# Patient Record
Sex: FEMALE | Race: WHITE | Employment: OTHER | URBAN - METROPOLITAN AREA
[De-identification: names, ages, dates, MRNs, and addresses within clinical notes are randomized per-mention and may not be internally consistent; named-entity substitution may affect disease eponyms.]

---

## 2017-02-23 PROBLEM — R80.9 PROTEINURIA: Status: ACTIVE | Noted: 2017-02-23

## 2017-02-23 PROBLEM — I73.9 PAD (PERIPHERAL ARTERY DISEASE) (HCC): Status: ACTIVE | Noted: 2017-02-23

## 2017-02-23 PROBLEM — E78.5 HYPERLIPIDEMIA: Status: ACTIVE | Noted: 2017-02-23

## 2017-02-23 PROBLEM — N32.81 OAB (OVERACTIVE BLADDER): Status: ACTIVE | Noted: 2017-02-23

## 2017-02-23 PROBLEM — I10 HTN (HYPERTENSION): Status: ACTIVE | Noted: 2017-02-23

## 2017-03-15 ENCOUNTER — HOSPITAL ENCOUNTER (OUTPATIENT)
Dept: GENERAL RADIOLOGY | Age: 82
Discharge: HOME | End: 2017-03-15
Attending: INTERNAL MEDICINE | Admitting: INTERNAL MEDICINE

## 2017-03-15 LAB
ALBUMIN SERPL-MCNC: 4.8 GM/DL (ref 3.4–5)
ANION GAP SERPL CALCULATED.3IONS-SCNC: 17 MMOL/L (ref 4–16)
BACTERIA: NEGATIVE /HPF
BILIRUBIN URINE: NEGATIVE MG/DL
BLOOD, URINE: NEGATIVE
BUN BLDV-MCNC: 70 MG/DL (ref 6–23)
CALCIUM SERPL-MCNC: 9.5 MG/DL (ref 8.3–10.6)
CHLORIDE BLD-SCNC: 92 MMOL/L (ref 99–110)
CLARITY: CLEAR
CO2: 23 MMOL/L (ref 21–32)
COLOR: COLORLESS
CREAT SERPL-MCNC: 1.7 MG/DL (ref 0.6–1.1)
GFR AFRICAN AMERICAN: 35 ML/MIN/1.73M2
GFR NON-AFRICAN AMERICAN: 29 ML/MIN/1.73M2
GLUCOSE BLD-MCNC: 98 MG/DL (ref 70–140)
GLUCOSE, URINE: NEGATIVE MG/DL
HYALINE CASTS: 2 /LPF
KETONES, URINE: NEGATIVE MG/DL
LEUKOCYTE ESTERASE, URINE: NEGATIVE
MAGNESIUM: 2 MG/DL (ref 1.8–2.4)
MUCUS: ABNORMAL HPF
NITRITE URINE, QUANTITATIVE: NEGATIVE
PH, URINE: 5 (ref 5–8)
POTASSIUM SERPL-SCNC: 4.2 MMOL/L (ref 3.5–5.1)
PROTEIN UA: NEGATIVE MG/DL
RBC URINE: ABNORMAL /HPF (ref 0–6)
SODIUM BLD-SCNC: 132 MMOL/L (ref 135–145)
SPECIFIC GRAVITY UA: 1 (ref 1–1.03)
SQUAMOUS EPITHELIAL: <1 /HPF
UROBILINOGEN, URINE: NORMAL EU/DL (ref 0.2–1)
WBC UA: <1 /HPF (ref 0–5)

## 2017-03-17 LAB
ALBUMIN ELP: 3.8 GM/DL (ref 3.2–5.6)
ALBUMIN, U: 100 %
ALPHA-1-GLOBULIN, U: 0 %
ALPHA-1-GLOBULIN: 0.3 GM/DL (ref 0.1–0.4)
ALPHA-2-GLOBULIN, U: 0 %
ALPHA-2-GLOBULIN: 1.1 GM/DL (ref 0.4–1.2)
BETA GLOBULIN, U: 0 %
BETA GLOBULIN: 1.3 GM/DL (ref 0.5–1.3)
COMPLEMENT C3: 156 MG/DL
COMPLEMENT C4: 46 MG/DL
GAMMA GLOBULIN, U: 0 %
GAMMA GLOBULIN: 0.8 GM/DL (ref 0.5–1.6)
INTERPRETATION: NORMAL
KAPPA QUANT FREE LIGHT CHAINS: 2.97
KAPPA/LAMBDA FREE LIGHT CHAIN RATIO: 1.81
LAMBDA FREE LIGHT CHAINS URINE/ VOL: 1.64
NEUTROPHIL CYTOPLASMIC AB IGG: NORMAL
TOTAL PROTEIN: 7.2 GM/DL (ref 6.4–8.2)
URINE TOTAL PROTEIN: 4.6 MG/DL

## 2017-03-19 ENCOUNTER — HOSPITAL ENCOUNTER (OUTPATIENT)
Dept: OTHER | Age: 82
Discharge: HOME | End: 2017-03-19
Attending: INTERNAL MEDICINE | Admitting: INTERNAL MEDICINE

## 2017-03-20 ENCOUNTER — HOSPITAL ENCOUNTER (OUTPATIENT)
Dept: GENERAL RADIOLOGY | Age: 82
Discharge: HOME | End: 2017-03-20
Attending: INTERNAL MEDICINE | Admitting: INTERNAL MEDICINE

## 2017-03-20 LAB
ALBUMIN SERPL-MCNC: 4.5 GM/DL (ref 3.4–5)
ANION GAP SERPL CALCULATED.3IONS-SCNC: 15 MMOL/L (ref 4–16)
BACTERIA: ABNORMAL /HPF
BILIRUBIN URINE: NEGATIVE MG/DL
BLOOD, URINE: NEGATIVE
BUN BLDV-MCNC: 23 MG/DL (ref 6–23)
CALCIUM SERPL-MCNC: 10.1 MG/DL (ref 8.3–10.6)
CHLORIDE BLD-SCNC: 103 MMOL/L (ref 99–110)
CLARITY: ABNORMAL
CO2: 23 MMOL/L (ref 21–32)
COLOR: ABNORMAL
CREAT SERPL-MCNC: 1 MG/DL (ref 0.6–1.1)
GFR AFRICAN AMERICAN: >60 ML/MIN/1.73M2
GFR NON-AFRICAN AMERICAN: 53 ML/MIN/1.73M2
GLUCOSE BLD-MCNC: 91 MG/DL (ref 70–140)
GLUCOSE, URINE: NEGATIVE MG/DL
HCT VFR BLD CALC: 33.9 % (ref 37–47)
HEMOGLOBIN: 11.3 GM/DL (ref 12.5–16)
KETONES, URINE: ABNORMAL MG/DL
LEUKOCYTE ESTERASE, URINE: NEGATIVE
Lab: 24 HRS
MCH RBC QN AUTO: 31.6 PG (ref 27–31)
MCHC RBC AUTO-ENTMCNC: 33.3 % (ref 32–36)
MCV RBC AUTO: 94.7 FL (ref 78–100)
MUCUS: ABNORMAL HPF
NITRITE URINE, QUANTITATIVE: NEGATIVE
PDW BLD-RTO: 13.4 % (ref 11.7–14.9)
PH, URINE: 5 (ref 5–8)
PHOSPHORUS: 3 MG/DL (ref 2.5–4.9)
PLATELET # BLD: 330 K/CU MM (ref 140–440)
PMV BLD AUTO: 10.7 FL (ref 7.5–11.1)
POTASSIUM SERPL-SCNC: 4.2 MMOL/L (ref 3.5–5.1)
PROTEIN 24 HOUR URINE: 942 MG/24 HR
PROTEIN UA: 30 MG/DL
RBC # BLD: 3.58 M/CU MM (ref 4.2–5.4)
RBC URINE: <1 /HPF (ref 0–6)
SODIUM BLD-SCNC: 141 MMOL/L (ref 135–145)
SPECIFIC GRAVITY UA: 1.01 (ref 1–1.03)
SQUAMOUS EPITHELIAL: 2 /HPF
UROBILINOGEN, URINE: NORMAL EU/DL (ref 0.2–1)
VOLUME, (UVOL): 1750 MLS
WBC # BLD: 8.8 K/CU MM (ref 4–10.5)
WBC UA: <1 /HPF (ref 0–5)

## 2017-03-22 LAB
Lab: NORMAL
TEST NAME: NORMAL

## 2017-04-17 PROBLEM — N17.9 ACUTE RENAL FAILURE (HCC): Status: ACTIVE | Noted: 2017-04-17

## 2017-04-17 PROBLEM — N18.2 CHRONIC KIDNEY DISEASE (CKD) STAGE G2/A3, MILDLY DECREASED GLOMERULAR FILTRATION RATE (GFR) BETWEEN 60-89 ML/MIN/1.73 SQUARE METER AND ALBUMINURIA CREATININE RATIO GREATER THAN 300 MG/G: Status: ACTIVE | Noted: 2017-04-17

## 2017-04-24 ENCOUNTER — HOSPITAL ENCOUNTER (OUTPATIENT)
Dept: GENERAL RADIOLOGY | Age: 82
Discharge: HOME | End: 2017-04-24
Attending: INTERNAL MEDICINE | Admitting: INTERNAL MEDICINE

## 2017-04-24 LAB
BACTERIA: ABNORMAL /HPF
BILIRUBIN URINE: NEGATIVE MG/DL
BLOOD, URINE: NEGATIVE
CLARITY: CLEAR
COLOR: COLORLESS
CREATININE URINE: 23.9 MG/DL (ref 28–217)
GLUCOSE, URINE: NEGATIVE MG/DL
KETONES, URINE: NEGATIVE MG/DL
LEUKOCYTE ESTERASE, URINE: NEGATIVE
NITRITE URINE, QUANTITATIVE: NEGATIVE
PH, URINE: 7 (ref 5–8)
PROT/CREAT RATIO, UR: 0.3
PROTEIN UA: NEGATIVE MG/DL
RBC URINE: ABNORMAL /HPF (ref 0–6)
SPECIFIC GRAVITY UA: 1 (ref 1–1.03)
SQUAMOUS EPITHELIAL: <1 /HPF
URINE TOTAL PROTEIN: 7.7 MG/DL
UROBILINOGEN, URINE: NORMAL EU/DL (ref 0.2–1)
WBC UA: 1 /HPF (ref 0–5)

## 2017-06-06 ENCOUNTER — HOSPITAL ENCOUNTER (OUTPATIENT)
Dept: ULTRASOUND IMAGING | Age: 82
Discharge: HOME | End: 2017-06-06
Attending: FAMILY MEDICINE | Admitting: FAMILY MEDICINE

## 2017-06-06 DIAGNOSIS — N63.20 LEFT BREAST LUMP: ICD-10-CM

## 2017-06-06 DIAGNOSIS — N63.0 BREAST LUMP: ICD-10-CM

## 2017-07-18 ENCOUNTER — HOSPITAL ENCOUNTER (OUTPATIENT)
Dept: OTHER | Age: 82
Discharge: HOME | End: 2017-07-18
Attending: INTERNAL MEDICINE | Admitting: INTERNAL MEDICINE

## 2017-07-19 ENCOUNTER — HOSPITAL ENCOUNTER (OUTPATIENT)
Dept: GENERAL RADIOLOGY | Age: 82
Discharge: HOME | End: 2017-07-19
Attending: INTERNAL MEDICINE | Admitting: INTERNAL MEDICINE

## 2017-07-19 LAB
ALBUMIN SERPL-MCNC: 4.3 GM/DL (ref 3.4–5)
ANION GAP SERPL CALCULATED.3IONS-SCNC: 15 MMOL/L (ref 4–16)
BUN BLDV-MCNC: 20 MG/DL (ref 6–23)
CALCIUM SERPL-MCNC: 9.7 MG/DL (ref 8.3–10.6)
CHLORIDE BLD-SCNC: 99 MMOL/L (ref 99–110)
CO2: 26 MMOL/L (ref 21–32)
CREAT SERPL-MCNC: 1 MG/DL (ref 0.6–1.1)
GFR AFRICAN AMERICAN: >60 ML/MIN/1.73M2
GFR NON-AFRICAN AMERICAN: 53 ML/MIN/1.73M2
GLUCOSE BLD-MCNC: 91 MG/DL (ref 70–140)
Lab: 24 HRS
MAGNESIUM: 2 MG/DL (ref 1.8–2.4)
POTASSIUM SERPL-SCNC: 4.5 MMOL/L (ref 3.5–5.1)
PROTEIN 24 HOUR URINE: 364 MG/24 HR
SODIUM BLD-SCNC: 140 MMOL/L (ref 135–145)
VOLUME, (UVOL): 2600 MLS

## 2017-12-27 ENCOUNTER — HOSPITAL ENCOUNTER (OUTPATIENT)
Dept: ULTRASOUND IMAGING | Age: 82
Discharge: HOME | End: 2017-12-27
Attending: FAMILY MEDICINE | Admitting: FAMILY MEDICINE

## 2017-12-27 DIAGNOSIS — N60.02 CYST OF LEFT BREAST: ICD-10-CM

## 2018-02-05 ENCOUNTER — HOSPITAL ENCOUNTER (OUTPATIENT)
Dept: GENERAL RADIOLOGY | Age: 83
Discharge: HOME | End: 2018-02-05
Attending: INTERNAL MEDICINE | Admitting: INTERNAL MEDICINE

## 2018-02-05 LAB
ANION GAP SERPL CALCULATED.3IONS-SCNC: 17 MMOL/L (ref 4–16)
BACTERIA: ABNORMAL /HPF
BILIRUBIN URINE: NEGATIVE MG/DL
BLOOD, URINE: NEGATIVE
BUN BLDV-MCNC: 15 MG/DL (ref 6–23)
CALCIUM SERPL-MCNC: 9.7 MG/DL (ref 8.3–10.6)
CHLORIDE BLD-SCNC: 99 MMOL/L (ref 99–110)
CLARITY: ABNORMAL
CO2: 28 MMOL/L (ref 21–32)
COLOR: ABNORMAL
CREAT SERPL-MCNC: 0.9 MG/DL (ref 0.6–1.1)
CREATININE URINE: 27.9 MG/DL (ref 28–217)
GFR AFRICAN AMERICAN: >60 ML/MIN/1.73M2
GFR NON-AFRICAN AMERICAN: >60 ML/MIN/1.73M2
GLUCOSE BLD-MCNC: 98 MG/DL (ref 70–99)
GLUCOSE, URINE: NEGATIVE MG/DL
KETONES, URINE: NEGATIVE MG/DL
LEUKOCYTE ESTERASE, URINE: NEGATIVE
NITRITE URINE, QUANTITATIVE: NEGATIVE
PH, URINE: 6 (ref 5–8)
POTASSIUM SERPL-SCNC: 3.8 MMOL/L (ref 3.5–5.1)
PROT/CREAT RATIO, UR: 2.4
PROTEIN UA: 100 MG/DL
RBC URINE: 1 /HPF (ref 0–6)
SODIUM BLD-SCNC: 144 MMOL/L (ref 135–145)
SPECIFIC GRAVITY UA: 1 (ref 1–1.03)
SQUAMOUS EPITHELIAL: 1 /HPF
TRANSITIONAL EPITHELIAL: <1 /HPF
TRICHOMONAS: ABNORMAL /HPF
URINE TOTAL PROTEIN: 66.4 MG/DL
UROBILINOGEN, URINE: NORMAL MG/DL (ref 0.2–1)
WBC UA: ABNORMAL /HPF (ref 0–5)

## 2018-03-01 PROBLEM — N18.1 CHRONIC KIDNEY DISEASE (CKD) STAGE G1/A3, GLOMERULAR FILTRATION RATE (GFR) EQUAL TO OR GREATER THAN 90 ML/MIN/1.73 SQUARE METER AND ALBUMINURIA CREATININE RATIO GREATER THAN 300 MG/G: Status: ACTIVE | Noted: 2018-03-01

## 2018-05-08 ENCOUNTER — HOSPITAL ENCOUNTER (OUTPATIENT)
Dept: GENERAL RADIOLOGY | Age: 83
Discharge: HOME | End: 2018-05-08
Attending: FAMILY MEDICINE | Admitting: FAMILY MEDICINE

## 2018-05-08 LAB
ALBUMIN SERPL-MCNC: 4.3 GM/DL (ref 3.4–5)
ALP BLD-CCNC: 73 IU/L (ref 40–129)
ALT SERPL-CCNC: 7 U/L (ref 10–40)
AST SERPL-CCNC: 16 IU/L (ref 15–37)
BASOPHILS ABSOLUTE: 0.1 K/CU MM
BASOPHILS RELATIVE PERCENT: 0.7 % (ref 0–1)
BILIRUB SERPL-MCNC: 0.3 MG/DL (ref 0–1)
BILIRUBIN DIRECT: 0.2 MG/DL (ref 0–0.3)
BILIRUBIN, INDIRECT: 0.1 MG/DL (ref 0–0.7)
CHOLESTEROL: 232 MG/DL
DIFFERENTIAL TYPE: ABNORMAL
EOSINOPHILS ABSOLUTE: 0.4 K/CU MM
EOSINOPHILS RELATIVE PERCENT: 4.2 % (ref 0–3)
HCT VFR BLD CALC: 35.9 % (ref 37–47)
HDLC SERPL-MCNC: 62 MG/DL
HEMOGLOBIN: 11.8 GM/DL (ref 12.5–16)
IMMATURE NEUTROPHIL %: 0.3 % (ref 0–0.43)
IRON: 82 UG/DL (ref 37–145)
LDL CHOLESTEROL DIRECT: 170 MG/DL
LYMPHOCYTES ABSOLUTE: 2.2 K/CU MM
LYMPHOCYTES RELATIVE PERCENT: 23.1 % (ref 24–44)
MCH RBC QN AUTO: 31.8 PG (ref 27–31)
MCHC RBC AUTO-ENTMCNC: 32.9 % (ref 32–36)
MCV RBC AUTO: 96.8 FL (ref 78–100)
MONOCYTES ABSOLUTE: 0.8 K/CU MM
MONOCYTES RELATIVE PERCENT: 8.2 % (ref 0–4)
NUCLEATED RBC %: 0 %
PDW BLD-RTO: 13.2 % (ref 11.7–14.9)
PLATELET # BLD: 370 K/CU MM (ref 140–440)
PMV BLD AUTO: 10.7 FL (ref 7.5–11.1)
RBC # BLD: 3.71 M/CU MM (ref 4.2–5.4)
SEGMENTED NEUTROPHILS ABSOLUTE COUNT: 6.1 K/CU MM
SEGMENTED NEUTROPHILS RELATIVE PERCENT: 63.5 % (ref 36–66)
T4 FREE: 1.24 NG/DL (ref 0.9–1.8)
TOTAL IMMATURE NEUTOROPHIL: 0.03 K/CU MM
TOTAL NUCLEATED RBC: 0 K/CU MM
TOTAL PROTEIN: 6.7 GM/DL (ref 6.4–8.2)
TRIGL SERPL-MCNC: 160 MG/DL
TSH HIGH SENSITIVITY: 6.31 UIU/ML (ref 0.27–4.2)
VITAMIN B-12: 485.7 PG/ML (ref 211–911)
WBC # BLD: 9.5 K/CU MM (ref 4–10.5)

## 2018-06-22 ENCOUNTER — HOSPITAL ENCOUNTER (OUTPATIENT)
Dept: WOMENS IMAGING | Age: 83
Discharge: HOME | End: 2018-06-22
Attending: FAMILY MEDICINE | Admitting: FAMILY MEDICINE

## 2018-06-22 DIAGNOSIS — Z12.31 ENCOUNTER FOR SCREENING MAMMOGRAM FOR BREAST CANCER: ICD-10-CM

## 2018-06-28 ENCOUNTER — HOSPITAL ENCOUNTER (OUTPATIENT)
Dept: WOMENS IMAGING | Age: 83
Discharge: HOME | End: 2018-06-28
Attending: FAMILY MEDICINE | Admitting: FAMILY MEDICINE

## 2018-06-28 DIAGNOSIS — N64.89 BREAST ASYMMETRY: ICD-10-CM

## 2018-10-24 ENCOUNTER — HOSPITAL ENCOUNTER (OUTPATIENT)
Age: 83
Discharge: HOME | End: 2018-10-24
Payer: MEDICARE

## 2018-10-24 LAB
BASOPHILS ABSOLUTE: 0.1 K/CU MM
BASOPHILS RELATIVE PERCENT: 1.1 % (ref 0–1)
DIFFERENTIAL TYPE: ABNORMAL
EOSINOPHILS ABSOLUTE: 0.4 K/CU MM
EOSINOPHILS RELATIVE PERCENT: 5.3 % (ref 0–3)
HCT VFR BLD CALC: 36.3 % (ref 37–47)
HEMOGLOBIN: 11.5 GM/DL (ref 12.5–16)
IMMATURE NEUTROPHIL %: 0.4 % (ref 0–0.43)
LYMPHOCYTES ABSOLUTE: 2.6 K/CU MM
LYMPHOCYTES RELATIVE PERCENT: 32.5 % (ref 24–44)
MCH RBC QN AUTO: 30.8 PG (ref 27–31)
MCHC RBC AUTO-ENTMCNC: 31.7 % (ref 32–36)
MCV RBC AUTO: 97.3 FL (ref 78–100)
MONOCYTES ABSOLUTE: 0.8 K/CU MM
MONOCYTES RELATIVE PERCENT: 9.8 % (ref 0–4)
NUCLEATED RBC %: 0 %
PDW BLD-RTO: 13.5 % (ref 11.7–14.9)
PLATELET # BLD: 405 K/CU MM (ref 140–440)
PMV BLD AUTO: 10.7 FL (ref 7.5–11.1)
RBC # BLD: 3.73 M/CU MM (ref 4.2–5.4)
SEGMENTED NEUTROPHILS ABSOLUTE COUNT: 4.1 K/CU MM
SEGMENTED NEUTROPHILS RELATIVE PERCENT: 50.9 % (ref 36–66)
T3 FREE: 2.4 PG/ML (ref 2.3–4.2)
T4 FREE: 1.18 NG/DL (ref 0.9–1.8)
TOTAL IMMATURE NEUTOROPHIL: 0.03 K/CU MM
TOTAL NUCLEATED RBC: 0 K/CU MM
TSH HIGH SENSITIVITY: 4.63 UIU/ML (ref 0.27–4.2)
WBC # BLD: 8.1 K/CU MM (ref 4–10.5)

## 2018-10-24 PROCEDURE — 85025 COMPLETE CBC W/AUTO DIFF WBC: CPT

## 2018-10-24 PROCEDURE — 84443 ASSAY THYROID STIM HORMONE: CPT

## 2018-10-24 PROCEDURE — 36415 COLL VENOUS BLD VENIPUNCTURE: CPT

## 2018-10-24 PROCEDURE — 84481 FREE ASSAY (FT-3): CPT

## 2018-10-24 PROCEDURE — 84439 ASSAY OF FREE THYROXINE: CPT

## 2018-11-02 RX ORDER — SODIUM CHLORIDE 0.9 % (FLUSH) 0.9 %
10 SYRINGE (ML) INJECTION 2 TIMES DAILY
Status: CANCELLED | OUTPATIENT
Start: 2018-11-02

## 2018-11-04 ENCOUNTER — APPOINTMENT (OUTPATIENT)
Dept: GENERAL RADIOLOGY | Age: 83
DRG: 291 | End: 2018-11-04
Payer: MEDICARE

## 2018-11-04 ENCOUNTER — APPOINTMENT (OUTPATIENT)
Dept: CT IMAGING | Age: 83
DRG: 291 | End: 2018-11-04
Payer: MEDICARE

## 2018-11-04 ENCOUNTER — HOSPITAL ENCOUNTER (INPATIENT)
Age: 83
LOS: 3 days | Discharge: HOME | DRG: 291 | End: 2018-11-08
Attending: EMERGENCY MEDICINE | Admitting: INTERNAL MEDICINE
Payer: MEDICARE

## 2018-11-04 DIAGNOSIS — N18.1 CHRONIC KIDNEY DISEASE (CKD) STAGE G1/A3, GLOMERULAR FILTRATION RATE (GFR) EQUAL TO OR GREATER THAN 90 ML/MIN/1.73 SQUARE METER AND ALBUMINURIA CREATININE RATIO GREATER THAN 300 MG/G: ICD-10-CM

## 2018-11-04 DIAGNOSIS — J96.01 ACUTE RESPIRATORY FAILURE WITH HYPOXIA (HCC): ICD-10-CM

## 2018-11-04 DIAGNOSIS — R68.89 GENERAL ILL FEELING: ICD-10-CM

## 2018-11-04 DIAGNOSIS — D72.829 LEUKOCYTOSIS, UNSPECIFIED TYPE: ICD-10-CM

## 2018-11-04 DIAGNOSIS — R51.9 NONINTRACTABLE HEADACHE, UNSPECIFIED CHRONICITY PATTERN, UNSPECIFIED HEADACHE TYPE: ICD-10-CM

## 2018-11-04 DIAGNOSIS — R11.0 NAUSEA: Primary | ICD-10-CM

## 2018-11-04 DIAGNOSIS — R79.89 ELEVATED BRAIN NATRIURETIC PEPTIDE (BNP) LEVEL: ICD-10-CM

## 2018-11-04 DIAGNOSIS — R42 DIZZINESS: ICD-10-CM

## 2018-11-04 LAB
ALBUMIN SERPL-MCNC: 4.2 GM/DL (ref 3.4–5)
ALP BLD-CCNC: 82 IU/L (ref 40–128)
ALT SERPL-CCNC: 9 U/L (ref 10–40)
ANION GAP SERPL CALCULATED.3IONS-SCNC: 14 MMOL/L (ref 4–16)
APTT: 33.1 SECONDS (ref 21.2–33)
AST SERPL-CCNC: 17 IU/L (ref 15–37)
BASOPHILS ABSOLUTE: 0.1 K/CU MM
BASOPHILS RELATIVE PERCENT: 0.5 % (ref 0–1)
BILIRUB SERPL-MCNC: 0.3 MG/DL (ref 0–1)
BUN BLDV-MCNC: 23 MG/DL (ref 6–23)
CALCIUM SERPL-MCNC: 9 MG/DL (ref 8.3–10.6)
CHLORIDE BLD-SCNC: 98 MMOL/L (ref 99–110)
CO2: 24 MMOL/L (ref 21–32)
CREAT SERPL-MCNC: 1.2 MG/DL (ref 0.6–1.1)
DIFFERENTIAL TYPE: ABNORMAL
EOSINOPHILS ABSOLUTE: 0.2 K/CU MM
EOSINOPHILS RELATIVE PERCENT: 1.6 % (ref 0–3)
GFR AFRICAN AMERICAN: 52 ML/MIN/1.73M2
GFR NON-AFRICAN AMERICAN: 43 ML/MIN/1.73M2
GLUCOSE BLD-MCNC: 150 MG/DL (ref 70–99)
HCT VFR BLD CALC: 35.2 % (ref 37–47)
HEMOGLOBIN: 11.2 GM/DL (ref 12.5–16)
IMMATURE NEUTROPHIL %: 0.5 % (ref 0–0.43)
INR BLD: 1.11 INDEX
LIPASE: 17 IU/L (ref 13–60)
LYMPHOCYTES ABSOLUTE: 1.1 K/CU MM
LYMPHOCYTES RELATIVE PERCENT: 8.8 % (ref 24–44)
MCH RBC QN AUTO: 30.4 PG (ref 27–31)
MCHC RBC AUTO-ENTMCNC: 31.8 % (ref 32–36)
MCV RBC AUTO: 95.7 FL (ref 78–100)
MONOCYTES ABSOLUTE: 0.8 K/CU MM
MONOCYTES RELATIVE PERCENT: 5.9 % (ref 0–4)
NUCLEATED RBC %: 0 %
PDW BLD-RTO: 13.4 % (ref 11.7–14.9)
PLATELET # BLD: 318 K/CU MM (ref 140–440)
PMV BLD AUTO: 10.1 FL (ref 7.5–11.1)
POTASSIUM SERPL-SCNC: 3.7 MMOL/L (ref 3.5–5.1)
PRO-BNP: 5765 PG/ML
PROTHROMBIN TIME: 12.6 SECONDS (ref 9.12–12.5)
RBC # BLD: 3.68 M/CU MM (ref 4.2–5.4)
SEGMENTED NEUTROPHILS ABSOLUTE COUNT: 10.7 K/CU MM
SEGMENTED NEUTROPHILS RELATIVE PERCENT: 82.7 % (ref 36–66)
SODIUM BLD-SCNC: 136 MMOL/L (ref 135–145)
TOTAL CK: 44 IU/L (ref 26–140)
TOTAL IMMATURE NEUTOROPHIL: 0.06 K/CU MM
TOTAL NUCLEATED RBC: 0 K/CU MM
TOTAL PROTEIN: 7.1 GM/DL (ref 6.4–8.2)
TROPONIN T: <0.01 NG/ML
WBC # BLD: 12.9 K/CU MM (ref 4–10.5)

## 2018-11-04 PROCEDURE — S0028 INJECTION, FAMOTIDINE, 20 MG: HCPCS | Performed by: EMERGENCY MEDICINE

## 2018-11-04 PROCEDURE — 94761 N-INVAS EAR/PLS OXIMETRY MLT: CPT

## 2018-11-04 PROCEDURE — 2580000003 HC RX 258: Performed by: EMERGENCY MEDICINE

## 2018-11-04 PROCEDURE — 85610 PROTHROMBIN TIME: CPT

## 2018-11-04 PROCEDURE — 71045 X-RAY EXAM CHEST 1 VIEW: CPT

## 2018-11-04 PROCEDURE — 2500000003 HC RX 250 WO HCPCS: Performed by: EMERGENCY MEDICINE

## 2018-11-04 PROCEDURE — 85025 COMPLETE CBC W/AUTO DIFF WBC: CPT

## 2018-11-04 PROCEDURE — 93005 ELECTROCARDIOGRAM TRACING: CPT | Performed by: EMERGENCY MEDICINE

## 2018-11-04 PROCEDURE — 96374 THER/PROPH/DIAG INJ IV PUSH: CPT

## 2018-11-04 PROCEDURE — 6360000004 HC RX CONTRAST MEDICATION: Performed by: EMERGENCY MEDICINE

## 2018-11-04 PROCEDURE — 96361 HYDRATE IV INFUSION ADD-ON: CPT

## 2018-11-04 PROCEDURE — 70496 CT ANGIOGRAPHY HEAD: CPT

## 2018-11-04 PROCEDURE — 99285 EMERGENCY DEPT VISIT HI MDM: CPT

## 2018-11-04 PROCEDURE — 6370000000 HC RX 637 (ALT 250 FOR IP): Performed by: EMERGENCY MEDICINE

## 2018-11-04 PROCEDURE — 80053 COMPREHEN METABOLIC PANEL: CPT

## 2018-11-04 PROCEDURE — 84484 ASSAY OF TROPONIN QUANT: CPT

## 2018-11-04 PROCEDURE — 83880 ASSAY OF NATRIURETIC PEPTIDE: CPT

## 2018-11-04 PROCEDURE — 85730 THROMBOPLASTIN TIME PARTIAL: CPT

## 2018-11-04 PROCEDURE — 82550 ASSAY OF CK (CPK): CPT

## 2018-11-04 PROCEDURE — 83690 ASSAY OF LIPASE: CPT

## 2018-11-04 PROCEDURE — 2700000000 HC OXYGEN THERAPY PER DAY

## 2018-11-04 PROCEDURE — 6360000002 HC RX W HCPCS: Performed by: EMERGENCY MEDICINE

## 2018-11-04 PROCEDURE — 70498 CT ANGIOGRAPHY NECK: CPT

## 2018-11-04 RX ORDER — ACETAMINOPHEN 500 MG
1000 TABLET ORAL ONCE
Status: COMPLETED | OUTPATIENT
Start: 2018-11-04 | End: 2018-11-04

## 2018-11-04 RX ORDER — SODIUM CHLORIDE 0.9 % (FLUSH) 0.9 %
10 SYRINGE (ML) INJECTION 2 TIMES DAILY
Status: DISCONTINUED | OUTPATIENT
Start: 2018-11-04 | End: 2018-11-08 | Stop reason: HOSPADM

## 2018-11-04 RX ORDER — METOCLOPRAMIDE HYDROCHLORIDE 5 MG/ML
10 INJECTION INTRAMUSCULAR; INTRAVENOUS ONCE
Status: COMPLETED | OUTPATIENT
Start: 2018-11-04 | End: 2018-11-04

## 2018-11-04 RX ORDER — DIPHENHYDRAMINE HCL 25 MG
25 TABLET ORAL ONCE
Status: COMPLETED | OUTPATIENT
Start: 2018-11-04 | End: 2018-11-04

## 2018-11-04 RX ORDER — 0.9 % SODIUM CHLORIDE 0.9 %
1000 INTRAVENOUS SOLUTION INTRAVENOUS ONCE
Status: COMPLETED | OUTPATIENT
Start: 2018-11-04 | End: 2018-11-05

## 2018-11-04 RX ADMIN — DIPHENHYDRAMINE HCL 25 MG: 25 TABLET ORAL at 22:19

## 2018-11-04 RX ADMIN — FAMOTIDINE 20 MG: 10 INJECTION, SOLUTION INTRAVENOUS at 22:20

## 2018-11-04 RX ADMIN — METOCLOPRAMIDE 10 MG: 5 INJECTION, SOLUTION INTRAMUSCULAR; INTRAVENOUS at 22:19

## 2018-11-04 RX ADMIN — SODIUM CHLORIDE, PRESERVATIVE FREE 10 ML: 5 INJECTION INTRAVENOUS at 23:34

## 2018-11-04 RX ADMIN — SODIUM CHLORIDE 1000 ML: 9 INJECTION, SOLUTION INTRAVENOUS at 22:19

## 2018-11-04 RX ADMIN — ACETAMINOPHEN 1000 MG: 500 TABLET, FILM COATED ORAL at 22:19

## 2018-11-04 RX ADMIN — IOPAMIDOL 80 ML: 755 INJECTION, SOLUTION INTRAVENOUS at 23:34

## 2018-11-04 ASSESSMENT — ENCOUNTER SYMPTOMS
PHOTOPHOBIA: 1
ALLERGIC/IMMUNOLOGIC NEGATIVE: 1
VOMITING: 1
NAUSEA: 1
RESPIRATORY NEGATIVE: 1
DIARRHEA: 1

## 2018-11-04 ASSESSMENT — PAIN DESCRIPTION - PAIN TYPE: TYPE: ACUTE PAIN

## 2018-11-04 ASSESSMENT — PAIN SCALES - GENERAL
PAINLEVEL_OUTOF10: 5
PAINLEVEL_OUTOF10: 10

## 2018-11-04 ASSESSMENT — PAIN DESCRIPTION - LOCATION: LOCATION: HEAD

## 2018-11-05 ENCOUNTER — HOSPITAL ENCOUNTER (OUTPATIENT)
Dept: CT IMAGING | Age: 83
Discharge: HOME | End: 2018-11-05
Payer: MEDICARE

## 2018-11-05 ENCOUNTER — APPOINTMENT (OUTPATIENT)
Dept: GENERAL RADIOLOGY | Age: 83
DRG: 291 | End: 2018-11-05
Payer: MEDICARE

## 2018-11-05 ENCOUNTER — APPOINTMENT (OUTPATIENT)
Dept: ULTRASOUND IMAGING | Age: 83
DRG: 291 | End: 2018-11-05
Payer: MEDICARE

## 2018-11-05 PROBLEM — J81.0 FLASH PULMONARY EDEMA (HCC): Status: ACTIVE | Noted: 2018-11-05

## 2018-11-05 LAB
MAGNESIUM: 1.9 MG/DL (ref 1.8–2.4)
T4 FREE: 1.15 NG/DL (ref 0.9–1.8)
TROPONIN T: 0.03 NG/ML
TSH HIGH SENSITIVITY: 5.56 UIU/ML (ref 0.27–4.2)

## 2018-11-05 PROCEDURE — 2500000003 HC RX 250 WO HCPCS

## 2018-11-05 PROCEDURE — 2140000000 HC CCU INTERMEDIATE R&B

## 2018-11-05 PROCEDURE — C9113 INJ PANTOPRAZOLE SODIUM, VIA: HCPCS | Performed by: INTERNAL MEDICINE

## 2018-11-05 PROCEDURE — 6360000002 HC RX W HCPCS: Performed by: EMERGENCY MEDICINE

## 2018-11-05 PROCEDURE — 93010 ELECTROCARDIOGRAM REPORT: CPT | Performed by: INTERNAL MEDICINE

## 2018-11-05 PROCEDURE — 36415 COLL VENOUS BLD VENIPUNCTURE: CPT

## 2018-11-05 PROCEDURE — 83735 ASSAY OF MAGNESIUM: CPT

## 2018-11-05 PROCEDURE — 93925 LOWER EXTREMITY STUDY: CPT

## 2018-11-05 PROCEDURE — 50200 RENAL BIOPSY PERQ: CPT

## 2018-11-05 PROCEDURE — 93306 TTE W/DOPPLER COMPLETE: CPT

## 2018-11-05 PROCEDURE — 2700000000 HC OXYGEN THERAPY PER DAY

## 2018-11-05 PROCEDURE — 6370000000 HC RX 637 (ALT 250 FOR IP): Performed by: INTERNAL MEDICINE

## 2018-11-05 PROCEDURE — 2580000003 HC RX 258: Performed by: INTERNAL MEDICINE

## 2018-11-05 PROCEDURE — 88333 PATH CONSLTJ SURG CYTO XM 1: CPT

## 2018-11-05 PROCEDURE — 94640 AIRWAY INHALATION TREATMENT: CPT

## 2018-11-05 PROCEDURE — 94761 N-INVAS EAR/PLS OXIMETRY MLT: CPT

## 2018-11-05 PROCEDURE — 2709999900 HC NON-CHARGEABLE SUPPLY

## 2018-11-05 PROCEDURE — 88334 PATH CONSLTJ SURG CYTO XM EA: CPT

## 2018-11-05 PROCEDURE — 94660 CPAP INITIATION&MGMT: CPT

## 2018-11-05 PROCEDURE — 84439 ASSAY OF FREE THYROXINE: CPT

## 2018-11-05 PROCEDURE — 84443 ASSAY THYROID STIM HORMONE: CPT

## 2018-11-05 PROCEDURE — 2580000003 HC RX 258: Performed by: EMERGENCY MEDICINE

## 2018-11-05 PROCEDURE — 94664 DEMO&/EVAL PT USE INHALER: CPT

## 2018-11-05 PROCEDURE — 6370000000 HC RX 637 (ALT 250 FOR IP): Performed by: EMERGENCY MEDICINE

## 2018-11-05 PROCEDURE — 0TB03ZX EXCISION OF RIGHT KIDNEY, PERCUTANEOUS APPROACH, DIAGNOSTIC: ICD-10-PCS | Performed by: RADIOLOGY

## 2018-11-05 PROCEDURE — 96375 TX/PRO/DX INJ NEW DRUG ADDON: CPT

## 2018-11-05 PROCEDURE — 6360000002 HC RX W HCPCS: Performed by: INTERNAL MEDICINE

## 2018-11-05 PROCEDURE — 71045 X-RAY EXAM CHEST 1 VIEW: CPT

## 2018-11-05 PROCEDURE — 84484 ASSAY OF TROPONIN QUANT: CPT

## 2018-11-05 RX ORDER — SODIUM CHLORIDE, SODIUM LACTATE, POTASSIUM CHLORIDE, CALCIUM CHLORIDE 600; 310; 30; 20 MG/100ML; MG/100ML; MG/100ML; MG/100ML
INJECTION, SOLUTION INTRAVENOUS ONCE
Status: COMPLETED | OUTPATIENT
Start: 2018-11-05 | End: 2018-11-05

## 2018-11-05 RX ORDER — IPRATROPIUM BROMIDE AND ALBUTEROL SULFATE 2.5; .5 MG/3ML; MG/3ML
1 SOLUTION RESPIRATORY (INHALATION)
Status: DISCONTINUED | OUTPATIENT
Start: 2018-11-05 | End: 2018-11-08 | Stop reason: HOSPADM

## 2018-11-05 RX ORDER — CLOPIDOGREL BISULFATE 75 MG/1
75 TABLET ORAL DAILY
Status: DISCONTINUED | OUTPATIENT
Start: 2018-11-05 | End: 2018-11-05

## 2018-11-05 RX ORDER — MORPHINE SULFATE 2 MG/ML
2 INJECTION, SOLUTION INTRAMUSCULAR; INTRAVENOUS EVERY 4 HOURS PRN
Status: DISCONTINUED | OUTPATIENT
Start: 2018-11-05 | End: 2018-11-08 | Stop reason: HOSPADM

## 2018-11-05 RX ORDER — EPINEPHRINE 1 MG/ML
INJECTION, SOLUTION, CONCENTRATE INTRAVENOUS
Status: DISPENSED
Start: 2018-11-05 | End: 2018-11-05

## 2018-11-05 RX ORDER — SODIUM CHLORIDE 9 MG/ML
INJECTION, SOLUTION INTRAVENOUS CONTINUOUS
Status: DISCONTINUED | OUTPATIENT
Start: 2018-11-05 | End: 2018-11-06

## 2018-11-05 RX ORDER — PRAVASTATIN SODIUM 10 MG
10 TABLET ORAL DAILY
Status: DISCONTINUED | OUTPATIENT
Start: 2018-11-05 | End: 2018-11-08 | Stop reason: HOSPADM

## 2018-11-05 RX ORDER — METHYLPREDNISOLONE SODIUM SUCCINATE 125 MG/2ML
60 INJECTION, POWDER, LYOPHILIZED, FOR SOLUTION INTRAMUSCULAR; INTRAVENOUS ONCE
Status: COMPLETED | OUTPATIENT
Start: 2018-11-05 | End: 2018-11-05

## 2018-11-05 RX ORDER — SODIUM CHLORIDE 0.9 % (FLUSH) 0.9 %
10 SYRINGE (ML) INJECTION EVERY 12 HOURS SCHEDULED
Status: DISCONTINUED | OUTPATIENT
Start: 2018-11-05 | End: 2018-11-08 | Stop reason: HOSPADM

## 2018-11-05 RX ORDER — NITROGLYCERIN 20 MG/100ML
5 INJECTION INTRAVENOUS CONTINUOUS
Status: DISCONTINUED | OUTPATIENT
Start: 2018-11-05 | End: 2018-11-05 | Stop reason: SDUPTHER

## 2018-11-05 RX ORDER — M-VIT,TX,IRON,MINS/CALC/FOLIC 27MG-0.4MG
1 TABLET ORAL DAILY
Status: DISCONTINUED | OUTPATIENT
Start: 2018-11-05 | End: 2018-11-08 | Stop reason: HOSPADM

## 2018-11-05 RX ORDER — ONDANSETRON 2 MG/ML
4 INJECTION INTRAMUSCULAR; INTRAVENOUS EVERY 6 HOURS PRN
Status: DISCONTINUED | OUTPATIENT
Start: 2018-11-05 | End: 2018-11-08 | Stop reason: HOSPADM

## 2018-11-05 RX ORDER — IPRATROPIUM BROMIDE AND ALBUTEROL SULFATE 2.5; .5 MG/3ML; MG/3ML
1 SOLUTION RESPIRATORY (INHALATION) ONCE
Status: COMPLETED | OUTPATIENT
Start: 2018-11-05 | End: 2018-11-05

## 2018-11-05 RX ORDER — SODIUM CHLORIDE 0.9 % (FLUSH) 0.9 %
10 SYRINGE (ML) INJECTION PRN
Status: DISCONTINUED | OUTPATIENT
Start: 2018-11-05 | End: 2018-11-08 | Stop reason: HOSPADM

## 2018-11-05 RX ORDER — FUROSEMIDE 10 MG/ML
40 INJECTION INTRAMUSCULAR; INTRAVENOUS ONCE
Status: COMPLETED | OUTPATIENT
Start: 2018-11-05 | End: 2018-11-05

## 2018-11-05 RX ORDER — OXYBUTYNIN CHLORIDE 5 MG/1
5 TABLET, EXTENDED RELEASE ORAL NIGHTLY
Status: DISCONTINUED | OUTPATIENT
Start: 2018-11-05 | End: 2018-11-08 | Stop reason: HOSPADM

## 2018-11-05 RX ORDER — OYSTER SHELL CALCIUM WITH VITAMIN D 500; 200 MG/1; [IU]/1
1 TABLET, FILM COATED ORAL DAILY
Status: DISCONTINUED | OUTPATIENT
Start: 2018-11-05 | End: 2018-11-08 | Stop reason: HOSPADM

## 2018-11-05 RX ORDER — ACETAMINOPHEN 325 MG/1
650 TABLET ORAL EVERY 4 HOURS PRN
Status: DISCONTINUED | OUTPATIENT
Start: 2018-11-05 | End: 2018-11-06 | Stop reason: HOSPADM

## 2018-11-05 RX ORDER — CARVEDILOL 6.25 MG/1
6.25 TABLET ORAL 2 TIMES DAILY WITH MEALS
Status: DISCONTINUED | OUTPATIENT
Start: 2018-11-05 | End: 2018-11-08 | Stop reason: HOSPADM

## 2018-11-05 RX ORDER — CILOSTAZOL 100 MG/1
100 TABLET ORAL DAILY
Status: DISCONTINUED | OUTPATIENT
Start: 2018-11-05 | End: 2018-11-05

## 2018-11-05 RX ORDER — PANTOPRAZOLE SODIUM 40 MG/10ML
40 INJECTION, POWDER, LYOPHILIZED, FOR SOLUTION INTRAVENOUS DAILY
Status: DISCONTINUED | OUTPATIENT
Start: 2018-11-05 | End: 2018-11-08 | Stop reason: HOSPADM

## 2018-11-05 RX ORDER — ACETAMINOPHEN 325 MG/1
650 TABLET ORAL EVERY 8 HOURS SCHEDULED
Status: DISCONTINUED | OUTPATIENT
Start: 2018-11-05 | End: 2018-11-08 | Stop reason: HOSPADM

## 2018-11-05 RX ORDER — ASPIRIN 81 MG/1
81 TABLET, CHEWABLE ORAL DAILY
Status: DISCONTINUED | OUTPATIENT
Start: 2018-11-05 | End: 2018-11-08 | Stop reason: HOSPADM

## 2018-11-05 RX ORDER — NITROGLYCERIN 0.4 MG/1
0.4 TABLET SUBLINGUAL EVERY 5 MIN PRN
Status: DISCONTINUED | OUTPATIENT
Start: 2018-11-05 | End: 2018-11-08 | Stop reason: HOSPADM

## 2018-11-05 RX ORDER — NITROGLYCERIN 20 MG/100ML
5 INJECTION INTRAVENOUS CONTINUOUS
Status: DISCONTINUED | OUTPATIENT
Start: 2018-11-05 | End: 2018-11-05

## 2018-11-05 RX ORDER — LISINOPRIL 2.5 MG/1
2.5 TABLET ORAL DAILY
Status: DISCONTINUED | OUTPATIENT
Start: 2018-11-05 | End: 2018-11-05

## 2018-11-05 RX ORDER — HYDROCHLOROTHIAZIDE 25 MG/1
25 TABLET ORAL DAILY
Status: DISCONTINUED | OUTPATIENT
Start: 2018-11-05 | End: 2018-11-05

## 2018-11-05 RX ADMIN — ACETAMINOPHEN 650 MG: 325 TABLET, FILM COATED ORAL at 21:45

## 2018-11-05 RX ADMIN — MULTIPLE VITAMINS W/ MINERALS TAB 1 TABLET: TAB at 10:12

## 2018-11-05 RX ADMIN — IPRATROPIUM BROMIDE AND ALBUTEROL SULFATE 1 AMPULE: .5; 3 SOLUTION RESPIRATORY (INHALATION) at 00:14

## 2018-11-05 RX ADMIN — METHYLPREDNISOLONE SODIUM SUCCINATE 125 MG: 125 INJECTION, POWDER, LYOPHILIZED, FOR SOLUTION INTRAMUSCULAR; INTRAVENOUS at 00:11

## 2018-11-05 RX ADMIN — CARVEDILOL 6.25 MG: 6.25 TABLET, FILM COATED ORAL at 17:05

## 2018-11-05 RX ADMIN — SODIUM CHLORIDE, PRESERVATIVE FREE 10 ML: 5 INJECTION INTRAVENOUS at 10:18

## 2018-11-05 RX ADMIN — PANTOPRAZOLE SODIUM 40 MG: 40 INJECTION, POWDER, FOR SOLUTION INTRAVENOUS at 10:15

## 2018-11-05 RX ADMIN — IPRATROPIUM BROMIDE AND ALBUTEROL SULFATE 1 AMPULE: .5; 3 SOLUTION RESPIRATORY (INHALATION) at 08:38

## 2018-11-05 RX ADMIN — ACETAMINOPHEN 650 MG: 325 TABLET, FILM COATED ORAL at 14:24

## 2018-11-05 RX ADMIN — OXYBUTYNIN CHLORIDE 5 MG: 5 TABLET, FILM COATED, EXTENDED RELEASE ORAL at 21:45

## 2018-11-05 RX ADMIN — ONDANSETRON HYDROCHLORIDE 4 MG: 2 INJECTION, SOLUTION INTRAMUSCULAR; INTRAVENOUS at 22:16

## 2018-11-05 RX ADMIN — SODIUM CHLORIDE, PRESERVATIVE FREE 10 ML: 5 INJECTION INTRAVENOUS at 10:32

## 2018-11-05 RX ADMIN — ACETAMINOPHEN 650 MG: 325 TABLET, FILM COATED ORAL at 10:29

## 2018-11-05 RX ADMIN — OYSTER SHELL CALCIUM WITH VITAMIN D 1 TABLET: 500; 200 TABLET, FILM COATED ORAL at 10:15

## 2018-11-05 RX ADMIN — SODIUM CHLORIDE, PRESERVATIVE FREE 10 ML: 5 INJECTION INTRAVENOUS at 21:45

## 2018-11-05 RX ADMIN — SODIUM CHLORIDE, PRESERVATIVE FREE 10 ML: 5 INJECTION INTRAVENOUS at 10:33

## 2018-11-05 RX ADMIN — PRAVASTATIN SODIUM 10 MG: 10 TABLET ORAL at 21:45

## 2018-11-05 RX ADMIN — FUROSEMIDE 40 MG: 10 INJECTION, SOLUTION INTRAVENOUS at 00:45

## 2018-11-05 RX ADMIN — SODIUM CHLORIDE, POTASSIUM CHLORIDE, SODIUM LACTATE AND CALCIUM CHLORIDE: 600; 310; 30; 20 INJECTION, SOLUTION INTRAVENOUS at 10:58

## 2018-11-05 RX ADMIN — IPRATROPIUM BROMIDE AND ALBUTEROL SULFATE 1 AMPULE: .5; 3 SOLUTION RESPIRATORY (INHALATION) at 12:19

## 2018-11-05 RX ADMIN — SODIUM CHLORIDE: 9 INJECTION, SOLUTION INTRAVENOUS at 21:47

## 2018-11-05 RX ADMIN — CARVEDILOL 6.25 MG: 6.25 TABLET, FILM COATED ORAL at 10:12

## 2018-11-05 ASSESSMENT — PAIN SCALES - GENERAL
PAINLEVEL_OUTOF10: 0
PAINLEVEL_OUTOF10: 8
PAINLEVEL_OUTOF10: 0
PAINLEVEL_OUTOF10: 0

## 2018-11-05 ASSESSMENT — PAIN DESCRIPTION - PAIN TYPE
TYPE: ACUTE PAIN
TYPE: ACUTE PAIN

## 2018-11-05 ASSESSMENT — PAIN DESCRIPTION - DESCRIPTORS: DESCRIPTORS: ACHING;DISCOMFORT

## 2018-11-05 ASSESSMENT — PAIN DESCRIPTION - ORIENTATION: ORIENTATION: RIGHT;LOWER

## 2018-11-05 ASSESSMENT — PAIN DESCRIPTION - LOCATION: LOCATION: BACK

## 2018-11-05 NOTE — OP NOTE
Department of Interventional Radiology Procedure Note  Weekday [phone] I Night/Weekend [phone]        Date: 11/5/2018     Interventional Radiologist: Geoffrey Pedroza    Procedure Performed:  right renal biopsy    H&P Status: H&P was reviewed, the patient was examined and no change has occurred in patient's condition since H&P was completed. Pre-procedure Diagnosis: renal insufficiency    Post-procedure Diagnosis:  *renal insufficiency*    Sedation:  conscious sedation    Contrast used:   none    Estimated blood loss:   none    Preliminary Findings:  *several core samples of the right kidney were obtained    Complications:  none    Full Report to Follow.     Karilyn Ganser

## 2018-11-05 NOTE — H&P
normal, no murmurs, clicks, gallops or rubs  GI: Soft, ND/NT,No guarding/rebound/mass/organomegaly. Bowel sounds are normal.    MUSCULAR/EXT:  no pedal edema, no clubbing or cyanosis,Pulses 2+ B/L  CNS: Awake, alert. Cranial nerves intact, no focal neurological deficits.    MOOD/PSYCH: Normal mood and affect  SKIN: Warm and dry,No rashes    Lab results:   Results for orders placed or performed during the hospital encounter of 11/04/18   CBC Auto Differential   Result Value Ref Range    WBC 12.9 (H) 4.0 - 10.5 K/CU MM    RBC 3.68 (L) 4.2 - 5.4 M/CU MM    Hemoglobin 11.2 (L) 12.5 - 16.0 GM/DL    Hematocrit 35.2 (L) 37 - 47 %    MCV 95.7 78 - 100 FL    MCH 30.4 27 - 31 PG    MCHC 31.8 (L) 32.0 - 36.0 %    RDW 13.4 11.7 - 14.9 %    Platelets 186 866 - 398 K/CU MM    MPV 10.1 7.5 - 11.1 FL    Differential Type AUTOMATED DIFFERENTIAL     Segs Relative 82.7 (H) 36 - 66 %    Lymphocytes % 8.8 (L) 24 - 44 %    Monocytes % 5.9 (H) 0 - 4 %    Eosinophils % 1.6 0 - 3 %    Basophils % 0.5 0 - 1 %    Segs Absolute 10.7 K/CU MM    Lymphocytes # 1.1 K/CU MM    Monocytes # 0.8 K/CU MM    Eosinophils # 0.2 K/CU MM    Basophils # 0.1 K/CU MM    Nucleated RBC % 0.0 %    Total Nucleated RBC 0.0 K/CU MM    Total Immature Neutrophil 0.06 K/CU MM    Immature Neutrophil % 0.5 (H) 0 - 0.43 %   CMP   Result Value Ref Range    Sodium 136 135 - 145 MMOL/L    Potassium 3.7 3.5 - 5.1 MMOL/L    Chloride 98 (L) 99 - 110 mMol/L    CO2 24 21 - 32 MMOL/L    BUN 23 6 - 23 MG/DL    CREATININE 1.2 (H) 0.6 - 1.1 MG/DL    Glucose 150 (H) 70 - 99 MG/DL    Calcium 9.0 8.3 - 10.6 MG/DL    Alb 4.2 3.4 - 5.0 GM/DL    Total Protein 7.1 6.4 - 8.2 GM/DL    Total Bilirubin 0.3 0.0 - 1.0 MG/DL    ALT 9 (L) 10 - 40 U/L    AST 17 15 - 37 IU/L    Alkaline Phosphatase 82 40 - 128 IU/L    GFR Non- 43 (L) >60 mL/min/1.73m2    GFR  52 (L) >60 mL/min/1.73m2    Anion Gap 14 4 - 16   Troponin   Result Value Ref Range    Troponin T <0.010 <0.01 NG/ML   CK   Result Value Ref Range    Total CK 44 26 - 140 IU/L   Brain Natriuretic Peptide   Result Value Ref Range    Pro-BNP 5,765 (H) <300 PG/ML   Lipase   Result Value Ref Range    Lipase 17 13 - 60 IU/L   PT - INR   Result Value Ref Range    Protime 12.6 (H) 9.12 - 12.5 SECONDS    INR 1.11 INDEX   PTT   Result Value Ref Range    aPTT 33.1 (H) 21.2 - 33.0 SECONDS     XR CHEST PORTABLE   Final Result   New bibasilar predominant reticular opacities concerning for interstitial   edema. CTA NECK W CONTRAST   Final Result   1. No acute intracranial abnormality. 2. No acute arterial abnormality in the head or neck. 3. Mild chronic white matter microvascular ischemic changes. 4. Prior left parasellar aneurysm coiling. No evidence of residual aneurysm   filling. 5. Severe atherosclerotic stenosis of the proximal arch vessels and distal   left common carotid artery as detailed above. 6. Severe diffuse stenosis of the right vertebral artery. 7. No left vertebral artery stenosis. 8. Mild stenosis of the proximal internal carotid arteries measuring less   than 25% per NASCET criteria. CTA HEAD W WO CONTRAST   Final Result   1. No acute intracranial abnormality. 2. No acute arterial abnormality in the head or neck. 3. Mild chronic white matter microvascular ischemic changes. 4. Prior left parasellar aneurysm coiling. No evidence of residual aneurysm   filling. 5. Severe atherosclerotic stenosis of the proximal arch vessels and distal   left common carotid artery as detailed above. 6. Severe diffuse stenosis of the right vertebral artery. 7. No left vertebral artery stenosis. 8. Mild stenosis of the proximal internal carotid arteries measuring less   than 25% per NASCET criteria. XR CHEST PORTABLE   Final Result   No acute cardiopulmonary abnormality.              ASSESSMENT/IMPRESSION:      Flash pulmonary edema (HCC)/hypertensive emergency/acute diastolic heart

## 2018-11-05 NOTE — ED NOTES
Dr. Hieu Senior made aware of high blood pressure systolic 660        [facility], [address] [place] [address]  11/05/18 8837

## 2018-11-05 NOTE — ED PROVIDER NOTES
[facility]      TRIAGE CHIEF COMPLAINT:   Headache; Nausea; and Dizziness      [place]:  General Tello is a 82 y.o. female that presents with complaint of headache nausea vomiting dizziness. Patient states around 6:30 she developed headache nausea vomiting dizziness. Take is generalized. She has a history of brain aneurysm that was coiled. Has any fevers chest pain shortness of breath diaphoresis weakness facial droop unilateral numbness or weakness. No abdominal pain she  does have an upset stomach from vomiting. Had some diarrhea. Did not pass out and did not hit her head. She had a head injury about a month ago. She is off blood thinners for a possible kidney biopsy soon. She has not had any medicine for her head. REVIEW OF SYSTEMS:  At least 10 systems reviewed and otherwise acutely negative except as in the [address] Review of Systems   Constitutional: Positive for fatigue. HENT: Negative. Eyes: Positive for photophobia. Respiratory: Negative. Cardiovascular: Negative. Gastrointestinal: Positive for diarrhea, nausea and vomiting. Endocrine: Negative. Genitourinary: Negative. Musculoskeletal: Negative. Skin: Negative. Allergic/Immunologic: Negative. Neurological: Positive for dizziness and headaches. Hematological: Negative. Psychiatric/Behavioral: Negative. All other systems reviewed and are negative.       Past Medical History:   Diagnosis Date    Acute renal failure ([facility] [address] [address] 4/17/2017    CAD (coronary artery disease)     Chronic kidney disease     Hyperlipidemia     Hypertension     TIA (transient ischemic attack)      Past Surgical History:   Procedure Laterality Date    BRAIN ANEURYSM SURGERY      BRAIN SURGERY      CARDIAC CATHETERIZATION      CHOLECYSTECTOMY       Family History   Problem Relation Age of Onset    High Blood Pressure Mother     Heart Disease Mother     Stroke Brother     Stroke Paternal Grandfather aspirin chewable tablet 81 mg  81 mg Oral Daily Silver Dolan MD        pantoprazole (PROTONIX) injection 40 mg  40 mg Intravenous Daily Silver Dolan MD        morphine injection 2 mg  2 mg Intravenous Q4H PRN Silver Dolan MD        sodium chloride flush 0.9 % injection 10 mL  10 mL Intravenous 2 times per day Silver Dolan MD        sodium chloride flush 0.9 % injection 10 mL  10 mL Intravenous PRN Silver Dolan MD        ondansetron [facility]) injection 4 mg  4 mg Intravenous Q6H PRN Silver Dolan MD        enoxaparin (LOVENOX) injection 30 mg  30 mg Subcutaneous Daily Silver Dolan MD        magnesium hydroxide (MILK OF MAGNESIA) 400 MG/5ML suspension 30 mL  30 mL Oral Daily PRN Silver Dolan MD        nitroGLYCERIN 50 mg in dextrose 5% 250 mL infusion  5 mcg/min Intravenous Continuous Silver Dolan MD   Stopped at 11/05/18 0239    ipratropium-albuterol (DUONEB) nebulizer solution 1 ampule  1 ampule Inhalation Q4H WA Silver Dolan MD        carvedilol (COREG) tablet 6.25 mg  6.25 mg Oral BID WC Silver Dolan MD        lisinopril (PRINIVIL;ZESTRIL) tablet 2.5 mg  2.5 mg Oral Daily Silver Dolan MD        hydrochlorothiazide (HYDRODIURIL) tablet 25 mg  25 mg Oral Daily Silver Dolan MD        sodium chloride flush 0.9 % injection 10 mL  10 mL Intravenous BID Jarad Bowens DO   10 mL at 11/04/18 5104       Nursing Notes Reviewed    VITAL SIGNS:  ED Triage Vitals [11/04/18 2026]   Enc Vitals Group      BP (!) 142/71      Pulse       Resp 18      Temp 97.6 °F (36.4 °C)      Temp src       SpO2       Weight 121 lb (54.9 kg)      Height 5' (1.524 m)      Head Circumference       Peak Flow       Pain Score       Pain Loc       Pain Edu? Excl. in [address]? PHYSICAL EXAM:  Physical Exam   Constitutional: She is oriented to person, place, and time. She appears well-developed and well-nourished. She is cooperative. Non-toxic appearance.  She does not have a sickly 33.1 (H) 21.2 - 33.0 SECONDS        Radiographs (if obtained):  [] The following radiograph was interpreted by myself in the absence of a radiologist:  [x] Radiologist's Report Reviewed:    CTA head/neck, CXR    EKG (if obtained): (All EKG's are interpreted by myself in the absence of a cardiologist)    12 lead EKG per my interpretation:  Normal Sinus Rhythm 86  Axis is   Normal  QTc is  469  There is no specific T wave changes appreciated. There is specific ST wave changes appreciated. ST depression V4 through V6    Prior EKG to compare with was available and similar previous. Total critical care time today provided was at least 35 minutes. This excludes seperately billable procedure. Critical care time provided for reviewing labs, images consulting medicine starting BiPAP starting oxygen started breathing treatments, steroids, nitro, Lasix that required close evaluation and/or intervention with concern for patient decompensation. MDM:    Patient here with headache nausea vomiting dizziness. Again around 6:30 tonight she had a headache that developed generalized. States she's had several episodes of vomiting. Dizzy. Denies any loss of consciousness chest pain shortness of breath neck pain fall abdominal pain. Has had some upset stomach and diarrhea. Vital signs are stable we'll give her migraine cocktail, nausea medicine Pepcid. Will image her head and neck CTA. She's been off her blood thinners. She has a history of brain aneurysm no scleral past.  She states she had a head injury about a month ago. She is supposed to have a renal biopsy soon. On arrival I do not see any signs of a stroke. She has a normal finger to nose no sensory deficits no weakness. Patient rechecked. Unfortunately I was involved in the care of another very critical patient upon reevaluation I walked in patient's room patient states she's having a very difficult time breathing. Patient just returned from CT scan. She states she has no allergies to contrast she has had this before. She has no chest pain but she states she's very short of breath. On examination patient is subtly critically ill. She has rales she is tachycardic she is hypoxic at 78% on room air. Patient initially put on oxygen and BiPAP with good results. Patient appears to have flash pulmonary edema her blood pressure is also very elevated and concerned about hypertensive emergency. Her BNP is markedly elevated at over 5000. Troponins negative. She has no chest pain. Patient immediately BiPAP oxygen and Lasix given nitro given fluids were discontinued. Patient given breathing treatments and steroids. Also given treatment for possible allergic reaction. At this time she states she has no airway swelling or tongue swelling or rash or itching. Patient looks and feels markedly better with BiPAP. Repeat chest x-ray shows pulmonary edema which is acute prefers x-ray was normal.  Patient is stable. Also critically ill at this time was admitted for observation for respiratory failure, elevated BNP, headache and dizziness. Her CTs appear normal no acute problem after migraine cocktail her headache has resolved. Patient admitted.     CLINICAL IMPRESSION:  Final diagnoses:   Nausea   Dizziness   Nonintractable headache, unspecified chronicity pattern, unspecified headache type   General ill feeling   Acute respiratory failure with hypoxia (HCC)   Elevated brain natriuretic peptide (BNP) level   Leukocytosis, unspecified type       (Please note that portions of this note may have been completed with a voice recognition program. Efforts were made to edit the dictations but occasionally words aremis-transcribed.)    DISPOSITION REFERRAL (if applicable):  Ortega Mcdaniels DO  [address]  [address] (if applicable):  Current Discharge Medication List             Jarad Bowens DO

## 2018-11-05 NOTE — ED NOTES
Verbal order received from Dr. Dc Holman to administer 125mg IV solumedrol      Antonio Rivero, ERLINDA  11/05/18 6458

## 2018-11-05 NOTE — PROGRESS NOTES
I met with patient on 11/5/18 for bedside tobacco consult. Naz Gonsalves stated that she smokes about 20 cigarettes per day. I explained that [facility] cares about her health and advised her to quit. Naz Gonsalves stated that she would like to quit. Naz Gonsalves is not using the nicotine patch and not having intense cravings. We discussed health concerns, reported by the patient-CAD, kidney-and the benefits of quitting. We discussed building a quit plan, identifying triggers, ways to fight cravings, modifying behaviors and building a kit quit to assist. I explained the REACH cessation program, provided educational information, and strongly encouraged Naz Gonsalves to contact me for outpatient services.      Jose Enrique Mccabe, Certified Tobacco Treatment Specialist, [facility]

## 2018-11-06 LAB
ANION GAP SERPL CALCULATED.3IONS-SCNC: 14 MMOL/L (ref 4–16)
BACTERIA: NEGATIVE /HPF
BILIRUBIN URINE: NEGATIVE MG/DL
BLOOD, URINE: ABNORMAL
BUN BLDV-MCNC: 38 MG/DL (ref 6–23)
CALCIUM SERPL-MCNC: 8.3 MG/DL (ref 8.3–10.6)
CHLORIDE BLD-SCNC: 98 MMOL/L (ref 99–110)
CHOLESTEROL: 227 MG/DL
CLARITY: CLEAR
CO2: 25 MMOL/L (ref 21–32)
COLOR: YELLOW
CREAT SERPL-MCNC: 2.4 MG/DL (ref 0.6–1.1)
CREATININE URINE: 51.9 MG/DL (ref 28–217)
GFR AFRICAN AMERICAN: 23 ML/MIN/1.73M2
GFR NON-AFRICAN AMERICAN: 19 ML/MIN/1.73M2
GLUCOSE BLD-MCNC: 138 MG/DL (ref 70–99)
GLUCOSE, URINE: NEGATIVE MG/DL
HCT VFR BLD CALC: 31.6 % (ref 37–47)
HDLC SERPL-MCNC: 61 MG/DL
HEMOGLOBIN: 10.4 GM/DL (ref 12.5–16)
KETONES, URINE: NEGATIVE MG/DL
LDL CHOLESTEROL DIRECT: 154 MG/DL
LEUKOCYTE ESTERASE, URINE: NEGATIVE
LV EF: 50 %
LVEF MODALITY: NORMAL
MCH RBC QN AUTO: 31.5 PG (ref 27–31)
MCHC RBC AUTO-ENTMCNC: 32.9 % (ref 32–36)
MCV RBC AUTO: 95.8 FL (ref 78–100)
MUCUS: ABNORMAL HPF
NITRITE URINE, QUANTITATIVE: NEGATIVE
PDW BLD-RTO: 13.6 % (ref 11.7–14.9)
PH, URINE: 6 (ref 5–8)
PLATELET # BLD: 308 K/CU MM (ref 140–440)
PMV BLD AUTO: 10.3 FL (ref 7.5–11.1)
POTASSIUM SERPL-SCNC: 4.5 MMOL/L (ref 3.5–5.1)
PROT/CREAT RATIO, UR: 1.2
PROTEIN UA: 100 MG/DL
RBC # BLD: 3.3 M/CU MM (ref 4.2–5.4)
RBC URINE: 102 /HPF (ref 0–6)
SODIUM BLD-SCNC: 137 MMOL/L (ref 135–145)
SODIUM URINE: 23 MMOL/L (ref 35–167)
SPECIFIC GRAVITY UA: 1.01 (ref 1–1.03)
SQUAMOUS EPITHELIAL: <1 /HPF
TRICHOMONAS: ABNORMAL /HPF
TRIGL SERPL-MCNC: 147 MG/DL
TROPONIN T: 0.02 NG/ML
URINE TOTAL PROTEIN: 60.4 MG/DL
UROBILINOGEN, URINE: NORMAL MG/DL (ref 0.2–1)
WBC # BLD: 18 K/CU MM (ref 4–10.5)
WBC UA: 3 /HPF (ref 0–5)

## 2018-11-06 PROCEDURE — 6360000002 HC RX W HCPCS: Performed by: INTERNAL MEDICINE

## 2018-11-06 PROCEDURE — 85027 COMPLETE CBC AUTOMATED: CPT

## 2018-11-06 PROCEDURE — 84156 ASSAY OF PROTEIN URINE: CPT

## 2018-11-06 PROCEDURE — 2140000000 HC CCU INTERMEDIATE R&B

## 2018-11-06 PROCEDURE — 83721 ASSAY OF BLOOD LIPOPROTEIN: CPT

## 2018-11-06 PROCEDURE — C9113 INJ PANTOPRAZOLE SODIUM, VIA: HCPCS | Performed by: INTERNAL MEDICINE

## 2018-11-06 PROCEDURE — 7100000000 HC PACU RECOVERY - FIRST 15 MIN

## 2018-11-06 PROCEDURE — 94761 N-INVAS EAR/PLS OXIMETRY MLT: CPT

## 2018-11-06 PROCEDURE — 2700000000 HC OXYGEN THERAPY PER DAY

## 2018-11-06 PROCEDURE — 2580000003 HC RX 258: Performed by: INTERNAL MEDICINE

## 2018-11-06 PROCEDURE — 93312 ECHO TRANSESOPHAGEAL: CPT

## 2018-11-06 PROCEDURE — 6370000000 HC RX 637 (ALT 250 FOR IP): Performed by: INTERNAL MEDICINE

## 2018-11-06 PROCEDURE — 81001 URINALYSIS AUTO W/SCOPE: CPT

## 2018-11-06 PROCEDURE — 80061 LIPID PANEL: CPT

## 2018-11-06 PROCEDURE — 80048 BASIC METABOLIC PNL TOTAL CA: CPT

## 2018-11-06 PROCEDURE — 94640 AIRWAY INHALATION TREATMENT: CPT

## 2018-11-06 PROCEDURE — 7100000001 HC PACU RECOVERY - ADDTL 15 MIN

## 2018-11-06 PROCEDURE — 82570 ASSAY OF URINE CREATININE: CPT

## 2018-11-06 PROCEDURE — 84300 ASSAY OF URINE SODIUM: CPT

## 2018-11-06 PROCEDURE — 84484 ASSAY OF TROPONIN QUANT: CPT

## 2018-11-06 PROCEDURE — 2580000003 HC RX 258: Performed by: EMERGENCY MEDICINE

## 2018-11-06 PROCEDURE — 36415 COLL VENOUS BLD VENIPUNCTURE: CPT

## 2018-11-06 RX ORDER — ALBUTEROL SULFATE 2.5 MG/3ML
2.5 SOLUTION RESPIRATORY (INHALATION) ONCE
Status: COMPLETED | OUTPATIENT
Start: 2018-11-06 | End: 2018-11-06

## 2018-11-06 RX ORDER — SODIUM CHLORIDE 9 MG/ML
INJECTION, SOLUTION INTRAVENOUS CONTINUOUS
Status: DISCONTINUED | OUTPATIENT
Start: 2018-11-06 | End: 2018-11-06

## 2018-11-06 RX ADMIN — PRAVASTATIN SODIUM 10 MG: 10 TABLET ORAL at 22:33

## 2018-11-06 RX ADMIN — SODIUM CHLORIDE, PRESERVATIVE FREE 10 ML: 5 INJECTION INTRAVENOUS at 22:33

## 2018-11-06 RX ADMIN — ALBUTEROL SULFATE 2.5 MG: 2.5 SOLUTION RESPIRATORY (INHALATION) at 09:04

## 2018-11-06 RX ADMIN — IPRATROPIUM BROMIDE AND ALBUTEROL SULFATE 1 AMPULE: .5; 3 SOLUTION RESPIRATORY (INHALATION) at 20:10

## 2018-11-06 RX ADMIN — ASPIRIN 81 MG CHEWABLE TABLET 81 MG: 81 TABLET CHEWABLE at 09:54

## 2018-11-06 RX ADMIN — OYSTER SHELL CALCIUM WITH VITAMIN D 1 TABLET: 500; 200 TABLET, FILM COATED ORAL at 09:54

## 2018-11-06 RX ADMIN — CARVEDILOL 6.25 MG: 6.25 TABLET, FILM COATED ORAL at 09:54

## 2018-11-06 RX ADMIN — OXYBUTYNIN CHLORIDE 5 MG: 5 TABLET, FILM COATED, EXTENDED RELEASE ORAL at 22:33

## 2018-11-06 RX ADMIN — PANTOPRAZOLE SODIUM 40 MG: 40 INJECTION, POWDER, FOR SOLUTION INTRAVENOUS at 09:54

## 2018-11-06 RX ADMIN — ACETAMINOPHEN 650 MG: 325 TABLET, FILM COATED ORAL at 22:33

## 2018-11-06 RX ADMIN — IPRATROPIUM BROMIDE AND ALBUTEROL SULFATE 1 AMPULE: .5; 3 SOLUTION RESPIRATORY (INHALATION) at 12:12

## 2018-11-06 RX ADMIN — ACETAMINOPHEN 650 MG: 325 TABLET, FILM COATED ORAL at 16:13

## 2018-11-06 RX ADMIN — MULTIPLE VITAMINS W/ MINERALS TAB 1 TABLET: TAB at 09:54

## 2018-11-06 RX ADMIN — IPRATROPIUM BROMIDE AND ALBUTEROL SULFATE 1 AMPULE: .5; 3 SOLUTION RESPIRATORY (INHALATION) at 16:01

## 2018-11-06 RX ADMIN — CARVEDILOL 6.25 MG: 6.25 TABLET, FILM COATED ORAL at 16:13

## 2018-11-06 RX ADMIN — SODIUM CHLORIDE, PRESERVATIVE FREE 10 ML: 5 INJECTION INTRAVENOUS at 09:55

## 2018-11-06 ASSESSMENT — PAIN SCALES - GENERAL
PAINLEVEL_OUTOF10: 0

## 2018-11-06 NOTE — PROGRESS NOTES
Nephrology Progress Note  11/6/2018 6:52 AM        Subjective:   Admit Date: 11/4/2018  PCP: Merrick De La Cruz DO    Interval History: underwent renal bx- hematuria , some clott , BP ok mild flank pain    Diet: some    ROS:  Some wheezing, no orthopnea now     Data:     Current med's:    acetaminophen  650 mg Oral 3 times per day    calcium-vitamin D  1 tablet Oral Daily    therapeutic multivitamin-minerals  1 tablet Oral Daily    oxybutynin  5 mg Oral Nightly    pravastatin  10 mg Oral Daily    aspirin  81 mg Oral Daily    pantoprazole  40 mg Intravenous Daily    sodium chloride flush  10 mL Intravenous 2 times per day    enoxaparin  30 mg Subcutaneous Daily    ipratropium-albuterol  1 ampule Inhalation Q4H WA    carvedilol  6.25 mg Oral BID WC    sodium chloride flush  10 mL Intravenous BID      sodium chloride Stopped (11/06/18 0556)         I/O last 3 completed shifts: In: 140 [P.O.:120;  I.V.:20]  Out: -     CBC:   Recent Labs      11/04/18 2156 11/06/18   0406   WBC  12.9*  18.0*   HGB  11.2*  10.4*   PLT  318  308          Recent Labs      11/04/18 2156 11/06/18   0406   NA  136  137   K  3.7  4.5   CL  98*  98*   CO2  24  25   BUN  23  38*   CREATININE  1.2*  2.4*   GLUCOSE  150*  138*       Lab Results   Component Value Date    CALCIUM 8.3 11/06/2018    PHOS 3.0 03/20/2017       Objective:     Vitals: /70   Pulse 103   Temp 98.3 °F (36.8 °C) (Oral)   Resp 19   Ht 5' (1.524 m)   Wt 119 lb 11.2 oz (54.3 kg)   SpO2 93%   BMI 23.38 kg/m²     General appearance:  No acute distress  HEENT:  +pallor  Neck:  supple  Lungs:  + few exp wheeze  Heart:  Seems RRR  Abdomen: soft  Extremities:  No edema yet       Problem List :         Impression :     1. AKI_  She had multiple erenal insult - contrast- drastic BP drop/ loop/ Bx / bleeding etc - I expect to settle down- she also likely has MARY JANE  2. proteinuria with high kappa chain- had renal Bx- suspect -= plasma cell dyscrasia- followed my

## 2018-11-06 NOTE — PROGRESS NOTES
Daily Progress Note     patient is feeling better  No chest pain  Still has wheezing  DARLYN shows EF 50% with severe MR   Acute renal injury  S/p renal bx  HTN    Echo--Summary   Left ventricular systolic function is normal.   Ejection fraction is visually estimated at 63-45%.   Diastolic dysfunction is present.   Moderately dilated left atrium.   Calcified aortic valve with mean gradient of 11 mmHg.   Moderate mitral regurgitation is present.   Calcified mitral valve with mild to moderate mitral stenosis and a mean   gradient of 8 mmHg.   Mild tricuspid regurgitation.   Mild PHTN with RVSP of 40 mmHg.   No evidence of any pericardial effusion.     Objective:   BP (!) 140/60   Pulse 78   Temp 98.3 °F (36.8 °C) (Oral)   Resp 21   Ht 5' (1.524 m)   Wt 119 lb 11.2 oz (54.3 kg)   SpO2 93%   BMI 23.38 kg/m²     Intake/Output Summary (Last 24 hours) at 11/06/18 0950  Last data filed at 11/06/18 0535   Gross per 24 hour   Intake              530 ml   Output                0 ml   Net              530 ml       Medications:   Scheduled Meds:   acetaminophen  650 mg Oral 3 times per day    calcium-vitamin D  1 tablet Oral Daily    therapeutic multivitamin-minerals  1 tablet Oral Daily    oxybutynin  5 mg Oral Nightly    pravastatin  10 mg Oral Daily    aspirin  81 mg Oral Daily    pantoprazole  40 mg Intravenous Daily    sodium chloride flush  10 mL Intravenous 2 times per day    enoxaparin  30 mg Subcutaneous Daily    ipratropium-albuterol  1 ampule Inhalation Q4H WA    carvedilol  6.25 mg Oral BID WC    sodium chloride flush  10 mL Intravenous BID      Infusions:   sodium chloride        PRN Meds:  nitroGLYCERIN, morphine, sodium chloride flush, ondansetron, magnesium hydroxide       Physical Exam:  Vitals:    11/06/18 0934   BP: (!) 140/60   Pulse: 78   Resp: 21   Temp:    SpO2: 93%        General: AAO, NAD  Chest: Nontender  Cardiac: First and Second Heart Sounds are Normal, No Murmurs or Gallops

## 2018-11-07 ENCOUNTER — APPOINTMENT (OUTPATIENT)
Dept: GENERAL RADIOLOGY | Age: 83
DRG: 291 | End: 2018-11-07
Payer: MEDICARE

## 2018-11-07 LAB
ALBUMIN SERPL-MCNC: 3.5 GM/DL (ref 3.4–5)
ALP BLD-CCNC: 67 IU/L (ref 40–129)
ALT SERPL-CCNC: 17 U/L (ref 10–40)
ANION GAP SERPL CALCULATED.3IONS-SCNC: 10 MMOL/L (ref 4–16)
AST SERPL-CCNC: 23 IU/L (ref 15–37)
BILIRUB SERPL-MCNC: 0.3 MG/DL (ref 0–1)
BUN BLDV-MCNC: 42 MG/DL (ref 6–23)
CALCIUM SERPL-MCNC: 8.1 MG/DL (ref 8.3–10.6)
CHLORIDE BLD-SCNC: 104 MMOL/L (ref 99–110)
CO2: 25 MMOL/L (ref 21–32)
CREAT SERPL-MCNC: 1.7 MG/DL (ref 0.6–1.1)
GFR AFRICAN AMERICAN: 35 ML/MIN/1.73M2
GFR NON-AFRICAN AMERICAN: 29 ML/MIN/1.73M2
GLUCOSE BLD-MCNC: 93 MG/DL (ref 70–99)
MAGNESIUM: 2.2 MG/DL (ref 1.8–2.4)
PHOSPHORUS: 3.6 MG/DL (ref 2.5–4.9)
POTASSIUM SERPL-SCNC: 4.4 MMOL/L (ref 3.5–5.1)
PRO-BNP: 8159 PG/ML
SODIUM BLD-SCNC: 139 MMOL/L (ref 135–145)
TOTAL PROTEIN: 5.6 GM/DL (ref 6.4–8.2)

## 2018-11-07 PROCEDURE — G8978 MOBILITY CURRENT STATUS: HCPCS

## 2018-11-07 PROCEDURE — 84100 ASSAY OF PHOSPHORUS: CPT

## 2018-11-07 PROCEDURE — 94761 N-INVAS EAR/PLS OXIMETRY MLT: CPT

## 2018-11-07 PROCEDURE — 94640 AIRWAY INHALATION TREATMENT: CPT

## 2018-11-07 PROCEDURE — 83880 ASSAY OF NATRIURETIC PEPTIDE: CPT

## 2018-11-07 PROCEDURE — 6370000000 HC RX 637 (ALT 250 FOR IP): Performed by: INTERNAL MEDICINE

## 2018-11-07 PROCEDURE — 97162 PT EVAL MOD COMPLEX 30 MIN: CPT

## 2018-11-07 PROCEDURE — 2580000003 HC RX 258: Performed by: INTERNAL MEDICINE

## 2018-11-07 PROCEDURE — 2700000000 HC OXYGEN THERAPY PER DAY

## 2018-11-07 PROCEDURE — G8979 MOBILITY GOAL STATUS: HCPCS

## 2018-11-07 PROCEDURE — 6360000002 HC RX W HCPCS: Performed by: INTERNAL MEDICINE

## 2018-11-07 PROCEDURE — 80053 COMPREHEN METABOLIC PANEL: CPT

## 2018-11-07 PROCEDURE — G8980 MOBILITY D/C STATUS: HCPCS

## 2018-11-07 PROCEDURE — 2140000000 HC CCU INTERMEDIATE R&B

## 2018-11-07 PROCEDURE — 83735 ASSAY OF MAGNESIUM: CPT

## 2018-11-07 PROCEDURE — 97116 GAIT TRAINING THERAPY: CPT

## 2018-11-07 PROCEDURE — C9113 INJ PANTOPRAZOLE SODIUM, VIA: HCPCS | Performed by: INTERNAL MEDICINE

## 2018-11-07 PROCEDURE — 36415 COLL VENOUS BLD VENIPUNCTURE: CPT

## 2018-11-07 PROCEDURE — 71045 X-RAY EXAM CHEST 1 VIEW: CPT

## 2018-11-07 RX ORDER — LOSARTAN POTASSIUM AND HYDROCHLOROTHIAZIDE 12.5; 5 MG/1; MG/1
1 TABLET ORAL DAILY
Status: DISCONTINUED | OUTPATIENT
Start: 2018-11-07 | End: 2018-11-08 | Stop reason: HOSPADM

## 2018-11-07 RX ORDER — CLOPIDOGREL BISULFATE 75 MG/1
75 TABLET ORAL DAILY
Status: DISCONTINUED | OUTPATIENT
Start: 2018-11-07 | End: 2018-11-08 | Stop reason: HOSPADM

## 2018-11-07 RX ORDER — CILOSTAZOL 100 MG/1
100 TABLET ORAL DAILY
Status: DISCONTINUED | OUTPATIENT
Start: 2018-11-07 | End: 2018-11-08 | Stop reason: HOSPADM

## 2018-11-07 RX ORDER — FUROSEMIDE 20 MG/1
20 TABLET ORAL DAILY
Status: DISCONTINUED | OUTPATIENT
Start: 2018-11-07 | End: 2018-11-08

## 2018-11-07 RX ADMIN — ACETAMINOPHEN 650 MG: 325 TABLET, FILM COATED ORAL at 14:27

## 2018-11-07 RX ADMIN — LOSARTAN POTASSIUM AND HYDROCHLOROTHIAZIDE 1 TABLET: 12.5; 5 TABLET ORAL at 09:33

## 2018-11-07 RX ADMIN — FUROSEMIDE 20 MG: 20 TABLET ORAL at 09:33

## 2018-11-07 RX ADMIN — SODIUM CHLORIDE, PRESERVATIVE FREE 10 ML: 5 INJECTION INTRAVENOUS at 09:39

## 2018-11-07 RX ADMIN — IPRATROPIUM BROMIDE AND ALBUTEROL SULFATE 1 AMPULE: .5; 3 SOLUTION RESPIRATORY (INHALATION) at 20:40

## 2018-11-07 RX ADMIN — IPRATROPIUM BROMIDE AND ALBUTEROL SULFATE 1 AMPULE: .5; 3 SOLUTION RESPIRATORY (INHALATION) at 15:59

## 2018-11-07 RX ADMIN — SODIUM CHLORIDE, PRESERVATIVE FREE 10 ML: 5 INJECTION INTRAVENOUS at 21:27

## 2018-11-07 RX ADMIN — MULTIPLE VITAMINS W/ MINERALS TAB 1 TABLET: TAB at 09:33

## 2018-11-07 RX ADMIN — IPRATROPIUM BROMIDE AND ALBUTEROL SULFATE 1 AMPULE: .5; 3 SOLUTION RESPIRATORY (INHALATION) at 08:39

## 2018-11-07 RX ADMIN — CARVEDILOL 6.25 MG: 6.25 TABLET, FILM COATED ORAL at 09:33

## 2018-11-07 RX ADMIN — PRAVASTATIN SODIUM 10 MG: 10 TABLET ORAL at 21:27

## 2018-11-07 RX ADMIN — CLOPIDOGREL BISULFATE 75 MG: 75 TABLET ORAL at 09:33

## 2018-11-07 RX ADMIN — CILOSTAZOL 100 MG: 100 TABLET ORAL at 09:33

## 2018-11-07 RX ADMIN — ENOXAPARIN SODIUM 30 MG: 30 INJECTION SUBCUTANEOUS at 09:32

## 2018-11-07 RX ADMIN — OXYBUTYNIN CHLORIDE 5 MG: 5 TABLET, FILM COATED, EXTENDED RELEASE ORAL at 21:27

## 2018-11-07 RX ADMIN — ASPIRIN 81 MG CHEWABLE TABLET 81 MG: 81 TABLET CHEWABLE at 09:33

## 2018-11-07 RX ADMIN — ACETAMINOPHEN 650 MG: 325 TABLET, FILM COATED ORAL at 21:27

## 2018-11-07 RX ADMIN — PANTOPRAZOLE SODIUM 40 MG: 40 INJECTION, POWDER, FOR SOLUTION INTRAVENOUS at 09:31

## 2018-11-07 RX ADMIN — ACETAMINOPHEN 650 MG: 325 TABLET, FILM COATED ORAL at 06:06

## 2018-11-07 RX ADMIN — IPRATROPIUM BROMIDE AND ALBUTEROL SULFATE 1 AMPULE: .5; 3 SOLUTION RESPIRATORY (INHALATION) at 12:00

## 2018-11-07 RX ADMIN — OYSTER SHELL CALCIUM WITH VITAMIN D 1 TABLET: 500; 200 TABLET, FILM COATED ORAL at 09:33

## 2018-11-07 ASSESSMENT — PAIN SCALES - GENERAL
PAINLEVEL_OUTOF10: 0
PAINLEVEL_OUTOF10: 4
PAINLEVEL_OUTOF10: 0

## 2018-11-07 ASSESSMENT — PAIN DESCRIPTION - ORIENTATION: ORIENTATION: RIGHT;LOWER

## 2018-11-07 ASSESSMENT — PAIN DESCRIPTION - PAIN TYPE: TYPE: ACUTE PAIN

## 2018-11-07 ASSESSMENT — PAIN DESCRIPTION - LOCATION: LOCATION: BACK

## 2018-11-07 ASSESSMENT — PAIN DESCRIPTION - DESCRIPTORS: DESCRIPTORS: ACHING;DISCOMFORT

## 2018-11-07 NOTE — PROGRESS NOTES
Physical Therapy    Facility/Department: [facility]  Initial Assessment    NAME: Eros Mccarty  : 1935  MRN: [id number]    Date of Service: 2018    Discharge Recommendations:  Home with Home health PT (Family to check in periodically )   PT Equipment Recommendations  Equipment Needed: Yes  Other: Would benefit from tub transfer bench for bathing     Patient Diagnosis(es): The primary encounter diagnosis was Nausea. Diagnoses of Dizziness, Nonintractable headache, unspecified chronicity pattern, unspecified headache type, General ill feeling, Acute respiratory failure with hypoxia ([address] Elevated brain natriuretic peptide (BNP) level, and Leukocytosis, unspecified type were also pertinent to this visit. has a past medical history of Acute renal failure ([address] CAD (coronary artery disease); Chronic kidney disease; Hyperlipidemia; Hypertension; and TIA (transient ischemic attack). has a past surgical history that includes Cardiac catheterization; Cholecystectomy; brain surgery; and Brain aneurysm surgery.     Restrictions  Restrictions/Precautions  Restrictions/Precautions: General Precautions, Fall Risk  Position Activity Restriction  Other position/activity restrictions: portable tele, 2L O2, Per RN pt okay to be seen  Vision/Hearing  Vision: Within Functional Limits (reading glasses)  Hearing: Within functional limits     Subjective  General  Chart Reviewed: Yes  Patient assessed for rehabilitation services?: Yes  Family / Caregiver Present: Yes (son and daughter )  Follows Commands: Within Functional Limits  Pain Screening  Patient Currently in Pain: Denies  Vital Signs  Patient Currently in Pain: Denies       Orientation  Orientation  Overall Orientation Status: Within Normal Limits  Social/Functional History  Social/Functional History  Lives With: Alone (friends to check in, 3 children in [place])  Type of Home: Mobile home  Home Layout: One level  Home Access: Stairs to enter with

## 2018-11-07 NOTE — PROGRESS NOTES
600 ml   Output             1800 ml   Net            -1200 ml      Vitals:   Vitals:    11/07/18 0842   BP:    Pulse:    Resp:    Temp:    SpO2: 93%     Physical Exam:      Physical Exam   Constitutional: She appears well-developed. Pulmonary/Chest: Effort normal.   Neurological: She is alert.          Medications:   Medications:    losartan-hydrochlorothiazide  1 tablet Oral Daily    cilostazol  100 mg Oral Daily    clopidogrel  75 mg Oral Daily    furosemide  20 mg Oral Daily    acetaminophen  650 mg Oral 3 times per day    calcium-vitamin D  1 tablet Oral Daily    therapeutic multivitamin-minerals  1 tablet Oral Daily    oxybutynin  5 mg Oral Nightly    pravastatin  10 mg Oral Daily    aspirin  81 mg Oral Daily    pantoprazole  40 mg Intravenous Daily    sodium chloride flush  10 mL Intravenous 2 times per day    enoxaparin  30 mg Subcutaneous Daily    ipratropium-albuterol  1 ampule Inhalation Q4H WA    carvedilol  6.25 mg Oral BID WC    sodium chloride flush  10 mL Intravenous BID      Infusions:     PRN Meds:     nitroGLYCERIN 0.4 mg Q5 Min PRN   morphine 2 mg Q4H PRN   sodium chloride flush 10 mL PRN   ondansetron 4 mg Q6H PRN   magnesium hydroxide 30 mL Daily PRN         Electronically signed by Iraida Branham MD on 11/7/2018 at 2:31 PM

## 2018-11-07 NOTE — PROGRESS NOTES
Cardiology Progress Note       Eros Mccarty is a 82 y.o. female   1935     SUBJECTIVE:   Patient  Seen  And  Examined  Feels  Much  Better  No  Chest  pain  OBJECTIVE:    Review of Systems:  General appearance: alert, appears stated age and cooperative  Skin: Skin color, texture, normal. No rashes or lesions  HEENT: No nose bleed, headache, vision problems  CV: C/O chest pain, tightness, pressure,   Respiratory: C/o no SOB, BUSTOS, Orthopnea, PND  GI: No abdominal pain, black stool, bloating  Limbs: No c/o edema, pain, swelling, intermittent claudication, joint pains  Neuro: No dizziness, lightheadedness, syncope, gait problems, memory problems  Psych: grossly normal. No SI/depression. Vitals:   Blood pressure (!) 147/71, pulse 73, temperature 98.5 °F (36.9 °C), temperature source Oral, resp. rate 17, height 5' (1.524 m), weight 122 lb 1.6 oz (55.4 kg), SpO2 93 %.     HEENT: AT, NC, PERRLA  Neck: No JVD  Heart: S1 S2 audible, no murmur   Lungs: CTA   Abdomen: Nontender   Limbs: No edema   CNS: no focal deficit      Past Medical History:   Diagnosis Date    Acute renal failure ([facility] [address] 4/17/2017    CAD (coronary artery disease)     Chronic kidney disease     Hyperlipidemia     Hypertension     TIA (transient ischemic attack)         Patient Active Problem List   Diagnosis    Proteinuria    HTN (hypertension)    OAB (overactive bladder)    Hyperlipidemia    PAD (peripheral artery disease) (HCC)    Acute renal failure (HCC)    Chronic kidney disease (CKD) stage G2/A3, mildly decreased glomerular filtration rate (GFR) between 60-89 mL/min/1.73 square meter and albuminuria creatinine ratio greater than 300 mg/g    Chronic kidney disease (CKD) stage G1/A3, glomerular filtration rate (GFR) equal to or greater than 90 mL/min/1.73 square meter and albuminuria creatinine ratio greater than 300 mg/g    Flash pulmonary edema (HCC)        Allergies   Allergen Reactions    Sulfa Antibiotics Hives injection 30 mg  30 mg Subcutaneous Daily Araseli Lemus MD   30 mg at 11/07/18 0932    magnesium hydroxide (MILK OF MAGNESIA) 400 MG/5ML suspension 30 mL  30 mL Oral Daily PRN Araseli Lemus MD        ipratropium-albuterol (DUONEB) nebulizer solution 1 ampule  1 ampule Inhalation Q4H WA Araseli Lemus MD   1 ampule at 11/07/18 0839    carvedilol (COREG) tablet 6.25 mg  6.25 mg Oral BID WC Araseli Lemus MD   6.25 mg at 11/07/18 0933    sodium chloride flush 0.9 % injection 10 mL  10 mL Intravenous BID Reji Anderson DO   10 mL at 11/06/18 9109           Labs:  CBC with Differential:    Lab Results   Component Value Date    WBC 18.0 11/06/2018    RBC 3.30 11/06/2018    HGB 10.4 11/06/2018    HCT 31.6 11/06/2018     11/06/2018    MCV 95.8 11/06/2018    MCH 31.5 11/06/2018    MCHC 32.9 11/06/2018    RDW 13.6 11/06/2018    SEGSPCT 82.7 11/04/2018    LYMPHOPCT 8.8 11/04/2018    MONOPCT 5.9 11/04/2018    EOSPCT 3.6 08/29/2011    BASOPCT 0.5 11/04/2018    MONOSABS 0.8 11/04/2018    LYMPHSABS 1.1 11/04/2018    EOSABS 0.2 11/04/2018    BASOSABS 0.1 11/04/2018    DIFFTYPE AUTOMATED DIFFERENTIAL 11/04/2018     CMP:    Lab Results   Component Value Date     11/07/2018    K 4.4 11/07/2018     11/07/2018    CO2 25 11/07/2018    BUN 42 11/07/2018    CREATININE 1.7 11/07/2018    GFRAA 35 11/07/2018    LABGLOM 29 11/07/2018    GLUCOSE 93 11/07/2018    PROT 5.6 11/07/2018    PROT 6.3 11/10/2012    LABALBU 3.5 11/07/2018    LABALBU 100 03/17/2017    CALCIUM 8.1 11/07/2018    BILITOT 0.3 11/07/2018    ALKPHOS 67 11/07/2018    AST 23 11/07/2018    ALT 17 11/07/2018     Hepatic Function Panel:    Lab Results   Component Value Date    ALKPHOS 67 11/07/2018    ALT 17 11/07/2018    AST 23 11/07/2018    PROT 5.6 11/07/2018    PROT 6.3 11/10/2012    BILITOT 0.3 11/07/2018    BILIDIR 0.2 05/08/2018    IBILI 0.1 05/08/2018    LABALBU 3.5 11/07/2018    LABALBU 100 03/17/2017     Magnesium:    Lab Results

## 2018-11-08 ENCOUNTER — APPOINTMENT (OUTPATIENT)
Dept: GENERAL RADIOLOGY | Age: 83
DRG: 291 | End: 2018-11-08
Payer: MEDICARE

## 2018-11-08 VITALS
SYSTOLIC BLOOD PRESSURE: 99 MMHG | HEART RATE: 75 BPM | WEIGHT: 119.3 LBS | TEMPERATURE: 98.2 F | HEIGHT: 60 IN | OXYGEN SATURATION: 92 % | RESPIRATION RATE: 17 BRPM | BODY MASS INDEX: 23.42 KG/M2 | DIASTOLIC BLOOD PRESSURE: 49 MMHG

## 2018-11-08 PROBLEM — J96.01 ACUTE RESPIRATORY FAILURE WITH HYPOXIA (HCC): Status: ACTIVE | Noted: 2018-11-08

## 2018-11-08 LAB
ANION GAP SERPL CALCULATED.3IONS-SCNC: 12 MMOL/L (ref 4–16)
BASE EXCESS MIXED: 3.7 (ref 0–2.3)
BUN BLDV-MCNC: 34 MG/DL (ref 6–23)
CALCIUM SERPL-MCNC: 8.6 MG/DL (ref 8.3–10.6)
CARBON MONOXIDE, BLOOD: 1.7 % (ref 0–5)
CHLORIDE BLD-SCNC: 105 MMOL/L (ref 99–110)
CO2 CONTENT: 28.9 MMOL/L (ref 19–24)
CO2: 26 MMOL/L (ref 21–32)
CREAT SERPL-MCNC: 1.3 MG/DL (ref 0.6–1.1)
GFR AFRICAN AMERICAN: 48 ML/MIN/1.73M2
GFR NON-AFRICAN AMERICAN: 39 ML/MIN/1.73M2
GLUCOSE BLD-MCNC: 101 MG/DL (ref 70–99)
HCO3 ARTERIAL: 27.7 MMOL/L (ref 18–23)
METHEMOGLOBIN ARTERIAL: 1.1 %
O2 SATURATION: 91.2 % (ref 96–97)
PCO2 ARTERIAL: 39 MMHG (ref 32–45)
PH BLOOD: 7.46 (ref 7.34–7.45)
PO2 ARTERIAL: 62 MMHG (ref 75–100)
POTASSIUM SERPL-SCNC: 3.8 MMOL/L (ref 3.5–5.1)
SODIUM BLD-SCNC: 143 MMOL/L (ref 135–145)

## 2018-11-08 PROCEDURE — 94680 O2 UPTK RST&XERS DIR SIMPLE: CPT

## 2018-11-08 PROCEDURE — 82803 BLOOD GASES ANY COMBINATION: CPT

## 2018-11-08 PROCEDURE — 6370000000 HC RX 637 (ALT 250 FOR IP): Performed by: INTERNAL MEDICINE

## 2018-11-08 PROCEDURE — 36415 COLL VENOUS BLD VENIPUNCTURE: CPT

## 2018-11-08 PROCEDURE — 6360000002 HC RX W HCPCS: Performed by: INTERNAL MEDICINE

## 2018-11-08 PROCEDURE — 94761 N-INVAS EAR/PLS OXIMETRY MLT: CPT

## 2018-11-08 PROCEDURE — 2580000003 HC RX 258: Performed by: EMERGENCY MEDICINE

## 2018-11-08 PROCEDURE — C9113 INJ PANTOPRAZOLE SODIUM, VIA: HCPCS | Performed by: INTERNAL MEDICINE

## 2018-11-08 PROCEDURE — 71046 X-RAY EXAM CHEST 2 VIEWS: CPT

## 2018-11-08 PROCEDURE — 36600 WITHDRAWAL OF ARTERIAL BLOOD: CPT

## 2018-11-08 PROCEDURE — 2580000003 HC RX 258: Performed by: INTERNAL MEDICINE

## 2018-11-08 PROCEDURE — 94640 AIRWAY INHALATION TREATMENT: CPT

## 2018-11-08 PROCEDURE — 2700000000 HC OXYGEN THERAPY PER DAY

## 2018-11-08 PROCEDURE — 80048 BASIC METABOLIC PNL TOTAL CA: CPT

## 2018-11-08 RX ORDER — CARVEDILOL 3.12 MG/1
3.12 TABLET ORAL 2 TIMES DAILY WITH MEALS
Qty: 60 TABLET | Refills: 0 | Status: ON HOLD | OUTPATIENT
Start: 2018-11-09 | End: 2020-05-12 | Stop reason: HOSPADM

## 2018-11-08 RX ORDER — PRAVASTATIN SODIUM 10 MG
10 TABLET ORAL DAILY
Qty: 30 TABLET | Refills: 3 | Status: SHIPPED | OUTPATIENT
Start: 2018-11-08 | End: 2018-11-08

## 2018-11-08 RX ORDER — ALBUTEROL SULFATE 90 UG/1
2 AEROSOL, METERED RESPIRATORY (INHALATION) EVERY 6 HOURS PRN
Qty: 1 INHALER | Refills: 0 | Status: SHIPPED | OUTPATIENT
Start: 2018-11-08

## 2018-11-08 RX ORDER — LOSARTAN POTASSIUM AND HYDROCHLOROTHIAZIDE 12.5; 5 MG/1; MG/1
1 TABLET ORAL DAILY
Qty: 30 TABLET | Refills: 0 | Status: SHIPPED | OUTPATIENT
Start: 2018-11-09 | End: 2019-01-11

## 2018-11-08 RX ORDER — ACETAMINOPHEN 80 MG
TABLET,CHEWABLE ORAL
Status: COMPLETED
Start: 2018-11-08 | End: 2018-11-08

## 2018-11-08 RX ORDER — TORSEMIDE 20 MG/1
10 TABLET ORAL DAILY
Status: DISCONTINUED | OUTPATIENT
Start: 2018-11-08 | End: 2018-11-08 | Stop reason: HOSPADM

## 2018-11-08 RX ORDER — TORSEMIDE 10 MG/1
10 TABLET ORAL DAILY
Qty: 30 TABLET | Refills: 0 | Status: SHIPPED | OUTPATIENT
Start: 2018-11-08 | End: 2019-02-01 | Stop reason: DRUGHIGH

## 2018-11-08 RX ORDER — PRAVASTATIN SODIUM 40 MG
40 TABLET ORAL DAILY
Qty: 30 TABLET | Refills: 0 | Status: SHIPPED | OUTPATIENT
Start: 2018-11-08

## 2018-11-08 RX ADMIN — IPRATROPIUM BROMIDE AND ALBUTEROL SULFATE 1 AMPULE: .5; 3 SOLUTION RESPIRATORY (INHALATION) at 11:40

## 2018-11-08 RX ADMIN — IPRATROPIUM BROMIDE AND ALBUTEROL SULFATE 1 AMPULE: .5; 3 SOLUTION RESPIRATORY (INHALATION) at 08:22

## 2018-11-08 RX ADMIN — SODIUM CHLORIDE, PRESERVATIVE FREE 10 ML: 5 INJECTION INTRAVENOUS at 08:57

## 2018-11-08 RX ADMIN — ACETAMINOPHEN 650 MG: 325 TABLET, FILM COATED ORAL at 05:44

## 2018-11-08 RX ADMIN — CLOPIDOGREL BISULFATE 75 MG: 75 TABLET ORAL at 08:49

## 2018-11-08 RX ADMIN — LOSARTAN POTASSIUM AND HYDROCHLOROTHIAZIDE 1 TABLET: 12.5; 5 TABLET ORAL at 08:49

## 2018-11-08 RX ADMIN — PANTOPRAZOLE SODIUM 40 MG: 40 INJECTION, POWDER, FOR SOLUTION INTRAVENOUS at 09:09

## 2018-11-08 RX ADMIN — IPRATROPIUM BROMIDE AND ALBUTEROL SULFATE 1 AMPULE: .5; 3 SOLUTION RESPIRATORY (INHALATION) at 15:43

## 2018-11-08 RX ADMIN — CARVEDILOL 6.25 MG: 6.25 TABLET, FILM COATED ORAL at 08:50

## 2018-11-08 RX ADMIN — OYSTER SHELL CALCIUM WITH VITAMIN D 1 TABLET: 500; 200 TABLET, FILM COATED ORAL at 08:51

## 2018-11-08 RX ADMIN — TORSEMIDE 10 MG: 20 TABLET ORAL at 09:09

## 2018-11-08 RX ADMIN — MULTIPLE VITAMINS W/ MINERALS TAB 1 TABLET: TAB at 08:50

## 2018-11-08 RX ADMIN — CILOSTAZOL 100 MG: 100 TABLET ORAL at 08:51

## 2018-11-08 RX ADMIN — ENOXAPARIN SODIUM 30 MG: 30 INJECTION SUBCUTANEOUS at 08:51

## 2018-11-08 RX ADMIN — Medication: at 16:01

## 2018-11-08 RX ADMIN — SODIUM CHLORIDE, PRESERVATIVE FREE 10 ML: 5 INJECTION INTRAVENOUS at 08:52

## 2018-11-08 RX ADMIN — ACETAMINOPHEN 650 MG: 325 TABLET, FILM COATED ORAL at 14:12

## 2018-11-08 ASSESSMENT — PAIN SCALES - GENERAL
PAINLEVEL_OUTOF10: 0
PAINLEVEL_OUTOF10: 2
PAINLEVEL_OUTOF10: 0
PAINLEVEL_OUTOF10: 0

## 2018-11-08 ASSESSMENT — PAIN DESCRIPTION - PAIN TYPE: TYPE: ACUTE PAIN

## 2018-11-08 ASSESSMENT — PAIN DESCRIPTION - DESCRIPTORS: DESCRIPTORS: ACHING;DISCOMFORT

## 2018-11-08 ASSESSMENT — PAIN DESCRIPTION - LOCATION: LOCATION: BACK

## 2018-11-08 NOTE — PROGRESS NOTES
Nephrology Progress Note  11/8/2018 9:00 AM        Subjective:   Admit Date: 11/4/2018  PCP: Sonya Gardner DO    Interval History: sob better     Diet: reasonable     ROS:  No overt orthopnea , ambulates , tolerated losartan / thiazide     Data:     Current med's:    torsemide  10 mg Oral Daily    losartan-hydrochlorothiazide  1 tablet Oral Daily    cilostazol  100 mg Oral Daily    clopidogrel  75 mg Oral Daily    acetaminophen  650 mg Oral 3 times per day    calcium-vitamin D  1 tablet Oral Daily    therapeutic multivitamin-minerals  1 tablet Oral Daily    oxybutynin  5 mg Oral Nightly    pravastatin  10 mg Oral Daily    aspirin  81 mg Oral Daily    pantoprazole  40 mg Intravenous Daily    sodium chloride flush  10 mL Intravenous 2 times per day    enoxaparin  30 mg Subcutaneous Daily    ipratropium-albuterol  1 ampule Inhalation Q4H WA    carvedilol  6.25 mg Oral BID WC    sodium chloride flush  10 mL Intravenous BID           I/O last 3 completed shifts: In: 360 [P.O.:360]  Out: 2900 [Urine:2900]    CBC:   Recent Labs      11/06/18   0406   WBC  18.0*   HGB  10.4*   PLT  308          Recent Labs      11/06/18   0406  11/07/18   0326  11/08/18   0627   NA  137  139  143   K  4.5  4.4  3.8   CL  98*  104  105   CO2  25  25  26   BUN  38*  42*  34*   CREATININE  2.4*  1.7*  1.3*   GLUCOSE  138*  93  101*       Lab Results   Component Value Date    CALCIUM 8.6 11/08/2018    PHOS 3.6 11/07/2018       Objective:     Vitals: BP (!) 142/62   Pulse 71   Temp 98.4 °F (36.9 °C) (Oral)   Resp 24   Ht 5' (1.524 m)   Wt 119 lb 4.8 oz (54.1 kg)   SpO2 97%   BMI 23.30 kg/m²     General appearance:  No acute distress  HEENT:  +pallor  Neck:  supple  Lungs:  Few exp wheeze  Heart:  Seems RRR with CEDRIC  Abdomen: soft  Extremities:  Trace edema       Problem List :         Impression :     1. CASS- mainly from ATI- recovering  2. 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  PAD/HTN/valvular dz

## 2018-11-08 NOTE — CONSULTS
[facility] [phone]
which made sense to me and I opted to  schedule actually for today, so she can enjoy her birthday, which she  did. Her creatinine remained 0.8, she did not have any other active  sediment in the urine before and all serology except phospholipase A2  receptor antibody, which for some reason I do not see the result,  although that was ordered, was negative. Of course, her positive was  the high kappa light chain with increased ratio at more than 1.8 or so  suggestive of plasma cell dyscrasia, even though _____ was negative. PAST MEDICAL HISTORY:  1.  CKD stage IA3 as mentioned earlier, mainly for non-albuminuric  proteinuria. 2.  Proteinuria with high kappa light chain suggestive of _____  dyscrasia. 3.  Hypertension since . 4.  Overactive bladder since . 5.  Smoking. 6.  Gastroesophageal reflux disorder. 7.  Diffuse and severe atherosclerotic disease involving multiple  vascular beds. She did have aortofemoral bypass in the lower extremity,  looks like in .  8.  Brain aneurysm, which she had a coil at [facility] in . PAST SURGICAL HISTORY:  1. Aortofemoral bypass of lower extremities in .  2.  Brain aneurysm and coiling. 3.  Gallbladder surgery in . 4.  Hemorrhoid surgery in  and cataract surgery in . HABITS:  She smokes less than a pack per day now. She is trying to quit  it as I am advising her. She does have 50-pack-year history. No  history of alcohol or illicit drug abuse. OBSTETRICAL AND GYNECOLOGICAL HISTORY:   3, para 3. No history  of eclampsia, preeclampsia, gestational diabetes or hypertension. FAMILY MEDICAL HISTORY:  Positive for CVA and coronary artery disease. SOCIAL HISTORY:  The patient is  x2. Last divorce in . She  is living alone in a mobile home. She is pretty active, lives in [facility]. She does have children, they are very close to her and  has a very good family support.     HOME MEDICATIONS:  Included

## 2018-11-08 NOTE — DISCHARGE SUMMARY
arch  anatomy. There is severe stenosis of the origins of the innominate and right  subclavian arteries and moderate stenosis of the proximal left subclavian  artery. CAROTID ARTERIES: The right common carotid artery is normal in caliber. There is mild atherosclerotic plaque at the right carotid bifurcation and  bulb causing less than 25% stenosis of the proximal right internal carotid  artery. The cervical right internal carotid artery is otherwise normal in  caliber. There is severe stenosis at the origin of the right external  carotid artery. There is severe calcific atherosclerotic stenosis of the  proximal left common carotid artery and left common carotid artery terminus. There is mild calcific atherosclerotic plaque involving the left carotid bulb  causing less than 25% stenosis of the proximal left internal carotid artery. The left external carotid artery is moderately stenotic at its origin. VERTEBRAL ARTERIES: There is diffuse severe stenosis of the right vertebral  artery. The left vertebral artery is normal in caliber. SOFT TISSUES: The lung apices are clear. No cervical or superior mediastinal  lymphadenopathy. The visualized portion of the larynx and pharynx appear  unremarkable. The parotid, submandibular and thyroid glands demonstrate no  acute abnormality. BONES: The visualized osseous structures appear unremarkable. CTA HEAD:    ANTERIOR CIRCULATION: Mild calcification of the internal carotid arteries  without stenosis. Anterior and middle cerebral arteries normal in caliber. Left parasellar aneurysm coil pack noted. No residual aneurysm filling  identified. POSTERIOR CIRCULATION: The posterior cerebral arteries demonstrate no focal  stenosis. The vertebral and basilar arteries appear unremarkable. Bilateral  posterior communicating arteries are present. No posterior circulation  aneurysm is seen. Impression:       1. No acute intracranial abnormality.   2. left external carotid artery is moderately stenotic at its origin. VERTEBRAL ARTERIES: There is diffuse severe stenosis of the right vertebral  artery. The left vertebral artery is normal in caliber. SOFT TISSUES: The lung apices are clear. No cervical or superior mediastinal  lymphadenopathy. The visualized portion of the larynx and pharynx appear  unremarkable. The parotid, submandibular and thyroid glands demonstrate no  acute abnormality. BONES: The visualized osseous structures appear unremarkable. CTA HEAD:    ANTERIOR CIRCULATION: Mild calcification of the internal carotid arteries  without stenosis. Anterior and middle cerebral arteries normal in caliber. Left parasellar aneurysm coil pack noted. No residual aneurysm filling  identified. POSTERIOR CIRCULATION: The posterior cerebral arteries demonstrate no focal  stenosis. The vertebral and basilar arteries appear unremarkable. Bilateral  posterior communicating arteries are present. No posterior circulation  aneurysm is seen. Impression:       1. No acute intracranial abnormality. 2. No acute arterial abnormality in the head or neck. 3. Mild chronic white matter microvascular ischemic changes. 4. Prior left parasellar aneurysm coiling. No evidence of residual aneurysm  filling. 5. Severe atherosclerotic stenosis of the proximal arch vessels and distal  left common carotid artery as detailed above. 6. Severe diffuse stenosis of the right vertebral artery. 7. No left vertebral artery stenosis. 8. Mild stenosis of the proximal internal carotid arteries measuring less  than 25% per NASCET criteria.        XR CHEST PORTABLE [[id number]] Collected: 11/04/18 2202     Order Status: Completed Updated: 11/04/18 2206     Narrative:       EXAMINATION:  SINGLE XRAY VIEW OF THE CHEST    11/4/2018 9:31 pm    COMPARISON:  03/17/2017    HISTORY:  ORDERING SYSTEM PROVIDED HISTORY: n/v  TECHNOLOGIST PROVIDED HISTORY:  Reason for exam:->n/v  Ordering Physician Provided Reason for Exam: sob  Acuity: Acute  Type of Exam: Initial    FINDINGS:  The lungs are clear. The cardiac and mediastinal contours are normal.  There  is no pleural effusion or pneumothorax. No acute osseous abnormality is  identified. Impression:       No acute cardiopulmonary abnormality.            Discharge Time of 35 minutes    Electronically signed by Cally Gonsalez MD on 11/8/2018 at 5:42 PM

## 2018-11-09 LAB
EKG ATRIAL RATE: 86 BPM
EKG DIAGNOSIS: NORMAL
EKG P AXIS: 71 DEGREES
EKG P-R INTERVAL: 150 MS
EKG Q-T INTERVAL: 392 MS
EKG QRS DURATION: 82 MS
EKG QTC CALCULATION (BAZETT): 469 MS
EKG R AXIS: 68 DEGREES
EKG T AXIS: 73 DEGREES
EKG VENTRICULAR RATE: 86 BPM

## 2018-11-09 NOTE — CONSULTS
[address], [address] [place] [address]                                  CONSULTATION    PATIENT NAME: Micky Curiel                    :        1935  MED REC NO:   [id number]                          ROOM:  ACCOUNT NO:   [de-identified]                           ADMIT DATE: 2018  PROVIDER:     Moustapha Guzman MD    CONSULT DATE:  2018    HISTORY OF PRESENT ILLNESS:  The patient is an 82-year-old lady with  multiple medical problems including hypertension, hyperlipidemia,  chronic kidney disease, coronary artery disease who was admitted through  the emergency room with complaints of increasing shortness of breath and  nonproductive cough. She was found to have elevated blood pressure and  went into flash pulmonary edema. While in the hospital, the patient  improved on Lasix. She was initially placed on the BiPAP. The patient  is feeling much better. She has occasional cough. She denies any fever  or chills. She denies any nausea or vomiting. She denies any chest  pains. She does complain of excessive daytime somnolence. She lives by  herself and does not know about snoring. PAST MEDICAL HISTORY:  Significant for hypertension, hyperlipidemia,  chronic kidney disease, coronary artery disease. PAST SURGICAL HISTORY:  Remarkable for cholecystectomy, cardiac  catheterization and brain aneurysm surgery. FAMILY HISTORY:  Reveals that her mother had hypertension, heart  disease. SOCIAL HISTORY:  Reveals that she smokes about one pack per day and has  been smoking for 65 years. No history of alcohol or drug abuse. MEDICATIONS:  Reviewed. ALLERGIES:  She is allergic to SULFA ANTIBIOTICS. REVIEW OF SYSTEMS:  A 10-point review of systems were reviewed and are  negative except for what is mentioned in the history of present illness.     PHYSICAL EXAMINATION:  GENERAL:  The patient is alert, oriented x3, in

## 2018-11-12 ENCOUNTER — HOSPITAL ENCOUNTER (OUTPATIENT)
Age: 83
Discharge: HOME | End: 2018-11-12
Payer: MEDICARE

## 2018-11-12 LAB
ANION GAP SERPL CALCULATED.3IONS-SCNC: 15 MMOL/L (ref 4–16)
BACTERIA: ABNORMAL /HPF
BILIRUBIN URINE: NEGATIVE MG/DL
BLOOD, URINE: ABNORMAL
BUN BLDV-MCNC: 33 MG/DL (ref 6–23)
CALCIUM SERPL-MCNC: 9.6 MG/DL (ref 8.3–10.6)
CHLORIDE BLD-SCNC: 91 MMOL/L (ref 99–110)
CLARITY: CLEAR
CO2: 30 MMOL/L (ref 21–32)
COLOR: ABNORMAL
CREAT SERPL-MCNC: 1.7 MG/DL (ref 0.6–1.1)
GFR AFRICAN AMERICAN: 35 ML/MIN/1.73M2
GFR NON-AFRICAN AMERICAN: 29 ML/MIN/1.73M2
GLUCOSE BLD-MCNC: 99 MG/DL (ref 70–99)
GLUCOSE, URINE: NEGATIVE MG/DL
HYALINE CASTS: 5 /LPF
KETONES, URINE: NEGATIVE MG/DL
LEUKOCYTE ESTERASE, URINE: NEGATIVE
MAGNESIUM: 1.8 MG/DL (ref 1.8–2.4)
NITRITE URINE, QUANTITATIVE: NEGATIVE
PH, URINE: 6 (ref 5–8)
POTASSIUM SERPL-SCNC: 3.5 MMOL/L (ref 3.5–5.1)
PROTEIN UA: 100 MG/DL
RBC URINE: 701 /HPF (ref 0–6)
SODIUM BLD-SCNC: 136 MMOL/L (ref 135–145)
SPECIFIC GRAVITY UA: 1 (ref 1–1.03)
SQUAMOUS EPITHELIAL: 1 /HPF
TRICHOMONAS: ABNORMAL /HPF
UROBILINOGEN, URINE: NORMAL MG/DL (ref 0.2–1)
WBC UA: 9 /HPF (ref 0–5)

## 2018-11-12 PROCEDURE — 80048 BASIC METABOLIC PNL TOTAL CA: CPT

## 2018-11-12 PROCEDURE — 36415 COLL VENOUS BLD VENIPUNCTURE: CPT

## 2018-11-12 PROCEDURE — 83735 ASSAY OF MAGNESIUM: CPT

## 2018-11-12 PROCEDURE — 81001 URINALYSIS AUTO W/SCOPE: CPT

## 2018-11-26 PROBLEM — N17.9 ACUTE KIDNEY FAILURE (HCC): Status: ACTIVE | Noted: 2018-11-26

## 2018-11-26 PROBLEM — I38 VALVULAR DISEASE: Status: ACTIVE | Noted: 2018-11-26

## 2018-12-03 ENCOUNTER — HOSPITAL ENCOUNTER (OUTPATIENT)
Dept: GENERAL RADIOLOGY | Age: 83
Discharge: HOME | End: 2018-12-03
Payer: MEDICARE

## 2018-12-03 ENCOUNTER — HOSPITAL ENCOUNTER (OUTPATIENT)
Age: 83
Discharge: HOME | End: 2018-12-03
Payer: MEDICARE

## 2018-12-03 DIAGNOSIS — J90 PLEURAL EFFUSION: ICD-10-CM

## 2018-12-03 DIAGNOSIS — J98.11 ATELECTASIS: ICD-10-CM

## 2018-12-03 PROCEDURE — 71046 X-RAY EXAM CHEST 2 VIEWS: CPT

## 2019-01-11 ENCOUNTER — OFFICE VISIT (OUTPATIENT)
Dept: FAMILY MEDICINE CLINIC | Age: 84
End: 2019-01-11
Payer: MEDICARE

## 2019-01-11 VITALS
TEMPERATURE: 98.4 F | HEIGHT: 60 IN | DIASTOLIC BLOOD PRESSURE: 52 MMHG | SYSTOLIC BLOOD PRESSURE: 136 MMHG | HEART RATE: 72 BPM | OXYGEN SATURATION: 99 % | WEIGHT: 117.6 LBS | BODY MASS INDEX: 23.09 KG/M2

## 2019-01-11 DIAGNOSIS — M54.40 LOW BACK PAIN WITH SCIATICA, SCIATICA LATERALITY UNSPECIFIED, UNSPECIFIED BACK PAIN LATERALITY, UNSPECIFIED CHRONICITY: ICD-10-CM

## 2019-01-11 DIAGNOSIS — R20.0 NUMBNESS AND TINGLING OF BOTH LEGS BELOW KNEES: Primary | ICD-10-CM

## 2019-01-11 DIAGNOSIS — M25.472 ANKLE SWELLING, LEFT: ICD-10-CM

## 2019-01-11 DIAGNOSIS — R20.2 NUMBNESS AND TINGLING OF BOTH LEGS BELOW KNEES: Primary | ICD-10-CM

## 2019-01-11 PROCEDURE — 99213 OFFICE O/P EST LOW 20 MIN: CPT | Performed by: NURSE PRACTITIONER

## 2019-01-11 PROCEDURE — G8420 CALC BMI NORM PARAMETERS: HCPCS | Performed by: NURSE PRACTITIONER

## 2019-01-11 PROCEDURE — G8399 PT W/DXA RESULTS DOCUMENT: HCPCS | Performed by: NURSE PRACTITIONER

## 2019-01-11 PROCEDURE — 1036F TOBACCO NON-USER: CPT | Performed by: NURSE PRACTITIONER

## 2019-01-11 PROCEDURE — 1123F ACP DISCUSS/DSCN MKR DOCD: CPT | Performed by: NURSE PRACTITIONER

## 2019-01-11 PROCEDURE — G8427 DOCREV CUR MEDS BY ELIG CLIN: HCPCS | Performed by: NURSE PRACTITIONER

## 2019-01-11 PROCEDURE — 1101F PT FALLS ASSESS-DOCD LE1/YR: CPT | Performed by: NURSE PRACTITIONER

## 2019-01-11 PROCEDURE — 1090F PRES/ABSN URINE INCON ASSESS: CPT | Performed by: NURSE PRACTITIONER

## 2019-01-11 PROCEDURE — G8484 FLU IMMUNIZE NO ADMIN: HCPCS | Performed by: NURSE PRACTITIONER

## 2019-01-11 PROCEDURE — 4040F PNEUMOC VAC/ADMIN/RCVD: CPT | Performed by: NURSE PRACTITIONER

## 2019-01-11 RX ORDER — LOSARTAN POTASSIUM AND HYDROCHLOROTHIAZIDE 12.5; 5 MG/1; MG/1
1 TABLET ORAL DAILY
Status: ON HOLD | COMMUNITY
End: 2019-03-11 | Stop reason: HOSPADM

## 2019-01-11 ASSESSMENT — PATIENT HEALTH QUESTIONNAIRE - PHQ9
SUM OF ALL RESPONSES TO PHQ9 QUESTIONS 1 & 2: 0
2. FEELING DOWN, DEPRESSED OR HOPELESS: 0
1. LITTLE INTEREST OR PLEASURE IN DOING THINGS: 0
SUM OF ALL RESPONSES TO PHQ QUESTIONS 1-9: 0
SUM OF ALL RESPONSES TO PHQ QUESTIONS 1-9: 0

## 2019-01-11 ASSESSMENT — ENCOUNTER SYMPTOMS: BACK PAIN: 1

## 2019-01-24 ENCOUNTER — OFFICE VISIT (OUTPATIENT)
Dept: INTERNAL MEDICINE CLINIC | Age: 84
End: 2019-01-24
Payer: MEDICARE

## 2019-01-24 ENCOUNTER — TELEPHONE (OUTPATIENT)
Dept: FAMILY MEDICINE CLINIC | Age: 84
End: 2019-01-24

## 2019-01-24 VITALS
OXYGEN SATURATION: 99 % | WEIGHT: 118 LBS | SYSTOLIC BLOOD PRESSURE: 112 MMHG | HEART RATE: 63 BPM | RESPIRATION RATE: 14 BRPM | BODY MASS INDEX: 23.05 KG/M2 | DIASTOLIC BLOOD PRESSURE: 50 MMHG

## 2019-01-24 DIAGNOSIS — R20.2 PARESTHESIA OF BOTH LOWER EXTREMITIES: Primary | ICD-10-CM

## 2019-01-24 DIAGNOSIS — R20.2 NUMBNESS AND TINGLING OF BOTH LEGS BELOW KNEES: Primary | ICD-10-CM

## 2019-01-24 DIAGNOSIS — Z91.81 AT HIGH RISK FOR FALLS: ICD-10-CM

## 2019-01-24 DIAGNOSIS — D64.9 ANEMIA, UNSPECIFIED TYPE: ICD-10-CM

## 2019-01-24 DIAGNOSIS — R94.6 ABNORMAL THYROID FUNCTION TEST: ICD-10-CM

## 2019-01-24 DIAGNOSIS — R20.0 NUMBNESS AND TINGLING OF BOTH LEGS BELOW KNEES: Primary | ICD-10-CM

## 2019-01-24 PROCEDURE — 99213 OFFICE O/P EST LOW 20 MIN: CPT | Performed by: FAMILY MEDICINE

## 2019-01-24 NOTE — TELEPHONE ENCOUNTER
Patient has an order for an EMG to be completed, but it needs to be made directly to Dr. Maddie Evans so it would fall into their work que. Can this order be changed please?

## 2019-01-25 ENCOUNTER — OFFICE VISIT (OUTPATIENT)
Dept: PHYSICAL MEDICINE AND REHAB | Age: 84
End: 2019-01-25
Payer: MEDICARE

## 2019-01-25 ENCOUNTER — HOSPITAL ENCOUNTER (OUTPATIENT)
Age: 84
Discharge: HOME | End: 2019-01-25
Payer: MEDICARE

## 2019-01-25 DIAGNOSIS — R20.2 PARESTHESIA OF BOTH LOWER EXTREMITIES: ICD-10-CM

## 2019-01-25 DIAGNOSIS — M79.605 PAIN IN BOTH LOWER EXTREMITIES: ICD-10-CM

## 2019-01-25 DIAGNOSIS — R20.2 PARESTHESIA OF BOTH FEET: ICD-10-CM

## 2019-01-25 DIAGNOSIS — D64.9 ANEMIA, UNSPECIFIED TYPE: ICD-10-CM

## 2019-01-25 DIAGNOSIS — G57.32 PERONEAL NEUROPATHY AT KNEE, LEFT: ICD-10-CM

## 2019-01-25 DIAGNOSIS — M79.604 PAIN IN BOTH LOWER EXTREMITIES: ICD-10-CM

## 2019-01-25 DIAGNOSIS — R94.6 ABNORMAL THYROID FUNCTION TEST: ICD-10-CM

## 2019-01-25 DIAGNOSIS — G62.81 CRITICAL ILLNESS POLYNEUROPATHY (HCC): ICD-10-CM

## 2019-01-25 DIAGNOSIS — G60.8 POLYNEUROPATHY, PERIPHERAL SENSORIMOTOR AXONAL: Primary | ICD-10-CM

## 2019-01-25 LAB
ALBUMIN SERPL-MCNC: 4.2 GM/DL (ref 3.4–5)
ALP BLD-CCNC: 65 IU/L (ref 40–129)
ALT SERPL-CCNC: 6 U/L (ref 10–40)
ANION GAP SERPL CALCULATED.3IONS-SCNC: 14 MMOL/L (ref 4–16)
AST SERPL-CCNC: 13 IU/L (ref 15–37)
BASOPHILS ABSOLUTE: 0.1 K/CU MM
BASOPHILS RELATIVE PERCENT: 0.7 % (ref 0–1)
BILIRUB SERPL-MCNC: 0.2 MG/DL (ref 0–1)
BUN BLDV-MCNC: 54 MG/DL (ref 6–23)
CALCIUM SERPL-MCNC: 9.2 MG/DL (ref 8.3–10.6)
CHLORIDE BLD-SCNC: 99 MMOL/L (ref 99–110)
CO2: 28 MMOL/L (ref 21–32)
CREAT SERPL-MCNC: 2.1 MG/DL (ref 0.6–1.1)
DIFFERENTIAL TYPE: ABNORMAL
EOSINOPHILS ABSOLUTE: 0.3 K/CU MM
EOSINOPHILS RELATIVE PERCENT: 4.9 % (ref 0–3)
FOLATE: >20 NG/ML (ref 3.1–17.5)
GFR AFRICAN AMERICAN: 27 ML/MIN/1.73M2
GFR NON-AFRICAN AMERICAN: 23 ML/MIN/1.73M2
GLUCOSE BLD-MCNC: 77 MG/DL (ref 70–99)
HCT VFR BLD CALC: 25.6 % (ref 37–47)
HEMOGLOBIN: 8 GM/DL (ref 12.5–16)
IMMATURE NEUTROPHIL %: 0.1 % (ref 0–0.43)
LYMPHOCYTES ABSOLUTE: 2.6 K/CU MM
LYMPHOCYTES RELATIVE PERCENT: 37.8 % (ref 24–44)
MCH RBC QN AUTO: 30.1 PG (ref 27–31)
MCHC RBC AUTO-ENTMCNC: 31.3 % (ref 32–36)
MCV RBC AUTO: 96.2 FL (ref 78–100)
MONOCYTES ABSOLUTE: 0.8 K/CU MM
MONOCYTES RELATIVE PERCENT: 12.1 % (ref 0–4)
NUCLEATED RBC %: 0 %
PDW BLD-RTO: 13.2 % (ref 11.7–14.9)
PLATELET # BLD: 362 K/CU MM (ref 140–440)
PMV BLD AUTO: 10.3 FL (ref 7.5–11.1)
POTASSIUM SERPL-SCNC: 3.8 MMOL/L (ref 3.5–5.1)
RBC # BLD: 2.66 M/CU MM (ref 4.2–5.4)
SEGMENTED NEUTROPHILS ABSOLUTE COUNT: 3.1 K/CU MM
SEGMENTED NEUTROPHILS RELATIVE PERCENT: 44.4 % (ref 36–66)
SODIUM BLD-SCNC: 141 MMOL/L (ref 135–145)
T4 FREE: 1.12 NG/DL (ref 0.9–1.8)
TOTAL IMMATURE NEUTOROPHIL: 0.01 K/CU MM
TOTAL NUCLEATED RBC: 0 K/CU MM
TOTAL PROTEIN: 6.4 GM/DL (ref 6.4–8.2)
TSH HIGH SENSITIVITY: 9.32 UIU/ML (ref 0.27–4.2)
VITAMIN B-12: 605.1 PG/ML (ref 211–911)
WBC # BLD: 7 K/CU MM (ref 4–10.5)

## 2019-01-25 PROCEDURE — 80053 COMPREHEN METABOLIC PANEL: CPT

## 2019-01-25 PROCEDURE — 84439 ASSAY OF FREE THYROXINE: CPT

## 2019-01-25 PROCEDURE — 95911 NRV CNDJ TEST 9-10 STUDIES: CPT | Performed by: PHYSICAL MEDICINE & REHABILITATION

## 2019-01-25 PROCEDURE — 36415 COLL VENOUS BLD VENIPUNCTURE: CPT

## 2019-01-25 PROCEDURE — 85025 COMPLETE CBC W/AUTO DIFF WBC: CPT

## 2019-01-25 PROCEDURE — 84443 ASSAY THYROID STIM HORMONE: CPT

## 2019-01-25 PROCEDURE — 95886 MUSC TEST DONE W/N TEST COMP: CPT | Performed by: PHYSICAL MEDICINE & REHABILITATION

## 2019-01-25 PROCEDURE — 82746 ASSAY OF FOLIC ACID SERUM: CPT

## 2019-01-25 PROCEDURE — 82607 VITAMIN B-12: CPT

## 2019-01-27 ASSESSMENT — ENCOUNTER SYMPTOMS
ABDOMINAL PAIN: 0
NAUSEA: 0
SHORTNESS OF BREATH: 0
WHEEZING: 0
EYES NEGATIVE: 1
COUGH: 0

## 2019-01-29 ENCOUNTER — TELEPHONE (OUTPATIENT)
Dept: INTERNAL MEDICINE CLINIC | Age: 84
End: 2019-01-29

## 2019-02-01 PROBLEM — N17.9 ACUTE KIDNEY INJURY (HCC): Status: ACTIVE | Noted: 2019-02-01

## 2019-02-01 PROBLEM — N18.32 CHRONIC KIDNEY DISEASE (CKD) STAGE G3B/A3, MODERATELY DECREASED GLOMERULAR FILTRATION RATE (GFR) BETWEEN 30-44 ML/MIN/1.73 SQUARE METER AND ALBUMINURIA CREATININE RATIO GREATER THAN 300 MG/G (HCC): Status: ACTIVE | Noted: 2019-02-01

## 2019-02-06 ENCOUNTER — TELEPHONE (OUTPATIENT)
Dept: INTERNAL MEDICINE CLINIC | Age: 84
End: 2019-02-06

## 2019-02-18 ENCOUNTER — HOSPITAL ENCOUNTER (OUTPATIENT)
Age: 84
Discharge: HOME | End: 2019-02-18
Payer: MEDICARE

## 2019-02-18 LAB
ANION GAP SERPL CALCULATED.3IONS-SCNC: 15 MMOL/L (ref 4–16)
BACTERIA: NEGATIVE /HPF
BILIRUBIN URINE: NEGATIVE MG/DL
BLOOD, URINE: NEGATIVE
BUN BLDV-MCNC: 41 MG/DL (ref 6–23)
CALCIUM SERPL-MCNC: 9.4 MG/DL (ref 8.3–10.6)
CHLORIDE BLD-SCNC: 100 MMOL/L (ref 99–110)
CLARITY: CLEAR
CO2: 27 MMOL/L (ref 21–32)
COLOR: YELLOW
CREAT SERPL-MCNC: 1.8 MG/DL (ref 0.6–1.1)
CREATININE URINE: 21.8 MG/DL (ref 28–217)
GFR AFRICAN AMERICAN: 33 ML/MIN/1.73M2
GFR NON-AFRICAN AMERICAN: 27 ML/MIN/1.73M2
GLUCOSE BLD-MCNC: 107 MG/DL (ref 70–99)
GLUCOSE, URINE: NEGATIVE MG/DL
KETONES, URINE: NEGATIVE MG/DL
LEUKOCYTE ESTERASE, URINE: NEGATIVE
MAGNESIUM: 2.1 MG/DL (ref 1.8–2.4)
NITRITE URINE, QUANTITATIVE: NEGATIVE
PH, URINE: 5 (ref 5–8)
PHOSPHORUS: 4.1 MG/DL (ref 2.5–4.9)
POTASSIUM SERPL-SCNC: 4.1 MMOL/L (ref 3.5–5.1)
PROT/CREAT RATIO, UR: 1.1
PROTEIN UA: NEGATIVE MG/DL
RBC URINE: <1 /HPF (ref 0–6)
SODIUM BLD-SCNC: 142 MMOL/L (ref 135–145)
SPECIFIC GRAVITY UA: 1 (ref 1–1.03)
SQUAMOUS EPITHELIAL: 1 /HPF
TRICHOMONAS: NORMAL /HPF
URINE TOTAL PROTEIN: 24.1 MG/DL
UROBILINOGEN, URINE: NORMAL MG/DL (ref 0.2–1)
WBC UA: NORMAL /HPF (ref 0–5)

## 2019-02-18 PROCEDURE — 36415 COLL VENOUS BLD VENIPUNCTURE: CPT

## 2019-02-18 PROCEDURE — 80048 BASIC METABOLIC PNL TOTAL CA: CPT

## 2019-02-18 PROCEDURE — 84156 ASSAY OF PROTEIN URINE: CPT

## 2019-02-18 PROCEDURE — 83735 ASSAY OF MAGNESIUM: CPT

## 2019-02-18 PROCEDURE — 82570 ASSAY OF URINE CREATININE: CPT

## 2019-02-18 PROCEDURE — 81001 URINALYSIS AUTO W/SCOPE: CPT

## 2019-02-18 PROCEDURE — 84100 ASSAY OF PHOSPHORUS: CPT

## 2019-03-06 ENCOUNTER — HOSPITAL ENCOUNTER (INPATIENT)
Age: 84
LOS: 5 days | Discharge: HOME | DRG: 682 | End: 2019-03-11
Attending: EMERGENCY MEDICINE | Admitting: INTERNAL MEDICINE
Payer: MEDICARE

## 2019-03-06 ENCOUNTER — APPOINTMENT (OUTPATIENT)
Dept: GENERAL RADIOLOGY | Age: 84
DRG: 682 | End: 2019-03-06
Payer: MEDICARE

## 2019-03-06 DIAGNOSIS — N17.9 AKI (ACUTE KIDNEY INJURY) (HCC): ICD-10-CM

## 2019-03-06 DIAGNOSIS — N17.9 ACUTE RENAL FAILURE SUPERIMPOSED ON CHRONIC KIDNEY DISEASE, UNSPECIFIED CKD STAGE, UNSPECIFIED ACUTE RENAL FAILURE TYPE (HCC): Primary | ICD-10-CM

## 2019-03-06 DIAGNOSIS — N18.9 ACUTE RENAL FAILURE SUPERIMPOSED ON CHRONIC KIDNEY DISEASE, UNSPECIFIED CKD STAGE, UNSPECIFIED ACUTE RENAL FAILURE TYPE (HCC): Primary | ICD-10-CM

## 2019-03-06 DIAGNOSIS — R11.2 NON-INTRACTABLE VOMITING WITH NAUSEA, UNSPECIFIED VOMITING TYPE: ICD-10-CM

## 2019-03-06 DIAGNOSIS — E87.6 HYPOKALEMIA: ICD-10-CM

## 2019-03-06 LAB
ALBUMIN SERPL-MCNC: 4.4 GM/DL (ref 3.4–5)
ALP BLD-CCNC: 73 IU/L (ref 40–129)
ALT SERPL-CCNC: 6 U/L (ref 10–40)
ANION GAP SERPL CALCULATED.3IONS-SCNC: 19 MMOL/L (ref 4–16)
AST SERPL-CCNC: 18 IU/L (ref 15–37)
BACTERIA: NEGATIVE /HPF
BASOPHILS ABSOLUTE: 0.1 K/CU MM
BASOPHILS RELATIVE PERCENT: 0.8 % (ref 0–1)
BILIRUB SERPL-MCNC: 0.3 MG/DL (ref 0–1)
BILIRUBIN URINE: NEGATIVE MG/DL
BLOOD, URINE: NEGATIVE
BUN BLDV-MCNC: 95 MG/DL (ref 6–23)
CALCIUM SERPL-MCNC: 10.7 MG/DL (ref 8.3–10.6)
CHLORIDE BLD-SCNC: 86 MMOL/L (ref 99–110)
CHLORIDE URINE RANDOM: 63 MMOL/L (ref 43–210)
CLARITY: CLEAR
CO2: 34 MMOL/L (ref 21–32)
COLOR: YELLOW
CREAT SERPL-MCNC: 2.5 MG/DL (ref 0.6–1.1)
CREATININE URINE: 54.9 MG/DL (ref 28–217)
DIFFERENTIAL TYPE: ABNORMAL
EOSINOPHILS ABSOLUTE: 0.1 K/CU MM
EOSINOPHILS RELATIVE PERCENT: 1.7 % (ref 0–3)
GFR AFRICAN AMERICAN: 22 ML/MIN/1.73M2
GFR NON-AFRICAN AMERICAN: 18 ML/MIN/1.73M2
GLUCOSE BLD-MCNC: 161 MG/DL (ref 70–99)
GLUCOSE, URINE: NEGATIVE MG/DL
HCT VFR BLD CALC: 29.3 % (ref 37–47)
HEMOGLOBIN: 9.3 GM/DL (ref 12.5–16)
IMMATURE NEUTROPHIL %: 0.4 % (ref 0–0.43)
KETONES, URINE: NEGATIVE MG/DL
LEUKOCYTE ESTERASE, URINE: NEGATIVE
LYMPHOCYTES ABSOLUTE: 1.9 K/CU MM
LYMPHOCYTES RELATIVE PERCENT: 24 % (ref 24–44)
MCH RBC QN AUTO: 29.5 PG (ref 27–31)
MCHC RBC AUTO-ENTMCNC: 31.7 % (ref 32–36)
MCV RBC AUTO: 93 FL (ref 78–100)
MONOCYTES ABSOLUTE: 0.6 K/CU MM
MONOCYTES RELATIVE PERCENT: 7.6 % (ref 0–4)
NITRITE URINE, QUANTITATIVE: NEGATIVE
NUCLEATED RBC %: 0 %
OSMOLALITY URINE: 352 MOS/L (ref 292–1090)
PDW BLD-RTO: 12.4 % (ref 11.7–14.9)
PH, URINE: 7 (ref 5–8)
PLATELET # BLD: 510 K/CU MM (ref 140–440)
PMV BLD AUTO: 10 FL (ref 7.5–11.1)
POTASSIUM SERPL-SCNC: 2.8 MMOL/L (ref 3.5–5.1)
POTASSIUM, UR: 30.6 MMOL/L (ref 22–119)
PRO-BNP: 3430 PG/ML
PROT/CREAT RATIO, UR: 0.8
PROTEIN UA: 30 MG/DL
RBC # BLD: 3.15 M/CU MM (ref 4.2–5.4)
RBC URINE: <1 /HPF (ref 0–6)
SEGMENTED NEUTROPHILS ABSOLUTE COUNT: 5.1 K/CU MM
SEGMENTED NEUTROPHILS RELATIVE PERCENT: 65.5 % (ref 36–66)
SODIUM BLD-SCNC: 139 MMOL/L (ref 135–145)
SODIUM URINE: 68 MMOL/L (ref 35–167)
SPECIFIC GRAVITY UA: 1.01 (ref 1–1.03)
TOTAL IMMATURE NEUTOROPHIL: 0.03 K/CU MM
TOTAL NUCLEATED RBC: 0 K/CU MM
TOTAL PROTEIN: 7.6 GM/DL (ref 6.4–8.2)
TRICHOMONAS: ABNORMAL /HPF
TROPONIN T: 0.01 NG/ML
URINE TOTAL PROTEIN: 43.1 MG/DL
UROBILINOGEN, URINE: NORMAL MG/DL (ref 0.2–1)
WBC # BLD: 7.7 K/CU MM (ref 4–10.5)
WBC UA: 1 /HPF (ref 0–5)

## 2019-03-06 PROCEDURE — 84156 ASSAY OF PROTEIN URINE: CPT

## 2019-03-06 PROCEDURE — 6360000002 HC RX W HCPCS: Performed by: EMERGENCY MEDICINE

## 2019-03-06 PROCEDURE — 83880 ASSAY OF NATRIURETIC PEPTIDE: CPT

## 2019-03-06 PROCEDURE — 99285 EMERGENCY DEPT VISIT HI MDM: CPT

## 2019-03-06 PROCEDURE — 2580000003 HC RX 258: Performed by: NURSE PRACTITIONER

## 2019-03-06 PROCEDURE — 6370000000 HC RX 637 (ALT 250 FOR IP): Performed by: EMERGENCY MEDICINE

## 2019-03-06 PROCEDURE — 6370000000 HC RX 637 (ALT 250 FOR IP): Performed by: NURSE PRACTITIONER

## 2019-03-06 PROCEDURE — 82436 ASSAY OF URINE CHLORIDE: CPT

## 2019-03-06 PROCEDURE — 96366 THER/PROPH/DIAG IV INF ADDON: CPT

## 2019-03-06 PROCEDURE — 6370000000 HC RX 637 (ALT 250 FOR IP): Performed by: HOSPITALIST

## 2019-03-06 PROCEDURE — 2580000003 HC RX 258: Performed by: EMERGENCY MEDICINE

## 2019-03-06 PROCEDURE — 1200000000 HC SEMI PRIVATE

## 2019-03-06 PROCEDURE — 96375 TX/PRO/DX INJ NEW DRUG ADDON: CPT

## 2019-03-06 PROCEDURE — 84484 ASSAY OF TROPONIN QUANT: CPT

## 2019-03-06 PROCEDURE — 93010 ELECTROCARDIOGRAM REPORT: CPT | Performed by: INTERNAL MEDICINE

## 2019-03-06 PROCEDURE — 84133 ASSAY OF URINE POTASSIUM: CPT

## 2019-03-06 PROCEDURE — 85025 COMPLETE CBC W/AUTO DIFF WBC: CPT

## 2019-03-06 PROCEDURE — 83935 ASSAY OF URINE OSMOLALITY: CPT

## 2019-03-06 PROCEDURE — 96365 THER/PROPH/DIAG IV INF INIT: CPT

## 2019-03-06 PROCEDURE — 36415 COLL VENOUS BLD VENIPUNCTURE: CPT

## 2019-03-06 PROCEDURE — 74022 RADEX COMPL AQT ABD SERIES: CPT

## 2019-03-06 PROCEDURE — 82570 ASSAY OF URINE CREATININE: CPT

## 2019-03-06 PROCEDURE — 2500000003 HC RX 250 WO HCPCS: Performed by: EMERGENCY MEDICINE

## 2019-03-06 PROCEDURE — 80053 COMPREHEN METABOLIC PANEL: CPT

## 2019-03-06 PROCEDURE — 93005 ELECTROCARDIOGRAM TRACING: CPT | Performed by: EMERGENCY MEDICINE

## 2019-03-06 PROCEDURE — 81001 URINALYSIS AUTO W/SCOPE: CPT

## 2019-03-06 PROCEDURE — 6360000002 HC RX W HCPCS: Performed by: NURSE PRACTITIONER

## 2019-03-06 PROCEDURE — 84300 ASSAY OF URINE SODIUM: CPT

## 2019-03-06 RX ORDER — CILOSTAZOL 100 MG/1
100 TABLET ORAL DAILY
Status: DISCONTINUED | OUTPATIENT
Start: 2019-03-07 | End: 2019-03-11 | Stop reason: HOSPADM

## 2019-03-06 RX ORDER — 0.9 % SODIUM CHLORIDE 0.9 %
500 INTRAVENOUS SOLUTION INTRAVENOUS ONCE
Status: COMPLETED | OUTPATIENT
Start: 2019-03-06 | End: 2019-03-06

## 2019-03-06 RX ORDER — SODIUM CHLORIDE 0.9 % (FLUSH) 0.9 %
10 SYRINGE (ML) INJECTION EVERY 12 HOURS SCHEDULED
Status: DISCONTINUED | OUTPATIENT
Start: 2019-03-06 | End: 2019-03-11 | Stop reason: HOSPADM

## 2019-03-06 RX ORDER — POTASSIUM CHLORIDE 7.45 MG/ML
10 INJECTION INTRAVENOUS PRN
Status: DISCONTINUED | OUTPATIENT
Start: 2019-03-06 | End: 2019-03-11 | Stop reason: HOSPADM

## 2019-03-06 RX ORDER — SODIUM CHLORIDE 9 MG/ML
INJECTION, SOLUTION INTRAVENOUS CONTINUOUS
Status: CANCELLED | OUTPATIENT
Start: 2019-03-06

## 2019-03-06 RX ORDER — POTASSIUM CHLORIDE 7.45 MG/ML
10 INJECTION INTRAVENOUS ONCE
Status: COMPLETED | OUTPATIENT
Start: 2019-03-06 | End: 2019-03-06

## 2019-03-06 RX ORDER — GABAPENTIN 100 MG/1
100 CAPSULE ORAL NIGHTLY
COMMUNITY
End: 2020-03-13 | Stop reason: SDUPTHER

## 2019-03-06 RX ORDER — OXYBUTYNIN CHLORIDE 5 MG/1
5 TABLET, EXTENDED RELEASE ORAL NIGHTLY
Status: DISCONTINUED | OUTPATIENT
Start: 2019-03-06 | End: 2019-03-11 | Stop reason: HOSPADM

## 2019-03-06 RX ORDER — DOCUSATE SODIUM 100 MG/1
100 CAPSULE, LIQUID FILLED ORAL 2 TIMES DAILY
Status: DISCONTINUED | OUTPATIENT
Start: 2019-03-06 | End: 2019-03-11 | Stop reason: HOSPADM

## 2019-03-06 RX ORDER — CARVEDILOL 3.12 MG/1
3.12 TABLET ORAL 2 TIMES DAILY WITH MEALS
Status: DISCONTINUED | OUTPATIENT
Start: 2019-03-07 | End: 2019-03-11 | Stop reason: HOSPADM

## 2019-03-06 RX ORDER — HEPARIN SODIUM 5000 [USP'U]/ML
5000 INJECTION, SOLUTION INTRAVENOUS; SUBCUTANEOUS EVERY 8 HOURS SCHEDULED
Status: DISCONTINUED | OUTPATIENT
Start: 2019-03-06 | End: 2019-03-08

## 2019-03-06 RX ORDER — SODIUM CHLORIDE 9 MG/ML
INJECTION, SOLUTION INTRAVENOUS CONTINUOUS
Status: DISCONTINUED | OUTPATIENT
Start: 2019-03-06 | End: 2019-03-10

## 2019-03-06 RX ORDER — ONDANSETRON 2 MG/ML
4 INJECTION INTRAMUSCULAR; INTRAVENOUS EVERY 6 HOURS PRN
Status: DISCONTINUED | OUTPATIENT
Start: 2019-03-06 | End: 2019-03-11 | Stop reason: HOSPADM

## 2019-03-06 RX ORDER — GABAPENTIN 100 MG/1
100 CAPSULE ORAL NIGHTLY
Status: DISCONTINUED | OUTPATIENT
Start: 2019-03-06 | End: 2019-03-11 | Stop reason: HOSPADM

## 2019-03-06 RX ORDER — ONDANSETRON 2 MG/ML
4 INJECTION INTRAMUSCULAR; INTRAVENOUS EVERY 30 MIN PRN
Status: DISCONTINUED | OUTPATIENT
Start: 2019-03-06 | End: 2019-03-06

## 2019-03-06 RX ORDER — ALBUTEROL SULFATE 90 UG/1
2 AEROSOL, METERED RESPIRATORY (INHALATION) EVERY 6 HOURS PRN
Status: DISCONTINUED | OUTPATIENT
Start: 2019-03-06 | End: 2019-03-11 | Stop reason: HOSPADM

## 2019-03-06 RX ORDER — SODIUM CHLORIDE 0.9 % (FLUSH) 0.9 %
10 SYRINGE (ML) INJECTION PRN
Status: DISCONTINUED | OUTPATIENT
Start: 2019-03-06 | End: 2019-03-11 | Stop reason: HOSPADM

## 2019-03-06 RX ORDER — CLOPIDOGREL BISULFATE 75 MG/1
75 TABLET ORAL DAILY
Status: DISCONTINUED | OUTPATIENT
Start: 2019-03-07 | End: 2019-03-11 | Stop reason: HOSPADM

## 2019-03-06 RX ORDER — POTASSIUM CHLORIDE 20 MEQ/1
40 TABLET, EXTENDED RELEASE ORAL ONCE
Status: COMPLETED | OUTPATIENT
Start: 2019-03-06 | End: 2019-03-06

## 2019-03-06 RX ADMIN — FAMOTIDINE 20 MG: 10 INJECTION, SOLUTION INTRAVENOUS at 19:11

## 2019-03-06 RX ADMIN — SODIUM CHLORIDE 500 ML: 9 INJECTION, SOLUTION INTRAVENOUS at 19:42

## 2019-03-06 RX ADMIN — POTASSIUM CHLORIDE 40 MEQ: 1500 TABLET, EXTENDED RELEASE ORAL at 19:15

## 2019-03-06 RX ADMIN — SODIUM CHLORIDE, PRESERVATIVE FREE 10 ML: 5 INJECTION INTRAVENOUS at 22:47

## 2019-03-06 RX ADMIN — HEPARIN SODIUM 5000 UNITS: 5000 INJECTION, SOLUTION INTRAVENOUS; SUBCUTANEOUS at 22:40

## 2019-03-06 RX ADMIN — SODIUM CHLORIDE: 9 INJECTION, SOLUTION INTRAVENOUS at 22:40

## 2019-03-06 RX ADMIN — ONDANSETRON 4 MG: 2 INJECTION INTRAMUSCULAR; INTRAVENOUS at 18:41

## 2019-03-06 RX ADMIN — GABAPENTIN 100 MG: 100 CAPSULE ORAL at 22:40

## 2019-03-06 RX ADMIN — DOCUSATE SODIUM 100 MG: 100 CAPSULE, LIQUID FILLED ORAL at 22:40

## 2019-03-06 RX ADMIN — POTASSIUM CHLORIDE 10 MEQ: 7.46 INJECTION, SOLUTION INTRAVENOUS at 19:42

## 2019-03-06 ASSESSMENT — PAIN SCALES - GENERAL: PAINLEVEL_OUTOF10: 0

## 2019-03-07 LAB
ALBUMIN SERPL-MCNC: 3.8 GM/DL (ref 3.4–5)
ALP BLD-CCNC: 59 IU/L (ref 40–128)
ALT SERPL-CCNC: <5 U/L (ref 10–40)
ANION GAP SERPL CALCULATED.3IONS-SCNC: 12 MMOL/L (ref 4–16)
ANION GAP SERPL CALCULATED.3IONS-SCNC: 12 MMOL/L (ref 4–16)
AST SERPL-CCNC: 14 IU/L (ref 15–37)
BASOPHILS ABSOLUTE: 0.1 K/CU MM
BASOPHILS RELATIVE PERCENT: 0.7 % (ref 0–1)
BILIRUB SERPL-MCNC: 0.2 MG/DL (ref 0–1)
BUN BLDV-MCNC: 80 MG/DL (ref 6–23)
BUN BLDV-MCNC: 91 MG/DL (ref 6–23)
CALCIUM SERPL-MCNC: 8.9 MG/DL (ref 8.3–10.6)
CALCIUM SERPL-MCNC: 9.6 MG/DL (ref 8.3–10.6)
CHLORIDE BLD-SCNC: 95 MMOL/L (ref 99–110)
CHLORIDE BLD-SCNC: 99 MMOL/L (ref 99–110)
CHLORIDE URINE RANDOM: 16 MMOL/L (ref 43–210)
CO2: 30 MMOL/L (ref 21–32)
CO2: 35 MMOL/L (ref 21–32)
CREAT SERPL-MCNC: 2.3 MG/DL (ref 0.6–1.1)
CREAT SERPL-MCNC: 2.7 MG/DL (ref 0.6–1.1)
CREATININE URINE: 78.8 MG/DL (ref 28–217)
DIFFERENTIAL TYPE: ABNORMAL
EOSINOPHILS ABSOLUTE: 0.4 K/CU MM
EOSINOPHILS RELATIVE PERCENT: 5.8 % (ref 0–3)
GFR AFRICAN AMERICAN: 20 ML/MIN/1.73M2
GFR AFRICAN AMERICAN: 25 ML/MIN/1.73M2
GFR NON-AFRICAN AMERICAN: 17 ML/MIN/1.73M2
GFR NON-AFRICAN AMERICAN: 20 ML/MIN/1.73M2
GLUCOSE BLD-MCNC: 101 MG/DL (ref 70–99)
GLUCOSE BLD-MCNC: 144 MG/DL (ref 70–99)
HCT VFR BLD CALC: 23 % (ref 37–47)
HEMOGLOBIN: 7.4 GM/DL (ref 12.5–16)
IMMATURE NEUTROPHIL %: 0.1 % (ref 0–0.43)
LYMPHOCYTES ABSOLUTE: 2.6 K/CU MM
LYMPHOCYTES RELATIVE PERCENT: 38.4 % (ref 24–44)
MAGNESIUM: 2.3 MG/DL (ref 1.8–2.4)
MCH RBC QN AUTO: 30 PG (ref 27–31)
MCHC RBC AUTO-ENTMCNC: 32.2 % (ref 32–36)
MCV RBC AUTO: 93.1 FL (ref 78–100)
MONOCYTES ABSOLUTE: 0.8 K/CU MM
MONOCYTES RELATIVE PERCENT: 11.2 % (ref 0–4)
NUCLEATED RBC %: 0 %
PDW BLD-RTO: 12.5 % (ref 11.7–14.9)
PLATELET # BLD: 391 K/CU MM (ref 140–440)
PMV BLD AUTO: 9.9 FL (ref 7.5–11.1)
POTASSIUM SERPL-SCNC: 3.4 MMOL/L (ref 3.5–5.1)
POTASSIUM SERPL-SCNC: 3.7 MMOL/L (ref 3.5–5.1)
POTASSIUM, UR: 26 MMOL/L (ref 22–119)
PROT/CREAT RATIO, UR: 0.5
RBC # BLD: 2.47 M/CU MM (ref 4.2–5.4)
SEGMENTED NEUTROPHILS ABSOLUTE COUNT: 2.9 K/CU MM
SEGMENTED NEUTROPHILS RELATIVE PERCENT: 43.8 % (ref 36–66)
SODIUM BLD-SCNC: 141 MMOL/L (ref 135–145)
SODIUM BLD-SCNC: 142 MMOL/L (ref 135–145)
SODIUM URINE: 36 MMOL/L (ref 35–167)
TOTAL IMMATURE NEUTOROPHIL: 0.01 K/CU MM
TOTAL NUCLEATED RBC: 0 K/CU MM
TOTAL PROTEIN: 5.8 GM/DL (ref 6.4–8.2)
URINE TOTAL PROTEIN: 36.7 MG/DL
WBC # BLD: 6.7 K/CU MM (ref 4–10.5)

## 2019-03-07 PROCEDURE — 80048 BASIC METABOLIC PNL TOTAL CA: CPT

## 2019-03-07 PROCEDURE — 84156 ASSAY OF PROTEIN URINE: CPT

## 2019-03-07 PROCEDURE — 36415 COLL VENOUS BLD VENIPUNCTURE: CPT

## 2019-03-07 PROCEDURE — 82436 ASSAY OF URINE CHLORIDE: CPT

## 2019-03-07 PROCEDURE — 84133 ASSAY OF URINE POTASSIUM: CPT

## 2019-03-07 PROCEDURE — 85025 COMPLETE CBC W/AUTO DIFF WBC: CPT

## 2019-03-07 PROCEDURE — 6370000000 HC RX 637 (ALT 250 FOR IP): Performed by: HOSPITALIST

## 2019-03-07 PROCEDURE — 83735 ASSAY OF MAGNESIUM: CPT

## 2019-03-07 PROCEDURE — 82570 ASSAY OF URINE CREATININE: CPT

## 2019-03-07 PROCEDURE — 2500000003 HC RX 250 WO HCPCS: Performed by: NURSE PRACTITIONER

## 2019-03-07 PROCEDURE — 80053 COMPREHEN METABOLIC PANEL: CPT

## 2019-03-07 PROCEDURE — 1200000000 HC SEMI PRIVATE

## 2019-03-07 PROCEDURE — 6370000000 HC RX 637 (ALT 250 FOR IP): Performed by: INTERNAL MEDICINE

## 2019-03-07 PROCEDURE — 6360000002 HC RX W HCPCS: Performed by: NURSE PRACTITIONER

## 2019-03-07 PROCEDURE — 84300 ASSAY OF URINE SODIUM: CPT

## 2019-03-07 PROCEDURE — 6370000000 HC RX 637 (ALT 250 FOR IP): Performed by: NURSE PRACTITIONER

## 2019-03-07 PROCEDURE — 2580000003 HC RX 258: Performed by: NURSE PRACTITIONER

## 2019-03-07 RX ORDER — POTASSIUM CHLORIDE 20MEQ/15ML
20 LIQUID (ML) ORAL ONCE
Status: DISCONTINUED | OUTPATIENT
Start: 2019-03-07 | End: 2019-03-11 | Stop reason: HOSPADM

## 2019-03-07 RX ORDER — POTASSIUM CHLORIDE 20 MEQ/1
20 TABLET, EXTENDED RELEASE ORAL ONCE
Status: COMPLETED | OUTPATIENT
Start: 2019-03-07 | End: 2019-03-07

## 2019-03-07 RX ADMIN — HEPARIN SODIUM 5000 UNITS: 5000 INJECTION, SOLUTION INTRAVENOUS; SUBCUTANEOUS at 20:36

## 2019-03-07 RX ADMIN — DOCUSATE SODIUM 100 MG: 100 CAPSULE, LIQUID FILLED ORAL at 20:36

## 2019-03-07 RX ADMIN — HEPARIN SODIUM 5000 UNITS: 5000 INJECTION, SOLUTION INTRAVENOUS; SUBCUTANEOUS at 14:21

## 2019-03-07 RX ADMIN — HEPARIN SODIUM 5000 UNITS: 5000 INJECTION, SOLUTION INTRAVENOUS; SUBCUTANEOUS at 06:59

## 2019-03-07 RX ADMIN — SODIUM CHLORIDE: 9 INJECTION, SOLUTION INTRAVENOUS at 06:59

## 2019-03-07 RX ADMIN — FAMOTIDINE 20 MG: 10 INJECTION, SOLUTION INTRAVENOUS at 09:59

## 2019-03-07 RX ADMIN — POTASSIUM CHLORIDE 20 MEQ: 1500 TABLET, EXTENDED RELEASE ORAL at 10:04

## 2019-03-07 RX ADMIN — OXYBUTYNIN CHLORIDE 5 MG: 5 TABLET, EXTENDED RELEASE ORAL at 20:36

## 2019-03-07 RX ADMIN — GABAPENTIN 100 MG: 100 CAPSULE ORAL at 20:36

## 2019-03-07 RX ADMIN — DOCUSATE SODIUM 100 MG: 100 CAPSULE, LIQUID FILLED ORAL at 09:53

## 2019-03-07 RX ADMIN — CILOSTAZOL 100 MG: 100 TABLET ORAL at 10:04

## 2019-03-07 RX ADMIN — CLOPIDOGREL BISULFATE 75 MG: 75 TABLET ORAL at 09:51

## 2019-03-07 RX ADMIN — CARVEDILOL 3.12 MG: 3.12 TABLET, FILM COATED ORAL at 09:51

## 2019-03-07 ASSESSMENT — PAIN SCALES - GENERAL
PAINLEVEL_OUTOF10: 0
PAINLEVEL_OUTOF10: 0

## 2019-03-08 PROBLEM — D50.9 IRON DEFICIENCY ANEMIA: Status: ACTIVE | Noted: 2019-03-08

## 2019-03-08 PROBLEM — K62.5 GASTROINTESTINAL HEMORRHAGE ASSOCIATED WITH ANORECTAL SOURCE: Status: ACTIVE | Noted: 2019-03-08

## 2019-03-08 LAB
ALBUMIN SERPL-MCNC: 3.4 GM/DL (ref 3.4–5)
ANION GAP SERPL CALCULATED.3IONS-SCNC: 8 MMOL/L (ref 4–16)
BUN BLDV-MCNC: 68 MG/DL (ref 6–23)
CALCIUM SERPL-MCNC: 8.7 MG/DL (ref 8.3–10.6)
CHLORIDE BLD-SCNC: 106 MMOL/L (ref 99–110)
CO2: 31 MMOL/L (ref 21–32)
CREAT SERPL-MCNC: 2.5 MG/DL (ref 0.6–1.1)
FERRITIN: 39 NG/ML (ref 15–150)
GFR AFRICAN AMERICAN: 22 ML/MIN/1.73M2
GFR NON-AFRICAN AMERICAN: 18 ML/MIN/1.73M2
GLUCOSE BLD-MCNC: 106 MG/DL (ref 70–99)
HCT VFR BLD CALC: 21.6 % (ref 37–47)
HCT VFR BLD CALC: 24.8 % (ref 37–47)
HEMOCCULT SP1 STL QL: POSITIVE
HEMOGLOBIN: 6.5 GM/DL (ref 12.5–16)
HEMOGLOBIN: 7.5 GM/DL (ref 12.5–16)
IRON: 30 UG/DL (ref 37–145)
MCH RBC QN AUTO: 28.9 PG (ref 27–31)
MCH RBC QN AUTO: 29.6 PG (ref 27–31)
MCHC RBC AUTO-ENTMCNC: 30.1 % (ref 32–36)
MCHC RBC AUTO-ENTMCNC: 30.2 % (ref 32–36)
MCV RBC AUTO: 96 FL (ref 78–100)
MCV RBC AUTO: 98 FL (ref 78–100)
PCT TRANSFERRIN: 8 % (ref 10–44)
PDW BLD-RTO: 12.4 % (ref 11.7–14.9)
PDW BLD-RTO: 12.8 % (ref 11.7–14.9)
PHOSPHORUS: 3.5 MG/DL (ref 2.5–4.9)
PLATELET # BLD: 361 K/CU MM (ref 140–440)
PLATELET # BLD: 394 K/CU MM (ref 140–440)
PMV BLD AUTO: 10.1 FL (ref 7.5–11.1)
PMV BLD AUTO: 9.9 FL (ref 7.5–11.1)
POTASSIUM SERPL-SCNC: 4 MMOL/L (ref 3.5–5.1)
RBC # BLD: 2.25 M/CU MM (ref 4.2–5.4)
RBC # BLD: 2.53 M/CU MM (ref 4.2–5.4)
SODIUM BLD-SCNC: 145 MMOL/L (ref 135–145)
TOTAL IRON BINDING CAPACITY: 362 UG/DL (ref 250–450)
UNSATURATED IRON BINDING CAPACITY: 332 UG/DL (ref 110–370)
WBC # BLD: 6.5 K/CU MM (ref 4–10.5)
WBC # BLD: 7.1 K/CU MM (ref 4–10.5)

## 2019-03-08 PROCEDURE — 86922 COMPATIBILITY TEST ANTIGLOB: CPT

## 2019-03-08 PROCEDURE — 83540 ASSAY OF IRON: CPT

## 2019-03-08 PROCEDURE — 99221 1ST HOSP IP/OBS SF/LOW 40: CPT | Performed by: INTERNAL MEDICINE

## 2019-03-08 PROCEDURE — 2580000003 HC RX 258: Performed by: INTERNAL MEDICINE

## 2019-03-08 PROCEDURE — 86850 RBC ANTIBODY SCREEN: CPT

## 2019-03-08 PROCEDURE — 82728 ASSAY OF FERRITIN: CPT

## 2019-03-08 PROCEDURE — 86901 BLOOD TYPING SEROLOGIC RH(D): CPT

## 2019-03-08 PROCEDURE — 36415 COLL VENOUS BLD VENIPUNCTURE: CPT

## 2019-03-08 PROCEDURE — 6370000000 HC RX 637 (ALT 250 FOR IP): Performed by: HOSPITALIST

## 2019-03-08 PROCEDURE — 85027 COMPLETE CBC AUTOMATED: CPT

## 2019-03-08 PROCEDURE — 6360000002 HC RX W HCPCS: Performed by: NURSE PRACTITIONER

## 2019-03-08 PROCEDURE — 83550 IRON BINDING TEST: CPT

## 2019-03-08 PROCEDURE — 36430 TRANSFUSION BLD/BLD COMPNT: CPT

## 2019-03-08 PROCEDURE — 6360000002 HC RX W HCPCS: Performed by: HOSPITALIST

## 2019-03-08 PROCEDURE — 86900 BLOOD TYPING SEROLOGIC ABO: CPT

## 2019-03-08 PROCEDURE — 80069 RENAL FUNCTION PANEL: CPT

## 2019-03-08 PROCEDURE — 6370000000 HC RX 637 (ALT 250 FOR IP): Performed by: NURSE PRACTITIONER

## 2019-03-08 PROCEDURE — G0328 FECAL BLOOD SCRN IMMUNOASSAY: HCPCS

## 2019-03-08 PROCEDURE — C9113 INJ PANTOPRAZOLE SODIUM, VIA: HCPCS | Performed by: HOSPITALIST

## 2019-03-08 PROCEDURE — P9016 RBC LEUKOCYTES REDUCED: HCPCS

## 2019-03-08 PROCEDURE — 2500000003 HC RX 250 WO HCPCS: Performed by: NURSE PRACTITIONER

## 2019-03-08 PROCEDURE — 1200000000 HC SEMI PRIVATE

## 2019-03-08 PROCEDURE — 2580000003 HC RX 258: Performed by: HOSPITALIST

## 2019-03-08 RX ORDER — PANTOPRAZOLE SODIUM 40 MG/10ML
40 INJECTION, POWDER, LYOPHILIZED, FOR SOLUTION INTRAVENOUS 2 TIMES DAILY
Status: DISCONTINUED | OUTPATIENT
Start: 2019-03-08 | End: 2019-03-11 | Stop reason: HOSPADM

## 2019-03-08 RX ORDER — SODIUM CHLORIDE 9 MG/ML
INJECTION, SOLUTION INTRAVENOUS CONTINUOUS
Status: DISCONTINUED | OUTPATIENT
Start: 2019-03-08 | End: 2019-03-09

## 2019-03-08 RX ORDER — 0.9 % SODIUM CHLORIDE 0.9 %
250 INTRAVENOUS SOLUTION INTRAVENOUS ONCE
Status: COMPLETED | OUTPATIENT
Start: 2019-03-08 | End: 2019-03-08

## 2019-03-08 RX ADMIN — OXYBUTYNIN CHLORIDE 5 MG: 5 TABLET, EXTENDED RELEASE ORAL at 20:51

## 2019-03-08 RX ADMIN — CARVEDILOL 3.12 MG: 3.12 TABLET, FILM COATED ORAL at 08:51

## 2019-03-08 RX ADMIN — FAMOTIDINE 20 MG: 10 INJECTION, SOLUTION INTRAVENOUS at 08:52

## 2019-03-08 RX ADMIN — SODIUM CHLORIDE: 9 INJECTION, SOLUTION INTRAVENOUS at 08:43

## 2019-03-08 RX ADMIN — GABAPENTIN 100 MG: 100 CAPSULE ORAL at 20:51

## 2019-03-08 RX ADMIN — SODIUM CHLORIDE: 9 INJECTION, SOLUTION INTRAVENOUS at 06:01

## 2019-03-08 RX ADMIN — DOCUSATE SODIUM 100 MG: 100 CAPSULE, LIQUID FILLED ORAL at 20:51

## 2019-03-08 RX ADMIN — SODIUM CHLORIDE 250 ML: 9 INJECTION, SOLUTION INTRAVENOUS at 11:10

## 2019-03-08 RX ADMIN — CARVEDILOL 3.12 MG: 3.12 TABLET, FILM COATED ORAL at 17:52

## 2019-03-08 RX ADMIN — CLOPIDOGREL BISULFATE 75 MG: 75 TABLET ORAL at 08:51

## 2019-03-08 RX ADMIN — DOCUSATE SODIUM 100 MG: 100 CAPSULE, LIQUID FILLED ORAL at 08:51

## 2019-03-08 RX ADMIN — PANTOPRAZOLE SODIUM 40 MG: 40 INJECTION, POWDER, FOR SOLUTION INTRAVENOUS at 20:51

## 2019-03-08 RX ADMIN — HEPARIN SODIUM 5000 UNITS: 5000 INJECTION, SOLUTION INTRAVENOUS; SUBCUTANEOUS at 05:59

## 2019-03-08 RX ADMIN — CILOSTAZOL 100 MG: 100 TABLET ORAL at 08:52

## 2019-03-08 ASSESSMENT — PAIN SCALES - GENERAL: PAINLEVEL_OUTOF10: 0

## 2019-03-09 ENCOUNTER — ANESTHESIA EVENT (OUTPATIENT)
Dept: ENDOSCOPY | Age: 84
DRG: 682 | End: 2019-03-09
Payer: MEDICARE

## 2019-03-09 ENCOUNTER — ANESTHESIA (OUTPATIENT)
Dept: ENDOSCOPY | Age: 84
DRG: 682 | End: 2019-03-09
Payer: MEDICARE

## 2019-03-09 VITALS — OXYGEN SATURATION: 98 % | DIASTOLIC BLOOD PRESSURE: 49 MMHG | SYSTOLIC BLOOD PRESSURE: 124 MMHG

## 2019-03-09 LAB
ALBUMIN SERPL-MCNC: 3.5 GM/DL (ref 3.4–5)
ANION GAP SERPL CALCULATED.3IONS-SCNC: 9 MMOL/L (ref 4–16)
BUN BLDV-MCNC: 43 MG/DL (ref 6–23)
CALCIUM SERPL-MCNC: 8.6 MG/DL (ref 8.3–10.6)
CHLORIDE BLD-SCNC: 111 MMOL/L (ref 99–110)
CO2: 27 MMOL/L (ref 21–32)
CREAT SERPL-MCNC: 1.8 MG/DL (ref 0.6–1.1)
GFR AFRICAN AMERICAN: 33 ML/MIN/1.73M2
GFR NON-AFRICAN AMERICAN: 27 ML/MIN/1.73M2
GLUCOSE BLD-MCNC: 89 MG/DL (ref 70–99)
HCT VFR BLD CALC: 30.5 % (ref 37–47)
HEMOGLOBIN: 9.7 GM/DL (ref 12.5–16)
PHOSPHORUS: 3.1 MG/DL (ref 2.5–4.9)
POTASSIUM SERPL-SCNC: 4 MMOL/L (ref 3.5–5.1)
SODIUM BLD-SCNC: 147 MMOL/L (ref 135–145)

## 2019-03-09 PROCEDURE — 2580000003 HC RX 258: Performed by: INTERNAL MEDICINE

## 2019-03-09 PROCEDURE — 6370000000 HC RX 637 (ALT 250 FOR IP): Performed by: INTERNAL MEDICINE

## 2019-03-09 PROCEDURE — 6370000000 HC RX 637 (ALT 250 FOR IP): Performed by: HOSPITALIST

## 2019-03-09 PROCEDURE — 6370000000 HC RX 637 (ALT 250 FOR IP): Performed by: NURSE PRACTITIONER

## 2019-03-09 PROCEDURE — 6360000002 HC RX W HCPCS: Performed by: NURSE ANESTHETIST, CERTIFIED REGISTERED

## 2019-03-09 PROCEDURE — 36415 COLL VENOUS BLD VENIPUNCTURE: CPT

## 2019-03-09 PROCEDURE — 43235 EGD DIAGNOSTIC BRUSH WASH: CPT | Performed by: INTERNAL MEDICINE

## 2019-03-09 PROCEDURE — 99233 SBSQ HOSP IP/OBS HIGH 50: CPT | Performed by: INTERNAL MEDICINE

## 2019-03-09 PROCEDURE — C9113 INJ PANTOPRAZOLE SODIUM, VIA: HCPCS | Performed by: HOSPITALIST

## 2019-03-09 PROCEDURE — 3609012800 HC EGD DIAGNOSTIC ONLY: Performed by: INTERNAL MEDICINE

## 2019-03-09 PROCEDURE — 94761 N-INVAS EAR/PLS OXIMETRY MLT: CPT

## 2019-03-09 PROCEDURE — 85014 HEMATOCRIT: CPT

## 2019-03-09 PROCEDURE — 80069 RENAL FUNCTION PANEL: CPT

## 2019-03-09 PROCEDURE — 2580000003 HC RX 258: Performed by: NURSE ANESTHETIST, CERTIFIED REGISTERED

## 2019-03-09 PROCEDURE — 2500000003 HC RX 250 WO HCPCS: Performed by: NURSE ANESTHETIST, CERTIFIED REGISTERED

## 2019-03-09 PROCEDURE — 2709999900 HC NON-CHARGEABLE SUPPLY: Performed by: INTERNAL MEDICINE

## 2019-03-09 PROCEDURE — 3700000001 HC ADD 15 MINUTES (ANESTHESIA): Performed by: INTERNAL MEDICINE

## 2019-03-09 PROCEDURE — 3700000000 HC ANESTHESIA ATTENDED CARE: Performed by: INTERNAL MEDICINE

## 2019-03-09 PROCEDURE — 6360000002 HC RX W HCPCS: Performed by: HOSPITALIST

## 2019-03-09 PROCEDURE — 1200000000 HC SEMI PRIVATE

## 2019-03-09 PROCEDURE — 85018 HEMOGLOBIN: CPT

## 2019-03-09 PROCEDURE — 0DJ08ZZ INSPECTION OF UPPER INTESTINAL TRACT, VIA NATURAL OR ARTIFICIAL OPENING ENDOSCOPIC: ICD-10-PCS | Performed by: INTERNAL MEDICINE

## 2019-03-09 PROCEDURE — 94150 VITAL CAPACITY TEST: CPT

## 2019-03-09 RX ORDER — LABETALOL HYDROCHLORIDE 5 MG/ML
20 INJECTION, SOLUTION INTRAVENOUS EVERY 6 HOURS PRN
Status: DISCONTINUED | OUTPATIENT
Start: 2019-03-09 | End: 2019-03-11 | Stop reason: HOSPADM

## 2019-03-09 RX ORDER — LIDOCAINE HYDROCHLORIDE 20 MG/ML
INJECTION, SOLUTION INFILTRATION; PERINEURAL PRN
Status: DISCONTINUED | OUTPATIENT
Start: 2019-03-09 | End: 2019-03-09 | Stop reason: SDUPTHER

## 2019-03-09 RX ORDER — SODIUM PHOSPHATE, DIBASIC AND SODIUM PHOSPHATE, MONOBASIC 7; 19 G/133ML; G/133ML
2 ENEMA RECTAL ONCE
Status: COMPLETED | OUTPATIENT
Start: 2019-03-10 | End: 2019-03-10

## 2019-03-09 RX ORDER — SODIUM CHLORIDE 9 MG/ML
INJECTION, SOLUTION INTRAVENOUS CONTINUOUS PRN
Status: DISCONTINUED | OUTPATIENT
Start: 2019-03-09 | End: 2019-03-09 | Stop reason: SDUPTHER

## 2019-03-09 RX ORDER — ONDANSETRON 2 MG/ML
INJECTION INTRAMUSCULAR; INTRAVENOUS PRN
Status: DISCONTINUED | OUTPATIENT
Start: 2019-03-09 | End: 2019-03-09 | Stop reason: SDUPTHER

## 2019-03-09 RX ORDER — PROPOFOL 10 MG/ML
INJECTION, EMULSION INTRAVENOUS PRN
Status: DISCONTINUED | OUTPATIENT
Start: 2019-03-09 | End: 2019-03-09 | Stop reason: SDUPTHER

## 2019-03-09 RX ORDER — DEXAMETHASONE SODIUM PHOSPHATE 4 MG/ML
INJECTION, SOLUTION INTRA-ARTICULAR; INTRALESIONAL; INTRAMUSCULAR; INTRAVENOUS; SOFT TISSUE PRN
Status: DISCONTINUED | OUTPATIENT
Start: 2019-03-09 | End: 2019-03-09 | Stop reason: SDUPTHER

## 2019-03-09 RX ADMIN — PROPOFOL 10 MG: 10 INJECTION, EMULSION INTRAVENOUS at 09:11

## 2019-03-09 RX ADMIN — ONDANSETRON 4 MG: 2 INJECTION INTRAMUSCULAR; INTRAVENOUS at 09:08

## 2019-03-09 RX ADMIN — DOCUSATE SODIUM 100 MG: 100 CAPSULE, LIQUID FILLED ORAL at 10:39

## 2019-03-09 RX ADMIN — SODIUM CHLORIDE: 9 INJECTION, SOLUTION INTRAVENOUS at 03:23

## 2019-03-09 RX ADMIN — PANTOPRAZOLE SODIUM 40 MG: 40 INJECTION, POWDER, FOR SOLUTION INTRAVENOUS at 10:39

## 2019-03-09 RX ADMIN — DOCUSATE SODIUM 100 MG: 100 CAPSULE, LIQUID FILLED ORAL at 20:11

## 2019-03-09 RX ADMIN — PROPOFOL 10 MG: 10 INJECTION, EMULSION INTRAVENOUS at 09:12

## 2019-03-09 RX ADMIN — PROPOFOL 10 MG: 10 INJECTION, EMULSION INTRAVENOUS at 09:13

## 2019-03-09 RX ADMIN — PANTOPRAZOLE SODIUM 40 MG: 40 INJECTION, POWDER, FOR SOLUTION INTRAVENOUS at 20:12

## 2019-03-09 RX ADMIN — SODIUM CHLORIDE: 9 INJECTION, SOLUTION INTRAVENOUS at 14:04

## 2019-03-09 RX ADMIN — LIDOCAINE HYDROCHLORIDE 100 MG: 20 INJECTION, SOLUTION INFILTRATION; PERINEURAL at 09:10

## 2019-03-09 RX ADMIN — CARVEDILOL 3.12 MG: 3.12 TABLET, FILM COATED ORAL at 16:34

## 2019-03-09 RX ADMIN — OXYBUTYNIN CHLORIDE 5 MG: 5 TABLET, EXTENDED RELEASE ORAL at 20:11

## 2019-03-09 RX ADMIN — SODIUM CHLORIDE: 9 INJECTION, SOLUTION INTRAVENOUS at 09:09

## 2019-03-09 RX ADMIN — GABAPENTIN 100 MG: 100 CAPSULE ORAL at 20:11

## 2019-03-09 RX ADMIN — CARVEDILOL 3.12 MG: 3.12 TABLET, FILM COATED ORAL at 10:39

## 2019-03-09 RX ADMIN — DEXAMETHASONE SODIUM PHOSPHATE 8 MG: 4 INJECTION, SOLUTION INTRAMUSCULAR; INTRAVENOUS at 09:08

## 2019-03-09 RX ADMIN — PROPOFOL 40 MG: 10 INJECTION, EMULSION INTRAVENOUS at 09:10

## 2019-03-09 RX ADMIN — POLYETHYLENE GLYCOL 3350, SODIUM SULFATE ANHYDROUS, SODIUM BICARBONATE, SODIUM CHLORIDE, POTASSIUM CHLORIDE 4000 ML: 236; 22.74; 6.74; 5.86; 2.97 POWDER, FOR SOLUTION ORAL at 18:07

## 2019-03-09 ASSESSMENT — PAIN SCALES - GENERAL
PAINLEVEL_OUTOF10: 0
PAINLEVEL_OUTOF10: 0

## 2019-03-10 ENCOUNTER — ANESTHESIA (OUTPATIENT)
Dept: ENDOSCOPY | Age: 84
DRG: 682 | End: 2019-03-10
Payer: MEDICARE

## 2019-03-10 ENCOUNTER — APPOINTMENT (OUTPATIENT)
Dept: GENERAL RADIOLOGY | Age: 84
DRG: 682 | End: 2019-03-10
Payer: MEDICARE

## 2019-03-10 VITALS
RESPIRATION RATE: 10 BRPM | DIASTOLIC BLOOD PRESSURE: 49 MMHG | SYSTOLIC BLOOD PRESSURE: 119 MMHG | OXYGEN SATURATION: 99 %

## 2019-03-10 LAB
ALBUMIN SERPL-MCNC: 3.3 GM/DL (ref 3.4–5)
ANION GAP SERPL CALCULATED.3IONS-SCNC: 9 MMOL/L (ref 4–16)
BUN BLDV-MCNC: 27 MG/DL (ref 6–23)
CALCIUM SERPL-MCNC: 8.1 MG/DL (ref 8.3–10.6)
CHLORIDE BLD-SCNC: 112 MMOL/L (ref 99–110)
CO2: 23 MMOL/L (ref 21–32)
CREAT SERPL-MCNC: 1.5 MG/DL (ref 0.6–1.1)
GFR AFRICAN AMERICAN: 40 ML/MIN/1.73M2
GFR NON-AFRICAN AMERICAN: 33 ML/MIN/1.73M2
GLUCOSE BLD-MCNC: 95 MG/DL (ref 70–99)
PHOSPHORUS: 1.7 MG/DL (ref 2.5–4.9)
POTASSIUM SERPL-SCNC: 4.7 MMOL/L (ref 3.5–5.1)
SODIUM BLD-SCNC: 144 MMOL/L (ref 135–145)

## 2019-03-10 PROCEDURE — G0121 COLON CA SCRN NOT HI RSK IND: HCPCS | Performed by: INTERNAL MEDICINE

## 2019-03-10 PROCEDURE — 3609009500 HC COLONOSCOPY DIAGNOSTIC OR SCREENING: Performed by: INTERNAL MEDICINE

## 2019-03-10 PROCEDURE — 6370000000 HC RX 637 (ALT 250 FOR IP): Performed by: HOSPITALIST

## 2019-03-10 PROCEDURE — 80069 RENAL FUNCTION PANEL: CPT

## 2019-03-10 PROCEDURE — 3700000001 HC ADD 15 MINUTES (ANESTHESIA): Performed by: INTERNAL MEDICINE

## 2019-03-10 PROCEDURE — 3700000000 HC ANESTHESIA ATTENDED CARE: Performed by: INTERNAL MEDICINE

## 2019-03-10 PROCEDURE — 6370000000 HC RX 637 (ALT 250 FOR IP): Performed by: NURSE PRACTITIONER

## 2019-03-10 PROCEDURE — 6370000000 HC RX 637 (ALT 250 FOR IP): Performed by: INTERNAL MEDICINE

## 2019-03-10 PROCEDURE — 2580000003 HC RX 258: Performed by: NURSE PRACTITIONER

## 2019-03-10 PROCEDURE — 2580000003 HC RX 258: Performed by: NURSE ANESTHETIST, CERTIFIED REGISTERED

## 2019-03-10 PROCEDURE — 94761 N-INVAS EAR/PLS OXIMETRY MLT: CPT

## 2019-03-10 PROCEDURE — 6360000002 HC RX W HCPCS: Performed by: NURSE PRACTITIONER

## 2019-03-10 PROCEDURE — 6360000002 HC RX W HCPCS: Performed by: HOSPITALIST

## 2019-03-10 PROCEDURE — 2709999900 HC NON-CHARGEABLE SUPPLY: Performed by: INTERNAL MEDICINE

## 2019-03-10 PROCEDURE — 2700000000 HC OXYGEN THERAPY PER DAY

## 2019-03-10 PROCEDURE — 2500000003 HC RX 250 WO HCPCS: Performed by: NURSE ANESTHETIST, CERTIFIED REGISTERED

## 2019-03-10 PROCEDURE — 6360000002 HC RX W HCPCS: Performed by: INTERNAL MEDICINE

## 2019-03-10 PROCEDURE — 1200000000 HC SEMI PRIVATE

## 2019-03-10 PROCEDURE — C9113 INJ PANTOPRAZOLE SODIUM, VIA: HCPCS | Performed by: HOSPITALIST

## 2019-03-10 PROCEDURE — 71045 X-RAY EXAM CHEST 1 VIEW: CPT

## 2019-03-10 PROCEDURE — 94640 AIRWAY INHALATION TREATMENT: CPT

## 2019-03-10 PROCEDURE — 6360000002 HC RX W HCPCS: Performed by: NURSE ANESTHETIST, CERTIFIED REGISTERED

## 2019-03-10 PROCEDURE — 0DJD8ZZ INSPECTION OF LOWER INTESTINAL TRACT, VIA NATURAL OR ARTIFICIAL OPENING ENDOSCOPIC: ICD-10-PCS | Performed by: INTERNAL MEDICINE

## 2019-03-10 PROCEDURE — 36415 COLL VENOUS BLD VENIPUNCTURE: CPT

## 2019-03-10 RX ORDER — PROPOFOL 10 MG/ML
INJECTION, EMULSION INTRAVENOUS PRN
Status: DISCONTINUED | OUTPATIENT
Start: 2019-03-10 | End: 2019-03-10 | Stop reason: SDUPTHER

## 2019-03-10 RX ORDER — FUROSEMIDE 10 MG/ML
40 INJECTION INTRAMUSCULAR; INTRAVENOUS ONCE
Status: COMPLETED | OUTPATIENT
Start: 2019-03-10 | End: 2019-03-10

## 2019-03-10 RX ORDER — FUROSEMIDE 10 MG/ML
40 INJECTION INTRAMUSCULAR; INTRAVENOUS 2 TIMES DAILY
Status: DISCONTINUED | OUTPATIENT
Start: 2019-03-10 | End: 2019-03-11 | Stop reason: HOSPADM

## 2019-03-10 RX ORDER — LIDOCAINE HYDROCHLORIDE 20 MG/ML
INJECTION, SOLUTION EPIDURAL; INFILTRATION; INTRACAUDAL; PERINEURAL PRN
Status: DISCONTINUED | OUTPATIENT
Start: 2019-03-10 | End: 2019-03-10 | Stop reason: SDUPTHER

## 2019-03-10 RX ORDER — IPRATROPIUM BROMIDE AND ALBUTEROL SULFATE 2.5; .5 MG/3ML; MG/3ML
1 SOLUTION RESPIRATORY (INHALATION)
Status: DISCONTINUED | OUTPATIENT
Start: 2019-03-10 | End: 2019-03-11 | Stop reason: HOSPADM

## 2019-03-10 RX ORDER — SODIUM CHLORIDE 9 MG/ML
INJECTION, SOLUTION INTRAVENOUS CONTINUOUS PRN
Status: DISCONTINUED | OUTPATIENT
Start: 2019-03-10 | End: 2019-03-10 | Stop reason: SDUPTHER

## 2019-03-10 RX ORDER — METHYLPREDNISOLONE SODIUM SUCCINATE 40 MG/ML
40 INJECTION, POWDER, LYOPHILIZED, FOR SOLUTION INTRAMUSCULAR; INTRAVENOUS ONCE
Status: COMPLETED | OUTPATIENT
Start: 2019-03-10 | End: 2019-03-10

## 2019-03-10 RX ADMIN — PROPOFOL 30 MG: 10 INJECTION, EMULSION INTRAVENOUS at 08:12

## 2019-03-10 RX ADMIN — CLOPIDOGREL BISULFATE 75 MG: 75 TABLET ORAL at 09:35

## 2019-03-10 RX ADMIN — IPRATROPIUM BROMIDE AND ALBUTEROL SULFATE 1 AMPULE: .5; 3 SOLUTION RESPIRATORY (INHALATION) at 12:44

## 2019-03-10 RX ADMIN — FUROSEMIDE 40 MG: 10 INJECTION, SOLUTION INTRAMUSCULAR; INTRAVENOUS at 09:34

## 2019-03-10 RX ADMIN — PANTOPRAZOLE SODIUM 40 MG: 40 INJECTION, POWDER, FOR SOLUTION INTRAVENOUS at 09:34

## 2019-03-10 RX ADMIN — PROPOFOL 80 MG: 10 INJECTION, EMULSION INTRAVENOUS at 08:04

## 2019-03-10 RX ADMIN — PROPOFOL 20 MG: 10 INJECTION, EMULSION INTRAVENOUS at 08:21

## 2019-03-10 RX ADMIN — IPRATROPIUM BROMIDE AND ALBUTEROL SULFATE 1 AMPULE: .5; 3 SOLUTION RESPIRATORY (INHALATION) at 09:14

## 2019-03-10 RX ADMIN — METHYLPREDNISOLONE SODIUM SUCCINATE 40 MG: 40 INJECTION, POWDER, FOR SOLUTION INTRAMUSCULAR; INTRAVENOUS at 09:35

## 2019-03-10 RX ADMIN — CARVEDILOL 3.12 MG: 3.12 TABLET, FILM COATED ORAL at 09:35

## 2019-03-10 RX ADMIN — FUROSEMIDE 40 MG: 10 INJECTION, SOLUTION INTRAMUSCULAR; INTRAVENOUS at 17:57

## 2019-03-10 RX ADMIN — GABAPENTIN 100 MG: 100 CAPSULE ORAL at 21:46

## 2019-03-10 RX ADMIN — CARVEDILOL 3.12 MG: 3.12 TABLET, FILM COATED ORAL at 17:57

## 2019-03-10 RX ADMIN — PANTOPRAZOLE SODIUM 40 MG: 40 INJECTION, POWDER, FOR SOLUTION INTRAVENOUS at 21:47

## 2019-03-10 RX ADMIN — DOCUSATE SODIUM 100 MG: 100 CAPSULE, LIQUID FILLED ORAL at 21:46

## 2019-03-10 RX ADMIN — IPRATROPIUM BROMIDE AND ALBUTEROL SULFATE 1 AMPULE: .5; 3 SOLUTION RESPIRATORY (INHALATION) at 21:13

## 2019-03-10 RX ADMIN — OXYBUTYNIN CHLORIDE 5 MG: 5 TABLET, EXTENDED RELEASE ORAL at 21:46

## 2019-03-10 RX ADMIN — SODIUM CHLORIDE: 9 INJECTION, SOLUTION INTRAVENOUS at 08:04

## 2019-03-10 RX ADMIN — SODIUM CHLORIDE, PRESERVATIVE FREE 10 ML: 5 INJECTION INTRAVENOUS at 09:48

## 2019-03-10 RX ADMIN — IPRATROPIUM BROMIDE AND ALBUTEROL SULFATE 1 AMPULE: .5; 3 SOLUTION RESPIRATORY (INHALATION) at 16:57

## 2019-03-10 RX ADMIN — LIDOCAINE HYDROCHLORIDE 100 MG: 20 INJECTION, SOLUTION EPIDURAL; INFILTRATION; INTRACAUDAL; PERINEURAL at 08:04

## 2019-03-10 RX ADMIN — PROPOFOL 30 MG: 10 INJECTION, EMULSION INTRAVENOUS at 08:08

## 2019-03-10 RX ADMIN — ONDANSETRON 4 MG: 2 INJECTION INTRAMUSCULAR; INTRAVENOUS at 10:11

## 2019-03-10 RX ADMIN — SODIUM PHOSPHATE, DIBASIC AND SODIUM PHOSPHATE, MONOBASIC 1 ENEMA: 7; 19 ENEMA RECTAL at 04:20

## 2019-03-10 RX ADMIN — PROPOFOL 40 MG: 10 INJECTION, EMULSION INTRAVENOUS at 08:16

## 2019-03-10 RX ADMIN — CILOSTAZOL 100 MG: 100 TABLET ORAL at 09:58

## 2019-03-10 RX ADMIN — SODIUM CHLORIDE, PRESERVATIVE FREE 10 ML: 5 INJECTION INTRAVENOUS at 21:47

## 2019-03-10 ASSESSMENT — PAIN SCALES - GENERAL: PAINLEVEL_OUTOF10: 0

## 2019-03-11 VITALS
BODY MASS INDEX: 24.41 KG/M2 | SYSTOLIC BLOOD PRESSURE: 144 MMHG | DIASTOLIC BLOOD PRESSURE: 58 MMHG | WEIGHT: 121.1 LBS | HEART RATE: 71 BPM | RESPIRATION RATE: 17 BRPM | TEMPERATURE: 98.7 F | HEIGHT: 59 IN | OXYGEN SATURATION: 91 %

## 2019-03-11 LAB
ALBUMIN SERPL-MCNC: 3.3 GM/DL (ref 3.4–5)
ANION GAP SERPL CALCULATED.3IONS-SCNC: 10 MMOL/L (ref 4–16)
BUN BLDV-MCNC: 29 MG/DL (ref 6–23)
CALCIUM SERPL-MCNC: 8.2 MG/DL (ref 8.3–10.6)
CHLORIDE BLD-SCNC: 105 MMOL/L (ref 99–110)
CO2: 27 MMOL/L (ref 21–32)
CREAT SERPL-MCNC: 1.7 MG/DL (ref 0.6–1.1)
GFR AFRICAN AMERICAN: 35 ML/MIN/1.73M2
GFR NON-AFRICAN AMERICAN: 29 ML/MIN/1.73M2
GLUCOSE BLD-MCNC: 89 MG/DL (ref 70–99)
PHOSPHORUS: 3.3 MG/DL (ref 2.5–4.9)
POTASSIUM SERPL-SCNC: 4 MMOL/L (ref 3.5–5.1)
SODIUM BLD-SCNC: 142 MMOL/L (ref 135–145)

## 2019-03-11 PROCEDURE — 36415 COLL VENOUS BLD VENIPUNCTURE: CPT

## 2019-03-11 PROCEDURE — 6370000000 HC RX 637 (ALT 250 FOR IP): Performed by: NURSE PRACTITIONER

## 2019-03-11 PROCEDURE — 6360000002 HC RX W HCPCS: Performed by: HOSPITALIST

## 2019-03-11 PROCEDURE — 6360000002 HC RX W HCPCS: Performed by: INTERNAL MEDICINE

## 2019-03-11 PROCEDURE — 2580000003 HC RX 258: Performed by: NURSE PRACTITIONER

## 2019-03-11 PROCEDURE — C9113 INJ PANTOPRAZOLE SODIUM, VIA: HCPCS | Performed by: HOSPITALIST

## 2019-03-11 PROCEDURE — 80069 RENAL FUNCTION PANEL: CPT

## 2019-03-11 RX ADMIN — SODIUM CHLORIDE, PRESERVATIVE FREE 10 ML: 5 INJECTION INTRAVENOUS at 08:27

## 2019-03-11 RX ADMIN — CLOPIDOGREL BISULFATE 75 MG: 75 TABLET ORAL at 08:17

## 2019-03-11 RX ADMIN — CILOSTAZOL 100 MG: 100 TABLET ORAL at 08:17

## 2019-03-11 RX ADMIN — DOCUSATE SODIUM 100 MG: 100 CAPSULE, LIQUID FILLED ORAL at 08:17

## 2019-03-11 RX ADMIN — PANTOPRAZOLE SODIUM 40 MG: 40 INJECTION, POWDER, FOR SOLUTION INTRAVENOUS at 08:27

## 2019-03-11 RX ADMIN — FUROSEMIDE 40 MG: 10 INJECTION, SOLUTION INTRAMUSCULAR; INTRAVENOUS at 08:27

## 2019-03-11 RX ADMIN — SODIUM CHLORIDE, PRESERVATIVE FREE 10 ML: 5 INJECTION INTRAVENOUS at 08:20

## 2019-03-11 RX ADMIN — CARVEDILOL 3.12 MG: 3.12 TABLET, FILM COATED ORAL at 08:17

## 2019-03-11 ASSESSMENT — PAIN SCALES - GENERAL
PAINLEVEL_OUTOF10: 0

## 2019-03-19 LAB
EKG ATRIAL RATE: 41 BPM
EKG DIAGNOSIS: NORMAL
EKG Q-T INTERVAL: 414 MS
EKG QRS DURATION: 80 MS
EKG QTC CALCULATION (BAZETT): 440 MS
EKG R AXIS: 66 DEGREES
EKG T AXIS: 98 DEGREES
EKG VENTRICULAR RATE: 68 BPM

## 2019-03-20 ENCOUNTER — OFFICE VISIT (OUTPATIENT)
Dept: INTERNAL MEDICINE CLINIC | Age: 84
End: 2019-03-20
Payer: MEDICARE

## 2019-03-20 VITALS
RESPIRATION RATE: 14 BRPM | DIASTOLIC BLOOD PRESSURE: 52 MMHG | HEART RATE: 65 BPM | BODY MASS INDEX: 24.04 KG/M2 | SYSTOLIC BLOOD PRESSURE: 140 MMHG | WEIGHT: 119 LBS | OXYGEN SATURATION: 98 %

## 2019-03-20 DIAGNOSIS — K21.9 GASTROESOPHAGEAL REFLUX DISEASE WITHOUT ESOPHAGITIS: ICD-10-CM

## 2019-03-20 DIAGNOSIS — K63.5 POLYP OF COLON, UNSPECIFIED PART OF COLON, UNSPECIFIED TYPE: ICD-10-CM

## 2019-03-20 DIAGNOSIS — N18.9 CHRONIC KIDNEY DISEASE, UNSPECIFIED CKD STAGE: ICD-10-CM

## 2019-03-20 DIAGNOSIS — K62.5 RECTAL BLEEDING: ICD-10-CM

## 2019-03-20 DIAGNOSIS — D64.9 ANEMIA, UNSPECIFIED TYPE: Primary | ICD-10-CM

## 2019-03-20 PROCEDURE — 99214 OFFICE O/P EST MOD 30 MIN: CPT | Performed by: FAMILY MEDICINE

## 2019-03-20 ASSESSMENT — ENCOUNTER SYMPTOMS
COUGH: 0
SHORTNESS OF BREATH: 0
NAUSEA: 0
BLOOD IN STOOL: 0
ABDOMINAL PAIN: 0
BACK PAIN: 0

## 2019-03-22 ENCOUNTER — HOSPITAL ENCOUNTER (OUTPATIENT)
Age: 84
Discharge: HOME | End: 2019-03-22
Payer: MEDICARE

## 2019-03-22 LAB
ANION GAP SERPL CALCULATED.3IONS-SCNC: 12 MMOL/L (ref 4–16)
BUN BLDV-MCNC: 47 MG/DL (ref 6–23)
CALCIUM SERPL-MCNC: 9.3 MG/DL (ref 8.3–10.6)
CHLORIDE BLD-SCNC: 94 MMOL/L (ref 99–110)
CO2: 35 MMOL/L (ref 21–32)
CREAT SERPL-MCNC: 1.9 MG/DL (ref 0.6–1.1)
GFR AFRICAN AMERICAN: 31 ML/MIN/1.73M2
GFR NON-AFRICAN AMERICAN: 25 ML/MIN/1.73M2
GLUCOSE BLD-MCNC: 81 MG/DL (ref 70–99)
MAGNESIUM: 2.2 MG/DL (ref 1.8–2.4)
POTASSIUM SERPL-SCNC: 4 MMOL/L (ref 3.5–5.1)
SODIUM BLD-SCNC: 141 MMOL/L (ref 135–145)

## 2019-03-22 PROCEDURE — 36415 COLL VENOUS BLD VENIPUNCTURE: CPT

## 2019-03-22 PROCEDURE — 80048 BASIC METABOLIC PNL TOTAL CA: CPT

## 2019-03-22 PROCEDURE — 83735 ASSAY OF MAGNESIUM: CPT

## 2019-04-15 ENCOUNTER — ANESTHESIA EVENT (OUTPATIENT)
Dept: ENDOSCOPY | Age: 84
End: 2019-04-15
Payer: MEDICARE

## 2019-04-15 NOTE — ANESTHESIA PRE PROCEDURE
stage G1/A3, glomerular filtration rate (GFR) equal to or greater than 90 mL/min/1.73 square meter and albuminuria creatinine ratio greater than 300 mg/g N18.1    Flash pulmonary edema (HCC) J81.0    Acute respiratory failure with hypoxia (HCC) J96.01    Acute kidney failure (HCC) N17.9    Valvular disease I38    Acute kidney injury ([facility] [address] N17.9    Chronic kidney disease (CKD) stage G3b/A3, moderately decreased glomerular filtration rate (GFR) between 30-44 mL/min/1.73 square meter and albuminuria creatinine ratio greater than 300 mg/g ([facility]) N18.3    CASS (acute kidney injury) ([address] N17.9    Iron deficiency anemia D50.9    Gastrointestinal hemorrhage associated with anorectal source K62.5       Past Medical History:        Diagnosis Date    Acute renal failure ([address] 4/17/2017    Acute respiratory failure ([address] 11/2018    Anemia, unspecified     CAD (coronary artery disease)     Carotid stenosis, bilateral 11/2018    Severe    Chronic diarrhea     Chronic kidney disease     Gastroesophageal reflux disease without esophagitis     Hyperlipidemia     Hypertension     Irritable bowel disease     Left carotid bruit 11/21/2014    Localized edema     Nicotine dependence, other tobacco product, uncomplicated     Osteoporosis     Proteinuria, unspecified     PVD (peripheral vascular disease) ([address]     Severe mitral regurgitation 11/2018    Solitary cyst of left breast     Subclavian steal syndrome 11/21/2014    Right side    TIA (transient ischemic attack)     Unspecified asthma, uncomplicated     Unspecified osteoarthritis, unspecified site     Unspecified urinary incontinence        Past Surgical History:        Procedure Laterality Date    AORTO-FEMORAL [facility]PASS GRAFT Left 1970[facility]    BRAIN ANEURYSM SURGERY  2005    coil-Left parasellar- [facility]    BRAIN SURGERY      CARDIAC CATHETERIZATION      CHOLECYSTECTOMY      COLONOSCOPY N/A 3/10/2019    COLONOSCOPY DIAGNOSTIC WITH IDENTIFICATION OF POLYPS performed by Norma Gruber MD at [address] [place] [address]  2018    UPPER GASTROINTESTINAL ENDOSCOPY N/A 3/9/2019    EGD DIAGNOSTIC ONLY performed by Norma Gruber MD at [address] [place] [address] History:    Social History     Tobacco Use    Smoking status: Former Smoker     Packs/day: 1.00     Years: 65.00     Pack years: 65.00     Last attempt to quit: 2018     Years since quittin.4    Smokeless tobacco: Never Used   Substance Use Topics    Alcohol use: No                                Counseling given: Not Answered      Vital Signs (Current): There were no vitals filed for this visit. BP Readings from Last 3 Encounters:   19 (!) 150/48   19 (!) 140/52   19 (!) 144/58       NPO Status:                        12 hrs. BMI:   Wt Readings from Last 3 Encounters:   19 120 lb 6.4 oz (54.6 kg)   19 119 lb (54 kg)   19 119 lb (54 kg)     There is no height or weight on file to calculate BMI.    CBC:   Lab Results   Component Value Date    WBC 7.1 2019    RBC 2.53 2019    HGB 9.7 2019    HCT 30.5 2019    MCV 98.0 2019    RDW 12.8 2019     2019       CMP:   Lab Results   Component Value Date     2019    K 4.0 2019    CL 94 2019    CO2 35 2019    BUN 47 2019    CREATININE 1.9 2019    GFRAA 31 2019    LABGLOM 25 2019    GLUCOSE 81 2019    PROT 5.8 2019    PROT 6.3 11/10/2012    CALCIUM 9.3 2019    BILITOT 0.2 2019    ALKPHOS 59 2019    AST 14 2019    ALT <5 2019       POC Tests: No results for input(s): POCGLU, POCNA, POCK, POCCL, POCBUN, POCHEMO, POCHCT in the last 72 hours.     Coags:   Lab Results   Component Value Date    PROTIME 12.6 2018    PROTIME 11.8 2011    INR 1.11 2018    APTT 33.1 2018       HCG (If Applicable): No results found for: PREGTESTUR, PREGSERUM, HCG, HCGQUANT     ABGs:   Lab Results   Component Value Date    PO2ART 62 2018    UVW9DBE 39.0 2018    SCP3GHR 27.7 2018        Type & Screen (If Applicable):  No results found for: [facility]    Anesthesia Evaluation  Patient summary reviewed and Nursing notes reviewed   history of anesthetic complications: PONV. Airway: Mallampati: II  TM distance: >3 FB   Neck ROM: full  Mouth opening: > = 3 FB Dental:    (+) upper dentures and lower dentures      Pulmonary:normal exam    (+) COPD:  asthma:                           ROS comment: Former Smoker - 72 pack years  Quit Smokin18       Cardiovascular:  Exercise tolerance: good (>4 METS),   (+) hypertension:, CAD:, CHF ( states diastolic is usually >149): diastolic, hyperlipidemia      ECG reviewed  Rhythm: regular  Rate: normal  Echocardiogram reviewed         Beta Blocker:  Dose within 24 Hrs      ROS comment: Echo 2018:  Summary   Left ventricular function is normal , EF is estimated at 50-%.   Moderately dilated left atrium.   Aortic valve is heavily calcified and appears be slightly stenotic.   Mitral valve is heavily calcified and appears to be stenotic.   Moderate to Severe mitral regurgitation is present.   Moderate tricuspid regurgitation.  Rebecca Sans is no evidence of thrombus in the Left Atrial Appendage. ECG:  Normal sinus rhythm premature atrial complexes   Moderate voltage criteria for LVH, may be normal variant   ST & T wave abnormality, consider lateral ischemia   Abnormal ECG     PE comment: T wave abnormality noted that is consistent with preop ECG   Neuro/Psych:   (+) CVA: residual symptoms, TIA,             GI/Hepatic/Renal:   (+) hiatal hernia, GERD:, renal disease: CRI,          ROS comment: Chronic kidney disease .    Endo/Other: Negative Endo/Other ROS                    Abdominal:           Vascular:   + PVD, aortic or cerebral, . Anesthesia Plan      MAC     ASA 4       Induction: intravenous. Anesthetic plan and risks discussed with patient. Plan discussed with CRNA. Attending anesthesiologist reviewed and agrees with Pre Eval content            AKIL Smalls CRNA   4/15/2019    Pre Anesthesia Evaluation complete. Anesthesia plan, risks, benefits, alternatives, and personnel discussed with patient and/or legal guardian. Patient and/or legal guardian verbalized an understanding and agreed to proceed. Anesthesia plan discussed with care team members and agreed upon.   AKIL Smalls CRNA  4/15/2019

## 2019-04-17 ENCOUNTER — HOSPITAL ENCOUNTER (OUTPATIENT)
Age: 84
Setting detail: OUTPATIENT SURGERY
Discharge: HOME | End: 2019-04-17
Attending: INTERNAL MEDICINE | Admitting: INTERNAL MEDICINE
Payer: MEDICARE

## 2019-04-17 ENCOUNTER — ANESTHESIA (OUTPATIENT)
Dept: ENDOSCOPY | Age: 84
End: 2019-04-17
Payer: MEDICARE

## 2019-04-17 VITALS
OXYGEN SATURATION: 98 % | HEART RATE: 67 BPM | TEMPERATURE: 98.1 F | DIASTOLIC BLOOD PRESSURE: 82 MMHG | RESPIRATION RATE: 16 BRPM | WEIGHT: 120 LBS | BODY MASS INDEX: 23.56 KG/M2 | SYSTOLIC BLOOD PRESSURE: 184 MMHG | HEIGHT: 60 IN

## 2019-04-17 VITALS — SYSTOLIC BLOOD PRESSURE: 150 MMHG | DIASTOLIC BLOOD PRESSURE: 70 MMHG | OXYGEN SATURATION: 99 %

## 2019-04-17 LAB
ANION GAP SERPL CALCULATED.3IONS-SCNC: 12 MMOL/L (ref 4–16)
BUN BLDV-MCNC: 42 MG/DL (ref 6–23)
CALCIUM SERPL-MCNC: 9.3 MG/DL (ref 8.3–10.6)
CHLORIDE BLD-SCNC: 99 MMOL/L (ref 99–110)
CO2: 31 MMOL/L (ref 21–32)
CREAT SERPL-MCNC: 1.7 MG/DL (ref 0.6–1.1)
GFR AFRICAN AMERICAN: 35 ML/MIN/1.73M2
GFR NON-AFRICAN AMERICAN: 29 ML/MIN/1.73M2
GLUCOSE BLD-MCNC: 108 MG/DL (ref 70–99)
POTASSIUM SERPL-SCNC: 3.1 MMOL/L (ref 3.5–5.1)
SODIUM BLD-SCNC: 142 MMOL/L (ref 135–145)

## 2019-04-17 PROCEDURE — 7100000010 HC PHASE II RECOVERY - FIRST 15 MIN: Performed by: INTERNAL MEDICINE

## 2019-04-17 PROCEDURE — 2709999900 HC NON-CHARGEABLE SUPPLY: Performed by: INTERNAL MEDICINE

## 2019-04-17 PROCEDURE — 2500000003 HC RX 250 WO HCPCS: Performed by: NURSE ANESTHETIST, CERTIFIED REGISTERED

## 2019-04-17 PROCEDURE — 80048 BASIC METABOLIC PNL TOTAL CA: CPT

## 2019-04-17 PROCEDURE — 6360000002 HC RX W HCPCS

## 2019-04-17 PROCEDURE — 6360000002 HC RX W HCPCS: Performed by: NURSE ANESTHETIST, CERTIFIED REGISTERED

## 2019-04-17 PROCEDURE — 7100000011 HC PHASE II RECOVERY - ADDTL 15 MIN: Performed by: INTERNAL MEDICINE

## 2019-04-17 PROCEDURE — 3700000000 HC ANESTHESIA ATTENDED CARE: Performed by: INTERNAL MEDICINE

## 2019-04-17 PROCEDURE — 3700000001 HC ADD 15 MINUTES (ANESTHESIA): Performed by: INTERNAL MEDICINE

## 2019-04-17 PROCEDURE — 3609010600 HC COLONOSCOPY POLYPECTOMY SNARE/COLD BIOPSY: Performed by: INTERNAL MEDICINE

## 2019-04-17 PROCEDURE — 2580000003 HC RX 258: Performed by: ANESTHESIOLOGY

## 2019-04-17 PROCEDURE — 88305 TISSUE EXAM BY PATHOLOGIST: CPT

## 2019-04-17 RX ORDER — CEFAZOLIN SODIUM 1 G/50ML
INJECTION, SOLUTION INTRAVENOUS
Status: COMPLETED
Start: 2019-04-17 | End: 2019-04-17

## 2019-04-17 RX ORDER — CEFAZOLIN SODIUM 1 G/50ML
1 INJECTION, SOLUTION INTRAVENOUS ONCE
Status: COMPLETED | OUTPATIENT
Start: 2019-04-17 | End: 2019-04-17

## 2019-04-17 RX ORDER — PROPOFOL 10 MG/ML
INJECTION, EMULSION INTRAVENOUS PRN
Status: DISCONTINUED | OUTPATIENT
Start: 2019-04-17 | End: 2019-04-17 | Stop reason: SDUPTHER

## 2019-04-17 RX ORDER — SODIUM CHLORIDE, SODIUM LACTATE, POTASSIUM CHLORIDE, CALCIUM CHLORIDE 600; 310; 30; 20 MG/100ML; MG/100ML; MG/100ML; MG/100ML
INJECTION, SOLUTION INTRAVENOUS CONTINUOUS
Status: DISCONTINUED | OUTPATIENT
Start: 2019-04-17 | End: 2019-04-17 | Stop reason: HOSPADM

## 2019-04-17 RX ORDER — CEFAZOLIN SODIUM 1 G/3ML
1 INJECTION, POWDER, FOR SOLUTION INTRAMUSCULAR; INTRAVENOUS ONCE
Status: DISCONTINUED | OUTPATIENT
Start: 2019-04-17 | End: 2019-04-17 | Stop reason: CLARIF

## 2019-04-17 RX ORDER — LIDOCAINE HYDROCHLORIDE 20 MG/ML
INJECTION, SOLUTION INFILTRATION; PERINEURAL PRN
Status: DISCONTINUED | OUTPATIENT
Start: 2019-04-17 | End: 2019-04-17 | Stop reason: SDUPTHER

## 2019-04-17 RX ADMIN — CEFAZOLIN SODIUM 1 G: 1 INJECTION, SOLUTION INTRAVENOUS at 10:40

## 2019-04-17 RX ADMIN — PROPOFOL 20 MG: 10 INJECTION, EMULSION INTRAVENOUS at 13:05

## 2019-04-17 RX ADMIN — PROPOFOL 20 MG: 10 INJECTION, EMULSION INTRAVENOUS at 13:09

## 2019-04-17 RX ADMIN — PROPOFOL 20 MG: 10 INJECTION, EMULSION INTRAVENOUS at 13:00

## 2019-04-17 RX ADMIN — PROPOFOL 20 MG: 10 INJECTION, EMULSION INTRAVENOUS at 13:07

## 2019-04-17 RX ADMIN — PROPOFOL 20 MG: 10 INJECTION, EMULSION INTRAVENOUS at 13:10

## 2019-04-17 RX ADMIN — LIDOCAINE HYDROCHLORIDE 100 MG: 20 INJECTION, SOLUTION INFILTRATION; PERINEURAL at 12:57

## 2019-04-17 RX ADMIN — SODIUM CHLORIDE, POTASSIUM CHLORIDE, SODIUM LACTATE AND CALCIUM CHLORIDE: 600; 310; 30; 20 INJECTION, SOLUTION INTRAVENOUS at 10:18

## 2019-04-17 RX ADMIN — PROPOFOL 20 MG: 10 INJECTION, EMULSION INTRAVENOUS at 13:03

## 2019-04-17 RX ADMIN — PROPOFOL 20 MG: 10 INJECTION, EMULSION INTRAVENOUS at 13:21

## 2019-04-17 RX ADMIN — PROPOFOL 30 MG: 10 INJECTION, EMULSION INTRAVENOUS at 13:25

## 2019-04-17 RX ADMIN — PROPOFOL 20 MG: 10 INJECTION, EMULSION INTRAVENOUS at 13:17

## 2019-04-17 RX ADMIN — PROPOFOL 40 MG: 10 INJECTION, EMULSION INTRAVENOUS at 12:57

## 2019-04-17 RX ADMIN — PROPOFOL 20 MG: 10 INJECTION, EMULSION INTRAVENOUS at 13:13

## 2019-04-17 ASSESSMENT — PAIN - FUNCTIONAL ASSESSMENT: PAIN_FUNCTIONAL_ASSESSMENT: 0-10

## 2019-04-17 NOTE — BRIEF OP NOTE
Brief Postoperative Note  ______________________________________________________________    Patient: Marta Contreras  YOB: 1935  MRN: [id number]  Date of Procedure: 4/17/2019    Pre-Op Diagnosis: HX OF COLON POLYPS    Post-Op Diagnosis: diminutive polyp cecum.  Polyp hepatic flexure       Procedure(s):  COLONOSCOPY DIAGNOSTIC PEDI    Anesthesia: Monitor Anesthesia Care    Surgeon(s):  Alvin Salazar MD      Estimated Blood Loss (mL): less than 50     Complications: None    Specimens:   ID Type Source Tests Collected by Time Destination   A : hepatic flexure colon polyp Tissue Colon SURGICAL PATHOLOGY Alvin Salazar MD 4/17/2019 1310          [place], [place]  Date: 4/17/2019  Time: 1:38 PM

## 2019-04-17 NOTE — H&P
I have examined the patient immediately before the procedure and there is no change in the previous history or physical exam.There is no history of sleep apnea, snoring, or stridor. There has been {Numbers; H/IY:43417}  previous adverse experience with sedation/anesthesia.   ASA Class: {NUMBERS 4  AIRWAY Class: 4

## 2019-04-17 NOTE — ANESTHESIA POSTPROCEDURE EVALUATION
Department of Anesthesiology  Postprocedure Note    Patient: Abilio Watts  MRN: [id number]  YOB: 1935  Date of evaluation: 4/17/2019  Time:  1:40 PM     Procedure Summary     Date:  04/17/19 Room / Location:  [address] [place] [address] ENDO 01 / [facility] ENDOSCOPY    Anesthesia Start:  1250 Anesthesia Stop:  3910    Procedure:  COLONOSCOPY DIAGNOSTIC PEDI (N/A ) Diagnosis:  (HX OF COLON POLYPS)    Surgeon:  Shannon Henley MD Responsible Provider:  Patdimas Rachna, DO    Anesthesia Type:  MAC ASA Status:  4          Anesthesia Type: MAC    Ankush Phase I:      Ankush Phase II:      Last vitals: Reviewed and per EMR flowsheets.        Anesthesia Post Evaluation    Patient location during evaluation: bedside  Patient participation: complete - patient participated  Level of consciousness: awake and alert  Pain score: 0  Airway patency: patent  Nausea & Vomiting: no nausea and no vomiting  Complications: no  Cardiovascular status: blood pressure returned to baseline and hemodynamically stable  Respiratory status: acceptable, room air, nonlabored ventilation and spontaneous ventilation  Hydration status: stable

## 2019-04-17 NOTE — PROGRESS NOTES
Pt awake and alert, discharge instructions given to pt and daughter, both voiced understanding. Pt discharged per w/c with daughter.

## 2019-05-01 ENCOUNTER — OFFICE VISIT (OUTPATIENT)
Dept: INTERNAL MEDICINE CLINIC | Age: 84
End: 2019-05-01
Payer: MEDICARE

## 2019-05-01 VITALS
RESPIRATION RATE: 14 BRPM | DIASTOLIC BLOOD PRESSURE: 84 MMHG | HEART RATE: 53 BPM | HEIGHT: 60 IN | BODY MASS INDEX: 23.48 KG/M2 | WEIGHT: 119.6 LBS | OXYGEN SATURATION: 97 % | SYSTOLIC BLOOD PRESSURE: 136 MMHG

## 2019-05-01 DIAGNOSIS — G62.9 PERIPHERAL POLYNEUROPATHY: ICD-10-CM

## 2019-05-01 DIAGNOSIS — E87.6 HYPOKALEMIA: ICD-10-CM

## 2019-05-01 DIAGNOSIS — K21.9 GASTROESOPHAGEAL REFLUX DISEASE WITHOUT ESOPHAGITIS: Primary | ICD-10-CM

## 2019-05-01 PROCEDURE — 99213 OFFICE O/P EST LOW 20 MIN: CPT | Performed by: FAMILY MEDICINE

## 2019-05-02 ENCOUNTER — HOSPITAL ENCOUNTER (OUTPATIENT)
Age: 84
Discharge: HOME | End: 2019-05-02
Payer: MEDICARE

## 2019-05-02 DIAGNOSIS — E87.6 HYPOKALEMIA: ICD-10-CM

## 2019-05-02 LAB — POTASSIUM SERPL-SCNC: 3.5 MMOL/L (ref 3.5–5.1)

## 2019-05-02 PROCEDURE — 36415 COLL VENOUS BLD VENIPUNCTURE: CPT

## 2019-05-02 PROCEDURE — 84132 ASSAY OF SERUM POTASSIUM: CPT

## 2019-05-05 ASSESSMENT — ENCOUNTER SYMPTOMS
SHORTNESS OF BREATH: 0
ABDOMINAL PAIN: 0
NAUSEA: 0
BLOOD IN STOOL: 0
BACK PAIN: 1
COUGH: 0
CHEST TIGHTNESS: 0

## 2019-05-06 NOTE — PROGRESS NOTES
Ofelia Pruitt  1935  82 y.o.  female    SUBJECTIVE:    History of Present Illness  Ofelia Pruitt is a 82 y.o. female who presents today for GERD and peripheral neuropathy. Last labs reviewed. No Cp or Sob. Last labs revealed low K. Will recheck  GERD- Stable on Nexium. She does not want to change  -P. Neuropathy- Stable on Gabapentin  HTN/CKD- managed by nephrology    Review of Systems   Constitutional: Negative for chills, diaphoresis and fever. HENT: Negative. Respiratory: Negative for cough, chest tightness and shortness of breath. Cardiovascular: Negative for chest pain and palpitations. Gastrointestinal: Negative for abdominal pain, blood in stool and nausea. Genitourinary: Negative for difficulty urinating. Musculoskeletal: Positive for back pain (chronic-stable). Neurological: Positive for numbness (feet). Negative for dizziness, light-headedness and headaches.        Allergies   Allergen Reactions    Lipitor [Atorvastatin]     Sulfa Antibiotics Hives    Zocor [Simvastatin]        Past Medical History:   Diagnosis Date    Acute renal failure ([facility] 4/17/2017    Acute respiratory failure ([facility] 11/2018    Anemia     per old chart Hgb 7.5 on 3/8/2019( had transfusion)    Anemia, unspecified     CAD (coronary artery disease)     Carotid stenosis, bilateral 11/2018    Severe    Cerebral artery occlusion with cerebral infarction [facility])     \"after cath, had slight stroke or TIA and affected my vision, had double vision lasted approx 24 hours and no trouble since then\"    CHF (congestive heart failure) ([facility]     \"get pulmonary edema from a bad heart valve and CHF\" follow with Dr Lawyer Lao    Chronic diarrhea     Chronic kidney disease     \"follow with Dr Yamila Garcia" \"creatine level goes up and down\"    COPD (chronic obstructive pulmonary disease) ([facility]     follows with Dr Meño Marsh Flash pulmonary edema ([facility] 11/2018    \"have hx of this so they have to watch the IV fluid\"    Full dentures     Gastroesophageal reflux disease without esophagitis     Hiatal hernia     History of blood transfusion     3/2019    Hyperlipidemia     Hypertension     Irritable bowel disease     \"better now\"    Left carotid bruit 2014    Localized edema     Low back pain     \"for yrs- wear a back brace when I do anything for support\"    Nicotine dependence, other tobacco product, uncomplicated     On home oxygen therapy     2l/nc just at night     Osteoporosis     PONV (postoperative nausea and vomiting)     hx of motion sickness    Proteinuria, unspecified     PVD (peripheral vascular disease) ([address]     Severe mitral regurgitation 2018    Solitary cyst of left breast     Subclavian steal syndrome 2014    Right side    TIA (transient ischemic attack)     Unspecified asthma, uncomplicated     ( pt stated does not have asthma- with phone assess 2019)    Unspecified osteoarthritis, unspecified site     Unspecified urinary incontinence     Wears glasses     Wears glasses        Past Surgical History:   Procedure Laterality Date    AORTO-FEMORAL BYPASS GRAFT Left     [facility]    BRAIN ANEURYSM SURGERY      coil-Left parasellar- [facility]    BRAIN SURGERY      CARDIAC CATHETERIZATION      CHOLECYSTECTOMY      COLONOSCOPY N/A 3/10/2019    COLONOSCOPY DIAGNOSTIC WITH IDENTIFICATION OF POLYPS performed by Fernanda Branham MD at [place] [address] N/A 2019    COLONOSCOPY DIAGNOSTIC PEDI performed by Jeannie Barry MD at [address] [place] [address]  2018    UPPER GASTROINTESTINAL ENDOSCOPY N/A 3/9/2019    EGD DIAGNOSTIC ONLY performed by Fernanda Branham MD at [facility] ENDOSCOPY       Social History     Tobacco Use    Smoking status: Former Smoker     Packs/day: 1.00     Years: 65.00     Pack years: 65.00     Types: Cigarettes     Last attempt to quit: 2018     Years since quittin.4    Smokeless tobacco: Never Used   Substance Use Topics    Alcohol use: No    Drug use: No       Current Outpatient Medications   Medication Sig Dispense Refill    amLODIPine (NORVASC) 2.5 MG tablet Take 1 tablet by mouth daily (Patient taking differently: Take 2.5 mg by mouth nightly ) 90 tablet 1    gabapentin (NEURONTIN) 100 MG capsule Take 100 mg by mouth nightly.  torsemide (DEMADEX) 10 MG tablet Take 2 tablets by mouth 2 times daily 60 tablet 3    pravastatin (PRAVACHOL) 40 MG tablet Take 1 tablet by mouth daily (Patient taking differently: Take 10 mg by mouth daily ) 30 tablet 0    carvedilol (COREG) 3.125 MG tablet Take 1 tablet by mouth 2 times daily (with meals) 60 tablet 0    albuterol sulfate HFA (PROAIR HFA) 108 (90 Base) MCG/ACT inhaler Inhale 2 puffs into the lungs every 6 hours as needed for Wheezing 1 Inhaler 0    Calcium Carb-Cholecalciferol (CALCIUM + D3 PO) Take 1,200 mg by mouth daily      acetaminophen (TYLENOL ARTHRITIS PAIN) 650 MG extended release tablet Take 1,300 mg by mouth as needed for Pain      Multiple Vitamins-Minerals (MULTIVITAMIN ADULT PO) Take by mouth daily      Esomeprazole Magnesium (NEXIUM PO) Take 40 mg by mouth daily       clopidogrel (PLAVIX) 75 MG tablet Take 75 mg by mouth daily.  Cilostazol (PLETAL PO) Take 100 mg by mouth daily       oxybutynin (DITROPAN-XL) 5 MG extended release tablet TAKE 1 TABLET BY MOUTH ONE TIME A DAY  11     No current facility-administered medications for this visit. OBJECTIVE:    /84 (Site: Left Upper Arm, Position: Sitting, Cuff Size: Medium Adult)   Pulse 53   Resp 14   Ht 5' (1.524 m)   Wt 119 lb 9.6 oz (54.3 kg)   SpO2 97%   BMI 23.36 kg/m²     Physical Exam   Constitutional: She is oriented to person, place, and time. She appears well-developed. No distress. Eyes: Conjunctivae are normal.   Neck: Neck supple. Cardiovascular: Regular rhythm. Bradycardia present. Murmur (chronic) heard.   Pulmonary/Chest: Effort normal and breath sounds

## 2019-05-14 ENCOUNTER — HOSPITAL ENCOUNTER (OUTPATIENT)
Age: 84
Discharge: HOME | End: 2019-05-14
Payer: MEDICARE

## 2019-05-14 LAB
BASOPHILS ABSOLUTE: 0.1 K/CU MM
BASOPHILS RELATIVE PERCENT: 0.9 % (ref 0–1)
CEA: 1 NG/ML
DIFFERENTIAL TYPE: ABNORMAL
EOSINOPHILS ABSOLUTE: 0.5 K/CU MM
EOSINOPHILS RELATIVE PERCENT: 5.2 % (ref 0–3)
HCT VFR BLD CALC: 31.8 % (ref 37–47)
HEMOGLOBIN: 9.9 GM/DL (ref 12.5–16)
IMMATURE NEUTROPHIL %: 0.3 % (ref 0–0.43)
LYMPHOCYTES ABSOLUTE: 2 K/CU MM
LYMPHOCYTES RELATIVE PERCENT: 22.1 % (ref 24–44)
MCH RBC QN AUTO: 28.4 PG (ref 27–31)
MCHC RBC AUTO-ENTMCNC: 31.1 % (ref 32–36)
MCV RBC AUTO: 91.4 FL (ref 78–100)
MONOCYTES ABSOLUTE: 0.9 K/CU MM
MONOCYTES RELATIVE PERCENT: 10.5 % (ref 0–4)
NUCLEATED RBC %: 0 %
PDW BLD-RTO: 14.6 % (ref 11.7–14.9)
PLATELET # BLD: 406 K/CU MM (ref 140–440)
PMV BLD AUTO: 10.4 FL (ref 7.5–11.1)
RBC # BLD: 3.48 M/CU MM (ref 4.2–5.4)
SEGMENTED NEUTROPHILS ABSOLUTE COUNT: 5.5 K/CU MM
SEGMENTED NEUTROPHILS RELATIVE PERCENT: 61 % (ref 36–66)
TOTAL IMMATURE NEUTOROPHIL: 0.03 K/CU MM
TOTAL NUCLEATED RBC: 0 K/CU MM
WBC # BLD: 9 K/CU MM (ref 4–10.5)

## 2019-05-14 PROCEDURE — 82378 CARCINOEMBRYONIC ANTIGEN: CPT

## 2019-05-14 PROCEDURE — 85025 COMPLETE CBC W/AUTO DIFF WBC: CPT

## 2019-05-14 PROCEDURE — 36415 COLL VENOUS BLD VENIPUNCTURE: CPT

## 2019-06-03 ENCOUNTER — TELEPHONE (OUTPATIENT)
Dept: INTERNAL MEDICINE CLINIC | Age: 84
End: 2019-06-03

## 2019-06-03 DIAGNOSIS — Z12.31 SCREENING MAMMOGRAM, ENCOUNTER FOR: Primary | ICD-10-CM

## 2019-06-03 NOTE — TELEPHONE ENCOUNTER
Spoke with patient, she is aware mammogram has been ordered and she will call central scheduling to schedule.

## 2019-07-01 ENCOUNTER — HOSPITAL ENCOUNTER (OUTPATIENT)
Dept: WOMENS IMAGING | Age: 84
Discharge: HOME | End: 2019-07-01
Payer: MEDICARE

## 2019-07-01 DIAGNOSIS — Z12.31 SCREENING MAMMOGRAM, ENCOUNTER FOR: ICD-10-CM

## 2019-07-01 PROCEDURE — 77063 BREAST TOMOSYNTHESIS BI: CPT

## 2019-07-05 ENCOUNTER — HOSPITAL ENCOUNTER (OUTPATIENT)
Dept: WOMENS IMAGING | Age: 84
Discharge: HOME | End: 2019-07-05
Payer: MEDICARE

## 2019-07-05 DIAGNOSIS — R92.8 ABNORMAL MAMMOGRAM: ICD-10-CM

## 2019-07-05 DIAGNOSIS — R92.0 BREAST MICROCALCIFICATIONS: ICD-10-CM

## 2019-07-05 PROCEDURE — 77065 DX MAMMO INCL CAD UNI: CPT

## 2019-09-12 ENCOUNTER — HOSPITAL ENCOUNTER (OUTPATIENT)
Age: 84
Discharge: HOME | End: 2019-09-12
Payer: MEDICARE

## 2019-09-12 DIAGNOSIS — N18.32 CHRONIC KIDNEY DISEASE (CKD) STAGE G3B/A3, MODERATELY DECREASED GLOMERULAR FILTRATION RATE (GFR) BETWEEN 30-44 ML/MIN/1.73 SQUARE METER AND ALBUMINURIA CREATININE RATIO GREATER THAN 300 MG/G (HCC): ICD-10-CM

## 2019-09-12 LAB
ANION GAP SERPL CALCULATED.3IONS-SCNC: 16 MMOL/L (ref 4–16)
BACTERIA: NEGATIVE /HPF
BILIRUBIN URINE: NEGATIVE MG/DL
BLOOD, URINE: NEGATIVE
BUN BLDV-MCNC: 32 MG/DL (ref 6–23)
CALCIUM SERPL-MCNC: 9.5 MG/DL (ref 8.3–10.6)
CHLORIDE BLD-SCNC: 96 MMOL/L (ref 99–110)
CLARITY: CLEAR
CO2: 28 MMOL/L (ref 21–32)
COLOR: YELLOW
CREAT SERPL-MCNC: 1.9 MG/DL (ref 0.6–1.1)
CREATININE URINE: 26 MG/DL (ref 28–217)
GFR AFRICAN AMERICAN: 31 ML/MIN/1.73M2
GFR NON-AFRICAN AMERICAN: 25 ML/MIN/1.73M2
GLUCOSE BLD-MCNC: 99 MG/DL (ref 70–99)
GLUCOSE, URINE: NEGATIVE MG/DL
KETONES, URINE: NEGATIVE MG/DL
LEUKOCYTE ESTERASE, URINE: NEGATIVE
NITRITE URINE, QUANTITATIVE: NEGATIVE
PH, URINE: 6 (ref 5–8)
POTASSIUM SERPL-SCNC: 3.8 MMOL/L (ref 3.5–5.1)
PROT/CREAT RATIO, UR: ABNORMAL
PROTEIN UA: 100 MG/DL
RBC URINE: ABNORMAL /HPF (ref 0–6)
SODIUM BLD-SCNC: 140 MMOL/L (ref 135–145)
SPECIFIC GRAVITY UA: 1 (ref 1–1.03)
SQUAMOUS EPITHELIAL: <1 /HPF
TRICHOMONAS: ABNORMAL /HPF
URINE TOTAL PROTEIN: 79.5 MG/DL
UROBILINOGEN, URINE: NORMAL MG/DL (ref 0.2–1)
WBC UA: <1 /HPF (ref 0–5)

## 2019-09-12 PROCEDURE — 36415 COLL VENOUS BLD VENIPUNCTURE: CPT

## 2019-09-12 PROCEDURE — 81001 URINALYSIS AUTO W/SCOPE: CPT

## 2019-09-12 PROCEDURE — 82570 ASSAY OF URINE CREATININE: CPT

## 2019-09-12 PROCEDURE — 80048 BASIC METABOLIC PNL TOTAL CA: CPT

## 2019-09-12 PROCEDURE — 84156 ASSAY OF PROTEIN URINE: CPT

## 2019-10-23 ENCOUNTER — HOSPITAL ENCOUNTER (EMERGENCY)
Age: 84
Discharge: HOME | End: 2019-10-23
Attending: EMERGENCY MEDICINE
Payer: MEDICARE

## 2019-10-23 ENCOUNTER — APPOINTMENT (OUTPATIENT)
Dept: GENERAL RADIOLOGY | Age: 84
End: 2019-10-23
Payer: MEDICARE

## 2019-10-23 VITALS
WEIGHT: 115 LBS | RESPIRATION RATE: 20 BRPM | HEIGHT: 60 IN | OXYGEN SATURATION: 96 % | HEART RATE: 74 BPM | BODY MASS INDEX: 22.58 KG/M2 | TEMPERATURE: 98.6 F | DIASTOLIC BLOOD PRESSURE: 59 MMHG | SYSTOLIC BLOOD PRESSURE: 172 MMHG

## 2019-10-23 DIAGNOSIS — M54.9 UPPER BACK PAIN ON RIGHT SIDE: Primary | ICD-10-CM

## 2019-10-23 LAB
ALBUMIN SERPL-MCNC: 3.8 GM/DL (ref 3.4–5)
ALP BLD-CCNC: 71 IU/L (ref 40–129)
ALT SERPL-CCNC: 6 U/L (ref 10–40)
ANION GAP SERPL CALCULATED.3IONS-SCNC: 10 MMOL/L (ref 4–16)
AST SERPL-CCNC: 15 IU/L (ref 15–37)
BASOPHILS ABSOLUTE: 0 K/CU MM
BASOPHILS RELATIVE PERCENT: 0.6 % (ref 0–1)
BILIRUB SERPL-MCNC: 0.2 MG/DL (ref 0–1)
BUN BLDV-MCNC: 30 MG/DL (ref 6–23)
CALCIUM SERPL-MCNC: 9.2 MG/DL (ref 8.3–10.6)
CHLORIDE BLD-SCNC: 103 MMOL/L (ref 99–110)
CO2: 30 MMOL/L (ref 21–32)
CREAT SERPL-MCNC: 1.7 MG/DL (ref 0.6–1.1)
DIFFERENTIAL TYPE: ABNORMAL
EOSINOPHILS ABSOLUTE: 0.4 K/CU MM
EOSINOPHILS RELATIVE PERCENT: 5.1 % (ref 0–3)
GFR AFRICAN AMERICAN: 35 ML/MIN/1.73M2
GFR NON-AFRICAN AMERICAN: 29 ML/MIN/1.73M2
GLUCOSE BLD-MCNC: 108 MG/DL (ref 70–99)
HCT VFR BLD CALC: 33.9 % (ref 37–47)
HEMOGLOBIN: 11.1 GM/DL (ref 12.5–16)
IMMATURE NEUTROPHIL %: 0.3 % (ref 0–0.43)
LYMPHOCYTES ABSOLUTE: 2.2 K/CU MM
LYMPHOCYTES RELATIVE PERCENT: 29.8 % (ref 24–44)
MCH RBC QN AUTO: 31.1 PG (ref 27–31)
MCHC RBC AUTO-ENTMCNC: 32.7 % (ref 32–36)
MCV RBC AUTO: 95 FL (ref 78–100)
MONOCYTES ABSOLUTE: 0.8 K/CU MM
MONOCYTES RELATIVE PERCENT: 10.6 % (ref 0–4)
NUCLEATED RBC %: 0 %
PDW BLD-RTO: 13.8 % (ref 11.7–14.9)
PLATELET # BLD: 289 K/CU MM (ref 140–440)
PMV BLD AUTO: 9.8 FL (ref 7.5–11.1)
POTASSIUM SERPL-SCNC: 3.7 MMOL/L (ref 3.5–5.1)
RBC # BLD: 3.57 M/CU MM (ref 4.2–5.4)
SEGMENTED NEUTROPHILS ABSOLUTE COUNT: 3.9 K/CU MM
SEGMENTED NEUTROPHILS RELATIVE PERCENT: 53.6 % (ref 36–66)
SODIUM BLD-SCNC: 143 MMOL/L (ref 135–145)
TOTAL IMMATURE NEUTOROPHIL: 0.02 K/CU MM
TOTAL NUCLEATED RBC: 0 K/CU MM
TOTAL PROTEIN: 6.3 GM/DL (ref 6.4–8.2)
TROPONIN T: <0.01 NG/ML
WBC # BLD: 7.3 K/CU MM (ref 4–10.5)

## 2019-10-23 PROCEDURE — 93005 ELECTROCARDIOGRAM TRACING: CPT | Performed by: EMERGENCY MEDICINE

## 2019-10-23 PROCEDURE — 80053 COMPREHEN METABOLIC PANEL: CPT

## 2019-10-23 PROCEDURE — 36415 COLL VENOUS BLD VENIPUNCTURE: CPT

## 2019-10-23 PROCEDURE — 71046 X-RAY EXAM CHEST 2 VIEWS: CPT

## 2019-10-23 PROCEDURE — 84484 ASSAY OF TROPONIN QUANT: CPT

## 2019-10-23 PROCEDURE — 85025 COMPLETE CBC W/AUTO DIFF WBC: CPT

## 2019-10-23 PROCEDURE — 99284 EMERGENCY DEPT VISIT MOD MDM: CPT

## 2019-10-23 RX ORDER — CYCLOBENZAPRINE HCL 10 MG
10 TABLET ORAL 2 TIMES DAILY PRN
Qty: 15 TABLET | Refills: 0 | Status: SHIPPED | OUTPATIENT
Start: 2019-10-23 | End: 2019-11-02

## 2019-10-23 ASSESSMENT — PAIN SCALES - GENERAL: PAINLEVEL_OUTOF10: 7

## 2019-10-24 PROCEDURE — 93010 ELECTROCARDIOGRAM REPORT: CPT | Performed by: INTERNAL MEDICINE

## 2019-10-29 LAB
EKG ATRIAL RATE: 71 BPM
EKG DIAGNOSIS: NORMAL
EKG P AXIS: 78 DEGREES
EKG P-R INTERVAL: 152 MS
EKG Q-T INTERVAL: 418 MS
EKG QRS DURATION: 86 MS
EKG QTC CALCULATION (BAZETT): 454 MS
EKG R AXIS: 81 DEGREES
EKG T AXIS: 74 DEGREES
EKG VENTRICULAR RATE: 71 BPM

## 2019-11-06 ENCOUNTER — OFFICE VISIT (OUTPATIENT)
Dept: INTERNAL MEDICINE CLINIC | Age: 84
End: 2019-11-06
Payer: MEDICARE

## 2019-11-06 VITALS
HEART RATE: 81 BPM | SYSTOLIC BLOOD PRESSURE: 175 MMHG | WEIGHT: 116 LBS | BODY MASS INDEX: 22.78 KG/M2 | DIASTOLIC BLOOD PRESSURE: 67 MMHG | OXYGEN SATURATION: 98 % | HEIGHT: 60 IN

## 2019-11-06 DIAGNOSIS — K21.9 GASTROESOPHAGEAL REFLUX DISEASE WITHOUT ESOPHAGITIS: ICD-10-CM

## 2019-11-06 DIAGNOSIS — G62.9 NEUROPATHY: Primary | ICD-10-CM

## 2019-11-06 DIAGNOSIS — I10 HYPERTENSION, UNSPECIFIED TYPE: ICD-10-CM

## 2019-11-06 PROCEDURE — 4040F PNEUMOC VAC/ADMIN/RCVD: CPT | Performed by: FAMILY MEDICINE

## 2019-11-06 PROCEDURE — 1036F TOBACCO NON-USER: CPT | Performed by: FAMILY MEDICINE

## 2019-11-06 PROCEDURE — G8399 PT W/DXA RESULTS DOCUMENT: HCPCS | Performed by: FAMILY MEDICINE

## 2019-11-06 PROCEDURE — 1123F ACP DISCUSS/DSCN MKR DOCD: CPT | Performed by: FAMILY MEDICINE

## 2019-11-06 PROCEDURE — G8427 DOCREV CUR MEDS BY ELIG CLIN: HCPCS | Performed by: FAMILY MEDICINE

## 2019-11-06 PROCEDURE — G8484 FLU IMMUNIZE NO ADMIN: HCPCS | Performed by: FAMILY MEDICINE

## 2019-11-06 PROCEDURE — 1090F PRES/ABSN URINE INCON ASSESS: CPT | Performed by: FAMILY MEDICINE

## 2019-11-06 PROCEDURE — 99213 OFFICE O/P EST LOW 20 MIN: CPT | Performed by: FAMILY MEDICINE

## 2019-11-06 PROCEDURE — G8420 CALC BMI NORM PARAMETERS: HCPCS | Performed by: FAMILY MEDICINE

## 2019-11-06 RX ORDER — CYCLOBENZAPRINE HCL 10 MG
10 TABLET ORAL 3 TIMES DAILY PRN
COMMUNITY
End: 2019-11-06

## 2019-11-07 ENCOUNTER — APPOINTMENT (OUTPATIENT)
Dept: CT IMAGING | Age: 84
End: 2019-11-07
Payer: MEDICARE

## 2019-11-07 ENCOUNTER — HOSPITAL ENCOUNTER (EMERGENCY)
Age: 84
Discharge: HOME | End: 2019-11-07
Payer: MEDICARE

## 2019-11-07 VITALS
OXYGEN SATURATION: 98 % | RESPIRATION RATE: 15 BRPM | TEMPERATURE: 98.4 F | DIASTOLIC BLOOD PRESSURE: 60 MMHG | HEART RATE: 82 BPM | SYSTOLIC BLOOD PRESSURE: 170 MMHG
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
## 2019-11-07 DIAGNOSIS — S09.90XA CLOSED HEAD INJURY, INITIAL ENCOUNTER: Primary | ICD-10-CM

## 2019-11-07 DIAGNOSIS — S00.01XA ABRASION OF SCALP, INITIAL ENCOUNTER: ICD-10-CM

## 2019-11-07 PROCEDURE — 72125 CT NECK SPINE W/O DYE: CPT

## 2019-11-07 PROCEDURE — 70450 CT HEAD/BRAIN W/O DYE: CPT

## 2019-11-07 PROCEDURE — 99283 EMERGENCY DEPT VISIT LOW MDM: CPT

## 2019-11-07 ASSESSMENT — PAIN SCALES - GENERAL: PAINLEVEL_OUTOF10: 5

## 2019-11-07 ASSESSMENT — PAIN DESCRIPTION - LOCATION: LOCATION: HEAD

## 2019-11-09 ASSESSMENT — ENCOUNTER SYMPTOMS
NAUSEA: 0
CHEST TIGHTNESS: 0
COUGH: 0
ABDOMINAL PAIN: 0
SHORTNESS OF BREATH: 0

## 2019-11-12 ENCOUNTER — OFFICE VISIT (OUTPATIENT)
Dept: INTERNAL MEDICINE CLINIC | Age: 84
End: 2019-11-12
Payer: MEDICARE

## 2019-11-12 VITALS
DIASTOLIC BLOOD PRESSURE: 80 MMHG | WEIGHT: 117 LBS | HEART RATE: 85 BPM | HEIGHT: 60 IN | SYSTOLIC BLOOD PRESSURE: 150 MMHG | BODY MASS INDEX: 22.97 KG/M2 | OXYGEN SATURATION: 97 %

## 2019-11-12 DIAGNOSIS — K21.9 GASTROESOPHAGEAL REFLUX DISEASE WITHOUT ESOPHAGITIS: ICD-10-CM

## 2019-11-12 DIAGNOSIS — S09.90XD CLOSED HEAD INJURY, SUBSEQUENT ENCOUNTER: Primary | ICD-10-CM

## 2019-11-12 DIAGNOSIS — S00.01XD ABRASION OF SCALP, SUBSEQUENT ENCOUNTER: ICD-10-CM

## 2019-11-12 PROCEDURE — 1036F TOBACCO NON-USER: CPT | Performed by: FAMILY MEDICINE

## 2019-11-12 PROCEDURE — 4040F PNEUMOC VAC/ADMIN/RCVD: CPT | Performed by: FAMILY MEDICINE

## 2019-11-12 PROCEDURE — G8420 CALC BMI NORM PARAMETERS: HCPCS | Performed by: FAMILY MEDICINE

## 2019-11-12 PROCEDURE — G8399 PT W/DXA RESULTS DOCUMENT: HCPCS | Performed by: FAMILY MEDICINE

## 2019-11-12 PROCEDURE — G8484 FLU IMMUNIZE NO ADMIN: HCPCS | Performed by: FAMILY MEDICINE

## 2019-11-12 PROCEDURE — 1123F ACP DISCUSS/DSCN MKR DOCD: CPT | Performed by: FAMILY MEDICINE

## 2019-11-12 PROCEDURE — 1090F PRES/ABSN URINE INCON ASSESS: CPT | Performed by: FAMILY MEDICINE

## 2019-11-12 PROCEDURE — G8427 DOCREV CUR MEDS BY ELIG CLIN: HCPCS | Performed by: FAMILY MEDICINE

## 2019-11-12 PROCEDURE — 99213 OFFICE O/P EST LOW 20 MIN: CPT | Performed by: FAMILY MEDICINE

## 2019-11-12 RX ORDER — ESOMEPRAZOLE MAGNESIUM 40 MG/1
40 CAPSULE, DELAYED RELEASE ORAL
Qty: 90 CAPSULE | Refills: 3 | Status: ON HOLD | OUTPATIENT
Start: 2019-11-12 | End: 2020-06-01 | Stop reason: HOSPADM

## 2019-11-14 ASSESSMENT — ENCOUNTER SYMPTOMS
BLOOD IN STOOL: 0
SHORTNESS OF BREATH: 0
NAUSEA: 0
BACK PAIN: 1
COUGH: 0
CHEST TIGHTNESS: 0
EYES NEGATIVE: 1
ABDOMINAL PAIN: 0

## 2019-12-23 ENCOUNTER — HOSPITAL ENCOUNTER (OUTPATIENT)
Age: 84
Discharge: HOME | End: 2019-12-23
Payer: MEDICARE

## 2019-12-23 LAB
ANION GAP SERPL CALCULATED.3IONS-SCNC: 14 MMOL/L (ref 4–16)
BACTERIA: NEGATIVE /HPF
BILIRUBIN URINE: NEGATIVE MG/DL
BLOOD, URINE: NEGATIVE
BUN BLDV-MCNC: 28 MG/DL (ref 6–23)
CALCIUM SERPL-MCNC: 9.2 MG/DL (ref 8.3–10.6)
CHLORIDE BLD-SCNC: 95 MMOL/L (ref 99–110)
CLARITY: CLEAR
CO2: 28 MMOL/L (ref 21–32)
COLOR: YELLOW
CREAT SERPL-MCNC: 1.7 MG/DL (ref 0.6–1.1)
CREATININE URINE: 25.4 MG/DL (ref 28–217)
GFR AFRICAN AMERICAN: 35 ML/MIN/1.73M2
GFR NON-AFRICAN AMERICAN: 29 ML/MIN/1.73M2
GLUCOSE BLD-MCNC: 109 MG/DL (ref 70–99)
GLUCOSE, URINE: NEGATIVE MG/DL
KETONES, URINE: NEGATIVE MG/DL
LEUKOCYTE ESTERASE, URINE: NEGATIVE
MAGNESIUM: 2 MG/DL (ref 1.8–2.4)
NITRITE URINE, QUANTITATIVE: NEGATIVE
PH, URINE: 6 (ref 5–8)
POTASSIUM SERPL-SCNC: 3.4 MMOL/L (ref 3.5–5.1)
PROT/CREAT RATIO, UR: ABNORMAL
PROTEIN UA: 100 MG/DL
RBC URINE: ABNORMAL /HPF (ref 0–6)
SODIUM BLD-SCNC: 137 MMOL/L (ref 135–145)
SPECIFIC GRAVITY UA: 1.01 (ref 1–1.03)
SQUAMOUS EPITHELIAL: 2 /HPF
TRICHOMONAS: ABNORMAL /HPF
URINE TOTAL PROTEIN: 117.6 MG/DL
UROBILINOGEN, URINE: NORMAL MG/DL (ref 0.2–1)
WBC UA: ABNORMAL /HPF (ref 0–5)

## 2019-12-23 PROCEDURE — 81001 URINALYSIS AUTO W/SCOPE: CPT

## 2019-12-23 PROCEDURE — 36415 COLL VENOUS BLD VENIPUNCTURE: CPT

## 2019-12-23 PROCEDURE — 80048 BASIC METABOLIC PNL TOTAL CA: CPT

## 2019-12-23 PROCEDURE — 82570 ASSAY OF URINE CREATININE: CPT

## 2019-12-23 PROCEDURE — 83735 ASSAY OF MAGNESIUM: CPT

## 2019-12-23 PROCEDURE — 84156 ASSAY OF PROTEIN URINE: CPT

## 2020-02-12 ENCOUNTER — HOSPITAL ENCOUNTER (OUTPATIENT)
Dept: GENERAL RADIOLOGY | Age: 85
Discharge: HOME | End: 2020-02-12
Payer: MEDICARE

## 2020-02-12 ENCOUNTER — HOSPITAL ENCOUNTER (OUTPATIENT)
Age: 85
Discharge: HOME | End: 2020-02-12
Payer: MEDICARE

## 2020-02-12 ENCOUNTER — OFFICE VISIT (OUTPATIENT)
Dept: INTERNAL MEDICINE CLINIC | Age: 85
End: 2020-02-12
Payer: MEDICARE

## 2020-02-12 VITALS
OXYGEN SATURATION: 97 % | HEIGHT: 60 IN | WEIGHT: 110 LBS | DIASTOLIC BLOOD PRESSURE: 68 MMHG | SYSTOLIC BLOOD PRESSURE: 170 MMHG | BODY MASS INDEX: 21.6 KG/M2 | HEART RATE: 68 BPM

## 2020-02-12 PROCEDURE — 1090F PRES/ABSN URINE INCON ASSESS: CPT | Performed by: FAMILY MEDICINE

## 2020-02-12 PROCEDURE — G8926 SPIRO NO PERF OR DOC: HCPCS | Performed by: FAMILY MEDICINE

## 2020-02-12 PROCEDURE — 1036F TOBACCO NON-USER: CPT | Performed by: FAMILY MEDICINE

## 2020-02-12 PROCEDURE — G8484 FLU IMMUNIZE NO ADMIN: HCPCS | Performed by: FAMILY MEDICINE

## 2020-02-12 PROCEDURE — 1123F ACP DISCUSS/DSCN MKR DOCD: CPT | Performed by: FAMILY MEDICINE

## 2020-02-12 PROCEDURE — 99213 OFFICE O/P EST LOW 20 MIN: CPT | Performed by: FAMILY MEDICINE

## 2020-02-12 PROCEDURE — G8427 DOCREV CUR MEDS BY ELIG CLIN: HCPCS | Performed by: FAMILY MEDICINE

## 2020-02-12 PROCEDURE — G8399 PT W/DXA RESULTS DOCUMENT: HCPCS | Performed by: FAMILY MEDICINE

## 2020-02-12 PROCEDURE — 4040F PNEUMOC VAC/ADMIN/RCVD: CPT | Performed by: FAMILY MEDICINE

## 2020-02-12 PROCEDURE — 3023F SPIROM DOC REV: CPT | Performed by: FAMILY MEDICINE

## 2020-02-12 PROCEDURE — G8420 CALC BMI NORM PARAMETERS: HCPCS | Performed by: FAMILY MEDICINE

## 2020-02-12 PROCEDURE — 71046 X-RAY EXAM CHEST 2 VIEWS: CPT

## 2020-02-12 RX ORDER — TIZANIDINE 2 MG/1
2 TABLET ORAL DAILY PRN
Qty: 20 TABLET | Refills: 0 | Status: SHIPPED | OUTPATIENT
Start: 2020-02-12 | End: 2020-02-18

## 2020-02-12 RX ORDER — DOXYCYCLINE HYCLATE 100 MG
100 TABLET ORAL 2 TIMES DAILY
Qty: 20 TABLET | Refills: 0 | Status: SHIPPED | OUTPATIENT
Start: 2020-02-12 | End: 2020-02-22

## 2020-02-12 ASSESSMENT — PATIENT HEALTH QUESTIONNAIRE - PHQ9
SUM OF ALL RESPONSES TO PHQ9 QUESTIONS 1 & 2: 0
SUM OF ALL RESPONSES TO PHQ QUESTIONS 1-9: 0
1. LITTLE INTEREST OR PLEASURE IN DOING THINGS: 0
2. FEELING DOWN, DEPRESSED OR HOPELESS: 0
SUM OF ALL RESPONSES TO PHQ QUESTIONS 1-9: 0

## 2020-02-12 NOTE — PROGRESS NOTES
\"get pulmonary edema from a bad heart valve and CHF\" follow with Dr Jacquie Pringle    Chronic diarrhea     Chronic kidney disease     \"follow with Dr Elena Gardner" \"creatine level goes up and down\"    COPD (chronic obstructive pulmonary disease) ([facility]     follows with Dr Brook Moreno pulmonary edema ([facility] 11/2018    \"have hx of this so they have to watch the IV fluid\"    Full dentures     Gastroesophageal reflux disease without esophagitis     Hiatal hernia     History of blood transfusion     3/2019    Hyperlipidemia     Hypertension     Irritable bowel disease     \"better now\"    Left carotid bruit 11/21/2014    Localized edema     Low back pain     \"for yrs- wear a back brace when I do anything for support\"    Nicotine dependence, other tobacco product, uncomplicated     On home oxygen therapy     2l/nc just at night     Osteoporosis     PONV (postoperative nausea and vomiting)     hx of motion sickness    Proteinuria, unspecified     PVD (peripheral vascular disease) ([facility]     Severe mitral regurgitation 11/2018    Solitary cyst of left breast     Subclavian steal syndrome 11/21/2014    Right side    TIA (transient ischemic attack)     Unspecified asthma, uncomplicated     ( pt stated does not have asthma- with phone assess 4/16/2019)    Unspecified osteoarthritis, unspecified site     Unspecified urinary incontinence     VHD (valvular heart disease)     Wears glasses     Wears glasses        Past Surgical History:   Procedure Laterality Date    AORTO-FEMORAL [facility]PASS [facility]RAFT [facility]    BRAIN ANEURYSM SURGERY  2005    [facility]-Left parasellar- [facility]    BRAIN SURGERY      CARDIAC CATHETERIZATION      CHOLECYSTECTOMY      COLONOSCOPY N/A 3/10/2019    COLONOSCOPY DIAGNOSTIC WITH IDENTIFICATION OF POLYPS performed by Gregg Lew MD at [place] [address] COLONOSCOPY N/A 4/17/2019    COLONOSCOPY DIAGNOSTIC PEDI performed by Chan Phillips MD at [place] [address] KIDNEY BIOPSY  2018    UPPER GASTROINTESTINAL ENDOSCOPY N/A 3/9/2019    EGD DIAGNOSTIC ONLY performed by Sonia Galeana MD at [facility] ENDOSCOPY       Family History   Problem Relation Age of Onset    High Blood Pressure Mother     Heart Disease Mother         enlarged heart       Social History     Tobacco Use    Smoking status: Former Smoker     Packs/day: 1.00     Years: 65.00     Pack years: 65.00     Types: Cigarettes     Last attempt to quit: 2018     Years since quittin.2    Smokeless tobacco: Never Used   Substance Use Topics    Alcohol use: No    Drug use: No       Current Outpatient Medications   Medication Sig Dispense Refill    doxycycline hyclate (VIBRA-TABS) 100 MG tablet Take 1 tablet by mouth 2 times daily for 10 days 20 tablet 0    tiZANidine (ZANAFLEX) 2 MG tablet Take 1 tablet by mouth daily as needed (muscle spasms) 20 tablet 0    potassium chloride (KLOR-CON M) 20 MEQ extended release tablet Take 1 tablet by mouth daily 30 tablet 5    esomeprazole (NEXIUM) 40 MG delayed release capsule Take 1 capsule by mouth every morning (before breakfast) 90 capsule 3    torsemide (DEMADEX) 10 MG tablet Take 2 tablets by mouth 2 times daily 360 tablet 3    amLODIPine (NORVASC) 2.5 MG tablet Take 1 tablet by mouth daily (Patient taking differently: Take 2.5 mg by mouth nightly ) 90 tablet 1    gabapentin (NEURONTIN) 100 MG capsule Take 100 mg by mouth nightly.       pravastatin (PRAVACHOL) 40 MG tablet Take 1 tablet by mouth daily (Patient taking differently: Take 10 mg by mouth daily ) 30 tablet 0    carvedilol (COREG) 3.125 MG tablet Take 1 tablet by mouth 2 times daily (with meals) 60 tablet 0    albuterol sulfate HFA (PROAIR HFA) 108 (90 Base) MCG/ACT inhaler Inhale 2 puffs into the lungs every 6 hours as needed for Wheezing 1 Inhaler 0    Calcium Carb-Cholecalciferol (CALCIUM + D3 PO) Take 1,200 mg by mouth daily      acetaminophen (TYLENOL ARTHRITIS PAIN) 650 MG extended release tablet Take 1,300 mg by mouth as needed for Pain      Multiple Vitamins-Minerals (MULTIVITAMIN ADULT PO) Take by mouth daily      clopidogrel (PLAVIX) 75 MG tablet Take 75 mg by mouth daily.  Cilostazol (PLETAL PO) Take 100 mg by mouth daily        No current facility-administered medications for this visit. OBJECTIVE:    BP (!) 170/68   Pulse 68   Ht 5' (1.524 m)   Wt 110 lb (49.9 kg)   SpO2 97%   BMI 21.48 kg/m²     Physical Exam  Vitals signs reviewed. Constitutional:       General: She is not in acute distress. Appearance: She is well-developed. HENT:      Right Ear: Tympanic membrane normal.      Left Ear: Tympanic membrane normal.      Nose: Nose normal.      Mouth/Throat:      Mouth: Mucous membranes are moist.   Eyes:      Extraocular Movements: Extraocular movements intact. Conjunctiva/sclera: Conjunctivae normal.      Pupils: Pupils are equal, round, and reactive to light. Neck:      Musculoskeletal: Neck supple. Muscular tenderness (muscle tension paracervical muscles) present. Cardiovascular:      Rate and Rhythm: Normal rate and regular rhythm. Heart sounds: Murmur present. Pulmonary:      Effort: Pulmonary effort is normal. No respiratory distress. Breath sounds: Normal breath sounds. Abdominal:      General: Bowel sounds are normal.      Palpations: Abdomen is soft. Tenderness: There is no abdominal tenderness. Musculoskeletal:      Right lower leg: Edema (trace) present. Left lower leg: Edema (trace) present. Neurological:      Mental Status: She is alert and oriented to person, place, and time. Cranial Nerves: No cranial nerve deficit. Sensory: Sensory deficit (feet-chronic) present. Psychiatric:         Mood and Affect: Mood normal.         ASSESSMENT:  1. Acute exacerbation of chronic obstructive pulmonary disease (COPD) ([address]    2. DDD (degenerative disc disease), cervical    3.  Neuropathy        PLAN:  Orders Placed This Encounter   Procedures    XR CHEST STANDARD (2 VW)       Orders Placed This Encounter   Medications    doxycycline hyclate (VIBRA-TABS) 100 MG tablet     Sig: Take 1 tablet by mouth 2 times daily for 10 days     Dispense:  20 tablet     Refill:  0    tiZANidine (ZANAFLEX) 2 MG tablet     Sig: Take 1 tablet by mouth daily as needed (muscle spasms)     Dispense:  20 tablet     Refill:  0   Obtain CXR  Start Doxycycline  Pt would like to use Tizanidine. ADR's discussed with patient and daughter  Continue medications  Monitor BP. Keep f/u with nephrology, cardiology and other specialists  Persist RTO or call. Worse to ER           Return in about 6 months (around 8/12/2020) for neuropathy.     Electronically Signed by Hugo Rojas DO

## 2020-02-16 ASSESSMENT — ENCOUNTER SYMPTOMS
CONSTIPATION: 0
BLOOD IN STOOL: 0
ABDOMINAL PAIN: 0
SINUS PRESSURE: 0
RHINORRHEA: 1
COUGH: 1
SORE THROAT: 0
BACK PAIN: 0
SHORTNESS OF BREATH: 0
DIARRHEA: 0
CHEST TIGHTNESS: 0
NAUSEA: 0

## 2020-03-25 PROBLEM — N17.9 ACUTE RENAL FAILURE (HCC): Status: RESOLVED | Noted: 2017-04-17 | Resolved: 2020-03-24

## 2020-03-25 PROBLEM — N17.9 ACUTE KIDNEY FAILURE (HCC): Status: RESOLVED | Noted: 2018-11-26 | Resolved: 2020-03-24

## 2020-03-25 PROBLEM — N17.9 ACUTE KIDNEY INJURY (HCC): Status: RESOLVED | Noted: 2019-02-01 | Resolved: 2020-03-24

## 2020-03-27 ENCOUNTER — OFFICE VISIT (OUTPATIENT)
Dept: FAMILY MEDICINE CLINIC | Age: 85
End: 2020-03-27
Payer: MEDICARE

## 2020-03-27 VITALS
SYSTOLIC BLOOD PRESSURE: 148 MMHG | HEART RATE: 91 BPM | TEMPERATURE: 98.3 F | WEIGHT: 105.8 LBS | BODY MASS INDEX: 20.66 KG/M2 | OXYGEN SATURATION: 95 % | DIASTOLIC BLOOD PRESSURE: 74 MMHG

## 2020-03-27 PROCEDURE — 1090F PRES/ABSN URINE INCON ASSESS: CPT | Performed by: NURSE PRACTITIONER

## 2020-03-27 PROCEDURE — G8427 DOCREV CUR MEDS BY ELIG CLIN: HCPCS | Performed by: NURSE PRACTITIONER

## 2020-03-27 PROCEDURE — 4040F PNEUMOC VAC/ADMIN/RCVD: CPT | Performed by: NURSE PRACTITIONER

## 2020-03-27 PROCEDURE — G8420 CALC BMI NORM PARAMETERS: HCPCS | Performed by: NURSE PRACTITIONER

## 2020-03-27 PROCEDURE — G8399 PT W/DXA RESULTS DOCUMENT: HCPCS | Performed by: NURSE PRACTITIONER

## 2020-03-27 PROCEDURE — 99213 OFFICE O/P EST LOW 20 MIN: CPT | Performed by: NURSE PRACTITIONER

## 2020-03-27 PROCEDURE — 1036F TOBACCO NON-USER: CPT | Performed by: NURSE PRACTITIONER

## 2020-03-27 PROCEDURE — 1123F ACP DISCUSS/DSCN MKR DOCD: CPT | Performed by: NURSE PRACTITIONER

## 2020-03-27 PROCEDURE — G8482 FLU IMMUNIZE ORDER/ADMIN: HCPCS | Performed by: NURSE PRACTITIONER

## 2020-03-27 RX ORDER — DOXYCYCLINE HYCLATE 100 MG
100 TABLET ORAL 2 TIMES DAILY
Qty: 14 TABLET | Refills: 0 | Status: SHIPPED | OUTPATIENT
Start: 2020-03-27 | End: 2020-04-03

## 2020-03-27 RX ORDER — GUAIFENESIN 600 MG/1
600 TABLET, EXTENDED RELEASE ORAL 2 TIMES DAILY
Qty: 20 TABLET | Refills: 0 | Status: ON HOLD | OUTPATIENT
Start: 2020-03-27 | End: 2020-06-01 | Stop reason: HOSPADM

## 2020-03-27 RX ORDER — METHYLPREDNISOLONE 4 MG/1
TABLET ORAL
Qty: 1 KIT | Refills: 0 | Status: SHIPPED | OUTPATIENT
Start: 2020-03-27 | End: 2020-04-02

## 2020-03-27 RX ORDER — BENZONATATE 100 MG/1
100 CAPSULE ORAL 3 TIMES DAILY PRN
Qty: 20 CAPSULE | Refills: 0 | Status: SHIPPED | OUTPATIENT
Start: 2020-03-27 | End: 2020-05-18 | Stop reason: SDUPTHER

## 2020-03-27 ASSESSMENT — ENCOUNTER SYMPTOMS
SORE THROAT: 0
RHINORRHEA: 1
COUGH: 1
WHEEZING: 1
SHORTNESS OF BREATH: 0
HEARTBURN: 0
HEMOPTYSIS: 0

## 2020-03-27 NOTE — PROGRESS NOTES
loss.   HENT: Positive for congestion, postnasal drip and rhinorrhea. Negative for ear pain, sinus pressure, sinus pain, sneezing and sore throat. Respiratory: Positive for cough, chest tightness and wheezing (little bit). Negative for hemoptysis and shortness of breath. Cardiovascular: Negative for chest pain and palpitations. Gastrointestinal: Negative for diarrhea, heartburn, nausea and vomiting. Musculoskeletal: Negative for myalgias. Skin: Negative for rash. Neurological: Positive for headaches. Negative for dizziness and light-headedness. Current Outpatient Medications   Medication Sig Dispense Refill    doxycycline hyclate (VIBRA-TABS) 100 MG tablet Take 1 tablet by mouth 2 times daily for 7 days 14 tablet 0    methylPREDNISolone (MEDROL, KATHARINE,) 4 MG tablet Take by mouth. 1 kit 0    benzonatate (TESSALON PERLES) 100 MG capsule Take 1 capsule by mouth 3 times daily as needed for Cough 20 capsule 0    guaiFENesin (MUCINEX) 600 MG extended release tablet Take 1 tablet by mouth 2 times daily 20 tablet 0    gabapentin (NEURONTIN) 100 MG capsule Take 1 capsule by mouth 2 times daily for 30 days.  60 capsule 0    losartan (COZAAR) 100 MG tablet Take 1 tablet by mouth daily 90 tablet 5    potassium chloride (KLOR-CON M) 20 MEQ extended release tablet Take 1 tablet by mouth daily 30 tablet 5    esomeprazole (NEXIUM) 40 MG delayed release capsule Take 1 capsule by mouth every morning (before breakfast) 90 capsule 3    torsemide (DEMADEX) 10 MG tablet Take 2 tablets by mouth 2 times daily 360 tablet 3    amLODIPine (NORVASC) 2.5 MG tablet Take 1 tablet by mouth daily (Patient taking differently: Take 2.5 mg by mouth nightly ) 90 tablet 1    pravastatin (PRAVACHOL) 40 MG tablet Take 1 tablet by mouth daily (Patient taking differently: Take 10 mg by mouth daily ) 30 tablet 0    carvedilol (COREG) 3.125 MG tablet Take 1 tablet by mouth 2 times daily (with meals) 60 tablet 0    albuterol sulfate HFA (PROAIR HFA) 108 (90 Base) MCG/ACT inhaler Inhale 2 puffs into the lungs every 6 hours as needed for Wheezing 1 Inhaler 0    Calcium Carb-Cholecalciferol (CALCIUM + D3 PO) Take 1,200 mg by mouth daily      acetaminophen (TYLENOL ARTHRITIS PAIN) 650 MG extended release tablet Take 1,300 mg by mouth as needed for Pain      Multiple Vitamins-Minerals (MULTIVITAMIN ADULT PO) Take by mouth daily      clopidogrel (PLAVIX) 75 MG tablet Take 75 mg by mouth daily.  Cilostazol (PLETAL PO) Take 100 mg by mouth daily        No current facility-administered medications for this visit. Past medical, family,surgical history reviewed today. Objective:  BP (!) 148/74   Pulse 91   Temp 98.3 °F (36.8 °C) (Oral)   Wt 105 lb 12.8 oz (48 kg)   SpO2 95%   BMI 20.66 kg/m²   BP Readings from Last 3 Encounters:   03/27/20 (!) 148/74   02/18/20 (!) 130/58   02/12/20 (!) 170/68     Wt Readings from Last 3 Encounters:   03/27/20 105 lb 12.8 oz (48 kg)   02/18/20 109 lb 3.2 oz (49.5 kg)   02/12/20 110 lb (49.9 kg)         Physical Exam  Constitutional:       Appearance: Normal appearance. HENT:      Head: Normocephalic. Right Ear: Tympanic membrane, ear canal and external ear normal.      Left Ear: Tympanic membrane, ear canal and external ear normal.      Nose: Nose normal.      Mouth/Throat:      Lips: Pink. Mouth: Mucous membranes are moist.      Pharynx: Oropharynx is clear. Neck:      Musculoskeletal: Neck supple. Cardiovascular:      Rate and Rhythm: Normal rate and regular rhythm. Heart sounds: Normal heart sounds. Pulmonary:      Effort: Pulmonary effort is normal.      Breath sounds: Wheezing (scattered) present. Skin:     General: Skin is warm and dry. Neurological:      Mental Status: She is alert and oriented to person, place, and time.    Psychiatric:         Mood and Affect: Mood normal.         Behavior: Behavior normal.         Lab Results   Component Value Date Refill: 0          There are no discontinued medications. No orders of the defined types were placed in this encounter. Care discussed with patient. Questions answered. Patient verbalizes understanding and agrees with plan. After visit summary provided. Advised to call for any problems, questions, or concerns. Return if symptoms worsen or fail to improve.                                              Signed:  AKIL Martinez CNP  03/30/20  7:03 PM

## 2020-03-30 ASSESSMENT — ENCOUNTER SYMPTOMS
CHEST TIGHTNESS: 1
VOMITING: 0
SINUS PRESSURE: 0
SINUS PAIN: 0
DIARRHEA: 0
NAUSEA: 0

## 2020-05-09 ENCOUNTER — APPOINTMENT (OUTPATIENT)
Dept: GENERAL RADIOLOGY | Age: 85
DRG: 291 | End: 2020-05-09
Payer: MEDICARE

## 2020-05-09 ENCOUNTER — APPOINTMENT (OUTPATIENT)
Dept: ULTRASOUND IMAGING | Age: 85
DRG: 291 | End: 2020-05-09
Payer: MEDICARE

## 2020-05-09 ENCOUNTER — HOSPITAL ENCOUNTER (INPATIENT)
Age: 85
LOS: 3 days | Discharge: HOME | DRG: 291 | End: 2020-05-12
Attending: EMERGENCY MEDICINE | Admitting: INTERNAL MEDICINE
Payer: MEDICARE

## 2020-05-09 PROBLEM — J96.21 ACUTE ON CHRONIC RESPIRATORY FAILURE WITH HYPOXIA AND HYPERCAPNIA (HCC): Status: ACTIVE | Noted: 2020-05-09

## 2020-05-09 PROBLEM — J96.22 ACUTE ON CHRONIC RESPIRATORY FAILURE WITH HYPOXIA AND HYPERCAPNIA (HCC): Status: ACTIVE | Noted: 2020-05-09

## 2020-05-09 LAB
ALBUMIN SERPL-MCNC: 3.9 GM/DL (ref 3.4–5)
ALP BLD-CCNC: 100 IU/L (ref 40–128)
ALT SERPL-CCNC: 15 U/L (ref 10–40)
ANION GAP SERPL CALCULATED.3IONS-SCNC: 21 MMOL/L (ref 4–16)
APTT: 32.4 SECONDS (ref 25.1–37.1)
AST SERPL-CCNC: 34 IU/L (ref 15–37)
BASE EXCESS: 12 (ref 0–2.4)
BASOPHILS ABSOLUTE: 0.1 K/CU MM
BASOPHILS RELATIVE PERCENT: 0.8 % (ref 0–1)
BILIRUB SERPL-MCNC: 0.3 MG/DL (ref 0–1)
BUN BLDV-MCNC: 32 MG/DL (ref 6–23)
CALCIUM SERPL-MCNC: 8.6 MG/DL (ref 8.3–10.6)
CHLORIDE BLD-SCNC: 99 MMOL/L (ref 99–110)
CO2: 20 MMOL/L (ref 21–32)
COMMENT: ABNORMAL
CREAT SERPL-MCNC: 1.9 MG/DL (ref 0.6–1.1)
D DIMER: 909 NG/ML(DDU)
DIFFERENTIAL TYPE: ABNORMAL
EOSINOPHILS ABSOLUTE: 0.9 K/CU MM
EOSINOPHILS RELATIVE PERCENT: 5.8 % (ref 0–3)
FERRITIN: 105 NG/ML (ref 15–150)
FIBRINOGEN LEVEL: 561 MG/DL (ref 196.9–442.1)
GFR AFRICAN AMERICAN: 31 ML/MIN/1.73M2
GFR NON-AFRICAN AMERICAN: 25 ML/MIN/1.73M2
GLUCOSE BLD-MCNC: 347 MG/DL (ref 70–99)
HCO3 VENOUS: 18.5 MMOL/L (ref 19–25)
HCT VFR BLD CALC: 34.3 % (ref 37–47)
HEMOGLOBIN: 10.6 GM/DL (ref 12.5–16)
HIGH SENSITIVE C-REACTIVE PROTEIN: 8 MG/L
IMMATURE NEUTROPHIL %: 0.8 % (ref 0–0.43)
INR BLD: 0.95 INDEX
LACTATE DEHYDROGENASE: 281 IU/L (ref 120–246)
LACTATE: 1 MMOL/L (ref 0.4–2)
LACTATE: 6.5 MMOL/L (ref 0.4–2)
LYMPHOCYTES ABSOLUTE: 6.3 K/CU MM
LYMPHOCYTES RELATIVE PERCENT: 40.4 % (ref 24–44)
MAGNESIUM: 2.1 MG/DL (ref 1.8–2.4)
MCH RBC QN AUTO: 31.1 PG (ref 27–31)
MCHC RBC AUTO-ENTMCNC: 30.9 % (ref 32–36)
MCV RBC AUTO: 100.6 FL (ref 78–100)
MONOCYTES ABSOLUTE: 1 K/CU MM
MONOCYTES RELATIVE PERCENT: 6.5 % (ref 0–4)
NUCLEATED RBC %: 0 %
O2 SAT, VEN: 88.3 % (ref 50–70)
PCO2, VEN: 61 MMHG (ref 38–52)
PDW BLD-RTO: 14.1 % (ref 11.7–14.9)
PH VENOUS: 7.09 (ref 7.32–7.42)
PLATELET # BLD: 478 K/CU MM (ref 140–440)
PMV BLD AUTO: 10.1 FL (ref 7.5–11.1)
PO2, VEN: 76 MMHG (ref 28–48)
POTASSIUM SERPL-SCNC: 4.3 MMOL/L (ref 3.5–5.1)
PRO-BNP: ABNORMAL PG/ML
PROCALCITONIN: 0.04
PROTHROMBIN TIME: 11.5 SECONDS (ref 11.7–14.5)
RBC # BLD: 3.41 M/CU MM (ref 4.2–5.4)
SEGMENTED NEUTROPHILS ABSOLUTE COUNT: 7.1 K/CU MM
SEGMENTED NEUTROPHILS RELATIVE PERCENT: 45.7 % (ref 36–66)
SODIUM BLD-SCNC: 140 MMOL/L (ref 135–145)
TOTAL IMMATURE NEUTOROPHIL: 0.13 K/CU MM
TOTAL NUCLEATED RBC: 0 K/CU MM
TOTAL PROTEIN: 6.7 GM/DL (ref 6.4–8.2)
TROPONIN T: <0.01 NG/ML
WBC # BLD: 15.6 K/CU MM (ref 4–10.5)

## 2020-05-09 PROCEDURE — 85384 FIBRINOGEN ACTIVITY: CPT

## 2020-05-09 PROCEDURE — 6360000002 HC RX W HCPCS: Performed by: PHYSICIAN ASSISTANT

## 2020-05-09 PROCEDURE — 71045 X-RAY EXAM CHEST 1 VIEW: CPT

## 2020-05-09 PROCEDURE — 2580000003 HC RX 258: Performed by: INTERNAL MEDICINE

## 2020-05-09 PROCEDURE — 83880 ASSAY OF NATRIURETIC PEPTIDE: CPT

## 2020-05-09 PROCEDURE — 6360000002 HC RX W HCPCS: Performed by: INTERNAL MEDICINE

## 2020-05-09 PROCEDURE — 86141 C-REACTIVE PROTEIN HS: CPT

## 2020-05-09 PROCEDURE — 80053 COMPREHEN METABOLIC PANEL: CPT

## 2020-05-09 PROCEDURE — 6370000000 HC RX 637 (ALT 250 FOR IP): Performed by: INTERNAL MEDICINE

## 2020-05-09 PROCEDURE — 85730 THROMBOPLASTIN TIME PARTIAL: CPT

## 2020-05-09 PROCEDURE — 99222 1ST HOSP IP/OBS MODERATE 55: CPT | Performed by: INTERNAL MEDICINE

## 2020-05-09 PROCEDURE — 83615 LACTATE (LD) (LDH) ENZYME: CPT

## 2020-05-09 PROCEDURE — 82805 BLOOD GASES W/O2 SATURATION: CPT

## 2020-05-09 PROCEDURE — 85610 PROTHROMBIN TIME: CPT

## 2020-05-09 PROCEDURE — 84145 PROCALCITONIN (PCT): CPT

## 2020-05-09 PROCEDURE — 84484 ASSAY OF TROPONIN QUANT: CPT

## 2020-05-09 PROCEDURE — 83605 ASSAY OF LACTIC ACID: CPT

## 2020-05-09 PROCEDURE — 99291 CRITICAL CARE FIRST HOUR: CPT

## 2020-05-09 PROCEDURE — 87449 NOS EACH ORGANISM AG IA: CPT

## 2020-05-09 PROCEDURE — 36415 COLL VENOUS BLD VENIPUNCTURE: CPT

## 2020-05-09 PROCEDURE — 36600 WITHDRAWAL OF ARTERIAL BLOOD: CPT

## 2020-05-09 PROCEDURE — 96374 THER/PROPH/DIAG INJ IV PUSH: CPT

## 2020-05-09 PROCEDURE — 6360000002 HC RX W HCPCS: Performed by: EMERGENCY MEDICINE

## 2020-05-09 PROCEDURE — 87077 CULTURE AEROBIC IDENTIFY: CPT

## 2020-05-09 PROCEDURE — 2100000000 HC CCU R&B

## 2020-05-09 PROCEDURE — 85025 COMPLETE CBC W/AUTO DIFF WBC: CPT

## 2020-05-09 PROCEDURE — 93010 ELECTROCARDIOGRAM REPORT: CPT | Performed by: INTERNAL MEDICINE

## 2020-05-09 PROCEDURE — 94640 AIRWAY INHALATION TREATMENT: CPT

## 2020-05-09 PROCEDURE — 82728 ASSAY OF FERRITIN: CPT

## 2020-05-09 PROCEDURE — 87899 AGENT NOS ASSAY W/OPTIC: CPT

## 2020-05-09 PROCEDURE — 93971 EXTREMITY STUDY: CPT

## 2020-05-09 PROCEDURE — 87040 BLOOD CULTURE FOR BACTERIA: CPT

## 2020-05-09 PROCEDURE — 4500000027

## 2020-05-09 PROCEDURE — 94660 CPAP INITIATION&MGMT: CPT

## 2020-05-09 PROCEDURE — U0002 COVID-19 LAB TEST NON-CDC: HCPCS

## 2020-05-09 PROCEDURE — 51702 INSERT TEMP BLADDER CATH: CPT

## 2020-05-09 PROCEDURE — 83735 ASSAY OF MAGNESIUM: CPT

## 2020-05-09 PROCEDURE — 85379 FIBRIN DEGRADATION QUANT: CPT

## 2020-05-09 PROCEDURE — 93005 ELECTROCARDIOGRAM TRACING: CPT | Performed by: PHYSICIAN ASSISTANT

## 2020-05-09 PROCEDURE — 2700000000 HC OXYGEN THERAPY PER DAY

## 2020-05-09 PROCEDURE — 2580000003 HC RX 258: Performed by: PHYSICIAN ASSISTANT

## 2020-05-09 PROCEDURE — 94761 N-INVAS EAR/PLS OXIMETRY MLT: CPT

## 2020-05-09 RX ORDER — POLYETHYLENE GLYCOL 3350 17 G/17G
17 POWDER, FOR SOLUTION ORAL DAILY PRN
Status: DISCONTINUED | OUTPATIENT
Start: 2020-05-09 | End: 2020-05-12 | Stop reason: HOSPADM

## 2020-05-09 RX ORDER — ACETAMINOPHEN 325 MG/1
650 TABLET ORAL EVERY 6 HOURS PRN
Status: DISCONTINUED | OUTPATIENT
Start: 2020-05-09 | End: 2020-05-09 | Stop reason: SDUPTHER

## 2020-05-09 RX ORDER — SODIUM CHLORIDE 0.9 % (FLUSH) 0.9 %
10 SYRINGE (ML) INJECTION PRN
Status: DISCONTINUED | OUTPATIENT
Start: 2020-05-09 | End: 2020-05-12 | Stop reason: HOSPADM

## 2020-05-09 RX ORDER — ACETAMINOPHEN 650 MG/1
650 SUPPOSITORY RECTAL EVERY 6 HOURS PRN
Status: DISCONTINUED | OUTPATIENT
Start: 2020-05-09 | End: 2020-05-12 | Stop reason: HOSPADM

## 2020-05-09 RX ORDER — PANTOPRAZOLE SODIUM 40 MG/1
40 TABLET, DELAYED RELEASE ORAL
Status: DISCONTINUED | OUTPATIENT
Start: 2020-05-10 | End: 2020-05-12 | Stop reason: HOSPADM

## 2020-05-09 RX ORDER — ACETAMINOPHEN 650 MG/1
650 SUPPOSITORY RECTAL EVERY 6 HOURS PRN
Status: DISCONTINUED | OUTPATIENT
Start: 2020-05-09 | End: 2020-05-09 | Stop reason: SDUPTHER

## 2020-05-09 RX ORDER — POTASSIUM CHLORIDE 20 MEQ/1
20 TABLET, EXTENDED RELEASE ORAL DAILY
Status: DISCONTINUED | OUTPATIENT
Start: 2020-05-09 | End: 2020-05-12 | Stop reason: HOSPADM

## 2020-05-09 RX ORDER — ONDANSETRON 2 MG/ML
4 INJECTION INTRAMUSCULAR; INTRAVENOUS EVERY 6 HOURS PRN
Status: DISCONTINUED | OUTPATIENT
Start: 2020-05-09 | End: 2020-05-12 | Stop reason: HOSPADM

## 2020-05-09 RX ORDER — PROMETHAZINE HYDROCHLORIDE 25 MG/1
12.5 TABLET ORAL EVERY 6 HOURS PRN
Status: DISCONTINUED | OUTPATIENT
Start: 2020-05-09 | End: 2020-05-12 | Stop reason: HOSPADM

## 2020-05-09 RX ORDER — DILTIAZEM HYDROCHLORIDE 60 MG/1
60 TABLET, FILM COATED ORAL EVERY 8 HOURS SCHEDULED
Status: DISCONTINUED | OUTPATIENT
Start: 2020-05-09 | End: 2020-05-12 | Stop reason: HOSPADM

## 2020-05-09 RX ORDER — CARVEDILOL 3.12 MG/1
3.12 TABLET ORAL 2 TIMES DAILY WITH MEALS
Status: DISCONTINUED | OUTPATIENT
Start: 2020-05-09 | End: 2020-05-09

## 2020-05-09 RX ORDER — GABAPENTIN 100 MG/1
100 CAPSULE ORAL 2 TIMES DAILY
Status: DISCONTINUED | OUTPATIENT
Start: 2020-05-09 | End: 2020-05-12 | Stop reason: HOSPADM

## 2020-05-09 RX ORDER — ALBUTEROL SULFATE 90 UG/1
2 AEROSOL, METERED RESPIRATORY (INHALATION) EVERY 6 HOURS PRN
Status: DISCONTINUED | OUTPATIENT
Start: 2020-05-09 | End: 2020-05-09 | Stop reason: SDUPTHER

## 2020-05-09 RX ORDER — FUROSEMIDE 10 MG/ML
40 INJECTION INTRAMUSCULAR; INTRAVENOUS 3 TIMES DAILY
Status: DISCONTINUED | OUTPATIENT
Start: 2020-05-09 | End: 2020-05-11

## 2020-05-09 RX ORDER — ALBUTEROL SULFATE 90 UG/1
2 AEROSOL, METERED RESPIRATORY (INHALATION) 4 TIMES DAILY
Status: DISCONTINUED | OUTPATIENT
Start: 2020-05-09 | End: 2020-05-12 | Stop reason: HOSPADM

## 2020-05-09 RX ORDER — FUROSEMIDE 10 MG/ML
40 INJECTION INTRAMUSCULAR; INTRAVENOUS ONCE
Status: COMPLETED | OUTPATIENT
Start: 2020-05-09 | End: 2020-05-09

## 2020-05-09 RX ORDER — LOSARTAN POTASSIUM 100 MG/1
100 TABLET ORAL DAILY
Status: DISCONTINUED | OUTPATIENT
Start: 2020-05-09 | End: 2020-05-12 | Stop reason: HOSPADM

## 2020-05-09 RX ORDER — M-VIT,TX,IRON,MINS/CALC/FOLIC 27MG-0.4MG
1 TABLET ORAL DAILY
Status: DISCONTINUED | OUTPATIENT
Start: 2020-05-09 | End: 2020-05-12 | Stop reason: HOSPADM

## 2020-05-09 RX ORDER — SENNOSIDES 8.6 MG
1300 CAPSULE ORAL PRN
Status: DISCONTINUED | OUTPATIENT
Start: 2020-05-09 | End: 2020-05-09 | Stop reason: SDUPTHER

## 2020-05-09 RX ORDER — GUAIFENESIN 600 MG/1
600 TABLET, EXTENDED RELEASE ORAL 2 TIMES DAILY
Status: DISCONTINUED | OUTPATIENT
Start: 2020-05-09 | End: 2020-05-12 | Stop reason: HOSPADM

## 2020-05-09 RX ORDER — AMLODIPINE BESYLATE 5 MG/1
2.5 TABLET ORAL NIGHTLY
Status: DISCONTINUED | OUTPATIENT
Start: 2020-05-09 | End: 2020-05-09

## 2020-05-09 RX ORDER — ACETAMINOPHEN 325 MG/1
650 TABLET ORAL EVERY 6 HOURS PRN
Status: DISCONTINUED | OUTPATIENT
Start: 2020-05-09 | End: 2020-05-12 | Stop reason: HOSPADM

## 2020-05-09 RX ORDER — CLOPIDOGREL BISULFATE 75 MG/1
75 TABLET ORAL DAILY
Status: DISCONTINUED | OUTPATIENT
Start: 2020-05-09 | End: 2020-05-12 | Stop reason: HOSPADM

## 2020-05-09 RX ORDER — METHYLPREDNISOLONE SODIUM SUCCINATE 125 MG/2ML
125 INJECTION, POWDER, LYOPHILIZED, FOR SOLUTION INTRAMUSCULAR; INTRAVENOUS ONCE
Status: COMPLETED | OUTPATIENT
Start: 2020-05-09 | End: 2020-05-09

## 2020-05-09 RX ORDER — METHYLPREDNISOLONE SODIUM SUCCINATE 40 MG/ML
40 INJECTION, POWDER, LYOPHILIZED, FOR SOLUTION INTRAMUSCULAR; INTRAVENOUS EVERY 6 HOURS
Status: DISCONTINUED | OUTPATIENT
Start: 2020-05-09 | End: 2020-05-10

## 2020-05-09 RX ORDER — PRAVASTATIN SODIUM 10 MG
10 TABLET ORAL NIGHTLY
Status: DISCONTINUED | OUTPATIENT
Start: 2020-05-09 | End: 2020-05-12 | Stop reason: HOSPADM

## 2020-05-09 RX ORDER — SODIUM CHLORIDE 0.9 % (FLUSH) 0.9 %
10 SYRINGE (ML) INJECTION EVERY 12 HOURS SCHEDULED
Status: DISCONTINUED | OUTPATIENT
Start: 2020-05-09 | End: 2020-05-12 | Stop reason: HOSPADM

## 2020-05-09 RX ORDER — FUROSEMIDE 10 MG/ML
40 INJECTION INTRAMUSCULAR; INTRAVENOUS 2 TIMES DAILY
Status: DISCONTINUED | OUTPATIENT
Start: 2020-05-09 | End: 2020-05-09

## 2020-05-09 RX ORDER — HEPARIN SODIUM 5000 [USP'U]/ML
5000 INJECTION, SOLUTION INTRAVENOUS; SUBCUTANEOUS EVERY 8 HOURS SCHEDULED
Status: DISCONTINUED | OUTPATIENT
Start: 2020-05-09 | End: 2020-05-12 | Stop reason: HOSPADM

## 2020-05-09 RX ADMIN — CEFTRIAXONE SODIUM 1 G: 1 INJECTION, POWDER, FOR SOLUTION INTRAMUSCULAR; INTRAVENOUS at 05:21

## 2020-05-09 RX ADMIN — GABAPENTIN 100 MG: 100 CAPSULE ORAL at 21:14

## 2020-05-09 RX ADMIN — AZITHROMYCIN MONOHYDRATE 500 MG: 500 INJECTION, POWDER, LYOPHILIZED, FOR SOLUTION INTRAVENOUS at 12:09

## 2020-05-09 RX ADMIN — POLYETHYLENE GLYCOL 3350 17 G: 17 POWDER, FOR SOLUTION ORAL at 12:01

## 2020-05-09 RX ADMIN — HEPARIN SODIUM 5000 UNITS: 5000 INJECTION, SOLUTION INTRAVENOUS; SUBCUTANEOUS at 21:14

## 2020-05-09 RX ADMIN — FUROSEMIDE 40 MG: 10 INJECTION, SOLUTION INTRAMUSCULAR; INTRAVENOUS at 21:14

## 2020-05-09 RX ADMIN — MULTIPLE VITAMINS W/ MINERALS TAB 1 TABLET: TAB at 12:02

## 2020-05-09 RX ADMIN — DILTIAZEM HYDROCHLORIDE 60 MG: 60 TABLET, FILM COATED ORAL at 14:17

## 2020-05-09 RX ADMIN — GUAIFENESIN 600 MG: 600 TABLET, EXTENDED RELEASE ORAL at 21:14

## 2020-05-09 RX ADMIN — FUROSEMIDE 40 MG: 10 INJECTION, SOLUTION INTRAMUSCULAR; INTRAVENOUS at 05:21

## 2020-05-09 RX ADMIN — HEPARIN SODIUM 5000 UNITS: 5000 INJECTION, SOLUTION INTRAVENOUS; SUBCUTANEOUS at 14:17

## 2020-05-09 RX ADMIN — POTASSIUM CHLORIDE 20 MEQ: 1500 TABLET, EXTENDED RELEASE ORAL at 12:02

## 2020-05-09 RX ADMIN — METHYLPREDNISOLONE SODIUM SUCCINATE 125 MG: 125 INJECTION, POWDER, FOR SOLUTION INTRAMUSCULAR; INTRAVENOUS at 02:51

## 2020-05-09 RX ADMIN — GUAIFENESIN 600 MG: 600 TABLET, EXTENDED RELEASE ORAL at 12:02

## 2020-05-09 RX ADMIN — Medication 2 PUFF: at 20:59

## 2020-05-09 RX ADMIN — METHYLPREDNISOLONE SODIUM SUCCINATE 40 MG: 40 INJECTION, POWDER, FOR SOLUTION INTRAMUSCULAR; INTRAVENOUS at 12:01

## 2020-05-09 RX ADMIN — SODIUM CHLORIDE, PRESERVATIVE FREE 10 ML: 5 INJECTION INTRAVENOUS at 12:03

## 2020-05-09 RX ADMIN — METHYLPREDNISOLONE SODIUM SUCCINATE 40 MG: 40 INJECTION, POWDER, FOR SOLUTION INTRAMUSCULAR; INTRAVENOUS at 21:14

## 2020-05-09 RX ADMIN — PRAVASTATIN SODIUM 10 MG: 10 TABLET ORAL at 21:14

## 2020-05-09 RX ADMIN — ALBUTEROL SULFATE 2 PUFF: 90 AEROSOL, METERED RESPIRATORY (INHALATION) at 20:58

## 2020-05-09 RX ADMIN — METHYLPREDNISOLONE SODIUM SUCCINATE 40 MG: 40 INJECTION, POWDER, FOR SOLUTION INTRAMUSCULAR; INTRAVENOUS at 17:56

## 2020-05-09 RX ADMIN — FUROSEMIDE 40 MG: 10 INJECTION, SOLUTION INTRAMUSCULAR; INTRAVENOUS at 12:01

## 2020-05-09 RX ADMIN — DILTIAZEM HYDROCHLORIDE 60 MG: 60 TABLET, FILM COATED ORAL at 21:14

## 2020-05-09 RX ADMIN — LOSARTAN POTASSIUM 100 MG: 100 TABLET, FILM COATED ORAL at 12:02

## 2020-05-09 RX ADMIN — CLOPIDOGREL BISULFATE 75 MG: 75 TABLET ORAL at 12:02

## 2020-05-09 RX ADMIN — GABAPENTIN 100 MG: 100 CAPSULE ORAL at 12:02

## 2020-05-09 RX ADMIN — CARVEDILOL 3.12 MG: 3.12 TABLET, FILM COATED ORAL at 12:02

## 2020-05-09 RX ADMIN — SODIUM CHLORIDE, PRESERVATIVE FREE 10 ML: 5 INJECTION INTRAVENOUS at 21:18

## 2020-05-09 ASSESSMENT — PAIN SCALES - GENERAL
PAINLEVEL_OUTOF10: 0
PAINLEVEL_OUTOF10: 0

## 2020-05-09 NOTE — CONSULTS
Will dictate full note--thx     [id number]   Rapid corona   Ct chest also   Change to lasix tid   Change to cardizem from coreg

## 2020-05-09 NOTE — H&P
in the ER. Patient is being tested for COVID. PAST MEDICAL HISTORY PAST SURGICAL HISTORY   coronary artery disease, severe mitral regurgitation, diastolic congestive heart failure, hypertension, hyperlipidemia, peripheral vascular disease status post aorto bifemoral bypass, overactive bladder, CKD stage III, brain aneurysm status post coiling, obstructive sleep apnea, refused CPAP in the past, COPD, on oxygen at night Cardiac cath, cholecystectomy, brain aneurysm surgery   FAMILY HISTORY SOCIAL HISTORY    Reviewed and noncontributory quit smoking, denies any alcohol or illicit drug abuse   MEDICATIONS ALLERGIES    As per EMR patient is on amlodipine 2.5 mg nightly, pravastatin 10 mg daily, carvedilol 3.125 mg twice daily, albuterol HFA PRN, calcium plus vitamin D3 200 mg daily, Tylenol 650 mg as needed, multivitamins daily, Plavix 75 mg daily, cilostazol 100 mg daily.   Lipitor, sulfa antibiotics, Zocor       PAST MEDICAL, SURGICAL, FAMILY, and SOCIAL HISTORY         Past Medical History:   Diagnosis Date    [facility] failure ([facility] 4/17/2017    Acute respiratory failure ([facility] 11/2018    Anemia     per old chart Hgb 7.5 on 3/8/2019( had transfusion)    Anemia, unspecified     Brain aneurysm     NO MRI--Hx of coiling at [facility] CAD (coronary artery disease)     Carotid stenosis, bilateral 11/2018    Severe    Cerebral artery occlusion with cerebral infarction [facility])     \"after cath, had slight stroke or TIA and affected my vision, had double vision lasted approx 24 hours and no trouble since then\"    CHF (congestive heart failure) ([facility]     \"get pulmonary edema from a bad heart valve and CHF\" follow with Dr Francisca Garcia    Chronic diarrhea     Chronic kidney disease     \"follow with Dr Nichole Stafford" \"creatine level goes up and down\"    COPD (chronic obstructive pulmonary disease) ([facility]     follows with Dr Millie Robison pulmonary edema ([facility] 11/2018    \"have hx of this so they have to watch the IV fluid\"    capsule 0    guaiFENesin (MUCINEX) 600 MG extended release tablet Take 1 tablet by mouth 2 times daily 20 tablet 0    gabapentin (NEURONTIN) 100 MG capsule Take 1 capsule by mouth 2 times daily for 30 days. 60 capsule 0    losartan (COZAAR) 100 MG tablet Take 1 tablet by mouth daily 90 tablet 5    potassium chloride (KLOR-CON M) 20 MEQ extended release tablet Take 1 tablet by mouth daily 30 tablet 5    esomeprazole (NEXIUM) 40 MG delayed release capsule Take 1 capsule by mouth every morning (before breakfast) 90 capsule 3    torsemide (DEMADEX) 10 MG tablet Take 2 tablets by mouth 2 times daily 360 tablet 3    amLODIPine (NORVASC) 2.5 MG tablet Take 1 tablet by mouth daily (Patient taking differently: Take 2.5 mg by mouth nightly ) 90 tablet 1    pravastatin (PRAVACHOL) 40 MG tablet Take 1 tablet by mouth daily (Patient taking differently: Take 10 mg by mouth daily ) 30 tablet 0    carvedilol (COREG) 3.125 MG tablet Take 1 tablet by mouth 2 times daily (with meals) 60 tablet 0    albuterol sulfate HFA (PROAIR HFA) 108 (90 Base) MCG/ACT inhaler Inhale 2 puffs into the lungs every 6 hours as needed for Wheezing 1 Inhaler 0    Calcium Carb-Cholecalciferol (CALCIUM + D3 PO) Take 1,200 mg by mouth daily      acetaminophen (TYLENOL ARTHRITIS PAIN) 650 MG extended release tablet Take 1,300 mg by mouth as needed for Pain      Multiple Vitamins-Minerals (MULTIVITAMIN ADULT PO) Take by mouth daily      clopidogrel (PLAVIX) 75 MG tablet Take 75 mg by mouth daily.  Cilostazol (PLETAL PO) Take 100 mg by mouth daily            Allergies  Allergies   Allergen Reactions    Lipitor [Atorvastatin]     Sulfa Antibiotics Hives    Zocor [Simvastatin]        REVIEW OF SYSTEMS   Within above limitations. 14 point review of systems reviewed. Pertinent positive or negative as per HPI or otherwise negative per 14 point systems review.       PHYSICAL EXAM     Wt Readings from Last 3 Encounters:   05/09/20 105 lb (47.6 Albumin Latest Ref Range: 3.4 - 5.0 GM/DL 3.9   Alk Phos Latest Ref Range: 40 - 128 IU/L 100   ALT Latest Ref Range: 10 - 40 U/L 15   AST Latest Ref Range: 15 - 37 IU/L 34   Bilirubin Latest Ref Range: 0.0 - 1.0 MG/DL 0.3   WBC Latest Ref Range: 4.0 - 10.5 K/CU MM 15.6 (H)   RBC Latest Ref Range: 4.2 - 5.4 M/CU MM 3.41 (L)   Hemoglobin Quant Latest Ref Range: 12.5 - 16.0 GM/DL 10.6 (L)   Hematocrit Latest Ref Range: 37 - 47 % 34.3 (L)   MCV Latest Ref Range: 78 - 100 .6 (H)   MCH Latest Ref Range: 27 - 31 PG 31.1 (H)   MCHC Latest Ref Range: 32.0 - 36.0 % 30.9 (L)   MPV Latest Ref Range: 7.5 - 11.1 FL 10.1   RDW Latest Ref Range: 11.7 - 14.9 % 14.1   Platelet Count Latest Ref Range: 140 - 440 K/CU  (H)   Lymphocyte % Latest Ref Range: 24 - 44 % 40.4   Monocytes % Latest Ref Range: 0 - 4 % 6.5 (H)   Eosinophils % Latest Ref Range: 0 - 3 % 5.8 (H)   Basophils % Latest Ref Range: 0 - 1 % 0.8   Lymphocytes Absolute Latest Units: K/CU MM 6.3   Monocytes Absolute Latest Units: K/CU MM 1.0   Eosinophils Absolute Latest Units: K/CU MM 0.9   Basophils Absolute Latest Units: K/CU MM 0.1   Differential Type Unknown AUTOMATED DIFFERENTIAL   Segs Relative Latest Ref Range: 36 - 66 % 45.7   Segs Absolute Latest Units: K/CU MM 7.1   Nucleated RBC % Latest Units: % 0.0   Immature Neutrophil % Latest Ref Range: 0 - 0.43 % 0.8 (H)   Total Immature Neutrophil Latest Units: K/CU MM 0.13   Total Nucleated RBC Latest Units: K/CU MM 0.0   COVID-19 Unknown Rpt   Base Excess Latest Ref Range: 0 - 2.4  12 (H)   pH, Oral Latest Ref Range: 7.32 - 7.42  7.09 (L)   pCO2, Oral Latest Ref Range: 38 - 52 mmHG 61 (H)   pO2, Oral Latest Ref Range: 28 - 48 mmHG 76 (H)   HCO3, Venous Latest Ref Range: 19 - 25 MMOL/L 18.5 (L)   O2 Sat, Oral Latest Ref Range: 50 - 70 % 88.3 (H)     Recent Imaging    VL DUP LOWER EXTREMITY VENOUS LEFT [[id number]] Collected: 05/09/20 0525      Order Status: Completed Updated: 05/09/20 0529     Narrative:       leukocytosis. -Urine Streptococcus, Legionella antigen ordered, sputum culture ordered  -Continue ceftriaxone, azithromycin    5. Presumptive COVID:  -Sample sent from the ER  -Isolation precautions    6. Severe MR   -DARLYN 11/2018     7. Brain aneurysm (left parasellar) - [facility] 2006     8. Severe atherosclerotic stenosis of the proximal arch vessels and distal left common carotid artery     9. 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 HTN -continue amlodipine 2.5 mg nightly, carvedilol 3.125 mg twice daily, losartan 100 mg daily    11. HLD -continue pravastatin     12. History of TIA during heart cath 2005     13. PAD s/p aortofemoral bypass in the past - apparently on the left side per cardiology note - done in 1978 at [facility] in [facility]  -she is on Plavix apparently for PAD     14. Vertebral artery stenosis - severe diffuse stenosis was seen in the right vertebral artery      15. Obstructive sleep apnea-patient refused CPAP in the past     16. CKD stage III  -Creatinine at baseline. DVT Prophylaxis: Heparin SQ.  GI Prophylaxis: protonix  Code Status: FULL. Discussed with the patient.     Case d/w ED physician    Tank Deluca MD  Hospitalist, Internal Medicine  5/9/2020 at 5:45 AM

## 2020-05-09 NOTE — CONSULTS
HISTORY:  She does not smoke, does not drink. ALLERGIES:  LIPITOR, SULFA, and ZOCOR. MEDICATIONS AT HOME:  She is on Tessalon Perles, Mucinex, Neurontin,  Cozaar 100 mg a day, potassium, Demadex 20 mg b.i.d., Norvasc 2.5 mg  daily, Pravachol, Coreg, and Pletal.    PHYSICAL EXAMINATION:  GENERAL:  The patient is awake and alert, answering questions, not in  acute distress. VITAL SIGNS:  Temperature afebrile, pulse is in 110s, blood pressure  146/115. HEENT:  Head is normocephalic and atraumatic. Pupils are equal and  reactive. CHEST:  Equal expansion. Diffuse wheezing and rhonchi present. HEART:  Regular rhythm, tachycardic. ABDOMEN:  Soft and nontender. Bowel sounds present. No  hepatosplenomegaly or guarding appreciated. LABORATORY DATA:  Creatinine is 1.9, which is her baseline. BNP is  10,000. Troponins are negative. LFTs are normal.  CBC is within normal  range. INR is 0.95.  D-dimer is 909. Fibrinogen is 561. Chest x-ray  has no acute process noted except bilateral interstitial airspace  disease noted. Ultrasound shows no DVT in the left leg, only in one  leg. IMPRESSION AND PLAN:  This is an 82-year-old female patient with a  history of having significant valvular heart disease, comes in with  shortness of breath, possibly has pneumonia present, and heart failure  on top of it. From a cardiac stand, I will go ahead and diurese her. We will repeat echo and will make further recommendations based on that.         Sandra Ruvalcaba MD    D: 05/09/2020 12:14:17       T: 05/09/2020 14:33:28     [email or web address]  Job#: [id number]     Doc#: [id number]    CC:

## 2020-05-09 NOTE — ED TRIAGE NOTES
Pt presents to ED via EMS for respiratory distress. Pt has COPD hx. Pt was found at home without oxygen on. EMS was unable to get SPO2 above 85%. Pt is on 15L Non-breather. Pt is alert and talking. Pt states she has been feeling bad the past couple weeks.

## 2020-05-09 NOTE — ED PROVIDER NOTES
I independently examined and evaluated Tommy Patterson. In brief, 82-year-old female who presents with respiratory distress and hypoxia. Has a history of COPD and is on 2 L nasal cannula baseline. Indicates she is had increasing shortness of breath for couple of weeks. Has a cough productive of phlegm without fever or chills. No known ill contacts. Focused exam revealed the patient appears ill and uncomfortable. Mucous membranes are moist. Speech is clear. Breathing is labored. Wheezing throughout. Sats in the 80's on NRB. Skin is dry. Mental status is normal. The patient moves all extremities and is without facial droop. Left compared to the right lower extremity. Symmetric lower extremity pulses. EKG shows sinus tachycardia at 116. Normal axis with poor R wave progression. No ST elevation or depression. No acute change from prior tracing. ED course: On arrival, patient is initially given Solu-Medrol. Chest x-ray is suggestive of multifocal pneumonia versus edema. As she does have a leukocytosis, she is covered with antibiotics for community-acquired infection. COVID is also on the differential given the current pandemic, and a test is obtained in the emergency department. BNP also noted to be elevated; she may also have underlying CHF and is given Lasix. Due to hypoxia and increased work of breathing, patient is placed on BiPAP. With this, she is much more relaxed and comfortable. Mentation remains clear. Agree with plan for admission for ongoing evaluation. I did don/doff appropriate PPE, as recommended by the health facility/national standard best practice, during my bedside interactions with the patient. Final Impression:  1. Pneumonia due to organism    2. Hypoxia    3.  Chronic obstructive pulmonary disease, unspecified COPD type ([facility]      DISPOSITION Decision To Admit 05/09/2020 04:28:28 AM      All diagnostic, treatment, and disposition decisions were made by myself in conjunction with the advanced practice provider. For all further details of the patient's emergency department visit, please see the advanced practice provider's documentation. Comment: Please note this report has been produced using speech recognition software and may contain errors related to that system including errors in grammar, punctuation, and spelling, as well as words and phrases that may be inappropriate. If there are any questions or concerns please feel free to contact the dictating provider for clarification.            [place] Headings, DO  05/09/20 4207

## 2020-05-09 NOTE — CONSULTS
Pulmonary Consult Note      Reason for Consult: BIPAP mgmt, COPD, CHF  Requesting Physician: Dr. Keron Goldsmith:   CHIEF COMPLAINT :SOB    Patient Active Problem List    Diagnosis Date Noted    Acute on chronic respiratory failure with hypoxia and hypercapnia ([facility] 05/09/2020    Iron deficiency anemia 03/08/2019    Gastrointestinal hemorrhage associated with anorectal source 03/08/2019    CASS (acute kidney injury) ([facility] 03/06/2019    Chronic kidney disease (CKD) stage G3b/A3, moderately decreased glomerular filtration rate (GFR) between 30-44 mL/min/1.73 square meter and albuminuria creatinine ratio greater than 300 mg/g ([facility] 02/01/2019    Valvular disease 11/26/2018     Overview Note:     Replacing deprecated diagnoses      Acute respiratory failure with hypoxia ([facility] 11/08/2018    Flash pulmonary edema ([facility] 11/05/2018    Chronic kidney disease (CKD) stage G1/A3, glomerular filtration rate (GFR) equal to or greater than 90 mL/min/1.73 square meter and albuminuria creatinine ratio greater than 300 mg/g 03/01/2018    Chronic kidney disease (CKD) stage G2/A3, mildly decreased glomerular filtration rate (GFR) between 60-89 mL/min/1.73 square meter and albuminuria creatinine ratio greater than 300 mg/g 04/17/2017    Proteinuria 02/23/2017    HTN (hypertension) 02/23/2017    OAB (overactive bladder) 02/23/2017    Hyperlipidemia 02/23/2017    PAD (peripheral artery disease) ([facility] 02/23/2017        HPI:                The patient is a 82 y.o. female with significant past medical history of severe mitral regurgitation, diastolic congestive heart failure, hypertension, hyperlipidemia, peripheral vascular disease status post aorto bifemoral bypass, overactive bladder, CKD stage III, brain aneurysm status post coiling, obstructive sleep apnea, refused CPAP in the past, COPD, on oxygen at [facility]  presents with complaints of shortness of breath for almost a week which is progressively getting worse. Patient reports episode being worse last night and had to call the squad. As per the documentation patient was saturating 68% when the squad arrived. They put her on nonrebreather, oxygen saturation was up to 85%. Reported patient having crackles and wheezing on examination. Patient reported having productive cough, clear sputum, denied any fever, chills. Denied any chest pain, denied any abdominal pain, denied any urinary complaints, denied any constipation or diarrhea. Patient reported having swelling in her left lower extremity. Patient reports being compliant with her medications. Patient does not use any inhalers. At presentation patient was noted to have /87, , RR 37, temperature 98.2, saturating 85% on nonrebreather. Labs significant for BUN 32, CO2 20, creatinine 1.9, lactic acid 6.5, glucose 347, pro calcitonin 0.042, proBNP 10,595, CRP 8, troponin less than 0.010, WBC 15.6, hemoglobin 10.6, platelets 830. VBG revealed pH 7.09, PCO2 61, PO2 76. Chest x-ray revealed bilateral interstitial and airspace disease. Differential includes edema versus multifocal infection. Patient is currently on BiPAP. Patient received IV Lasix, IV methylprednisolone, ceftriaxone, azithromycin in the ER. Patient is being tested for COVID.     Past Medical History:      Diagnosis Date    Acute renal failure ([address] 4/17/2017    Acute respiratory failure ([address] 11/2018    Anemia     per old chart Hgb 7.5 on 3/8/2019( had transfusion)    Anemia, unspecified     Brain aneurysm     NO MRI--[facility] of coiling at [facility] CAD (coronary artery disease)     Carotid stenosis, bilateral 11/2018    Severe    Cerebral artery occlusion with cerebral infarction [facility])     \"after cath, had slight stroke or TIA and affected my vision, had double vision lasted approx 24 hours and no trouble since then\"    CHF (congestive heart failure) ([facility]     \"get pulmonary edema from a bad heart valve and CHF\" follow with Dr Jeri Clements reviewed  CBC with Differential:    Lab Results   Component Value Date    WBC 15.6 05/09/2020    RBC 3.41 05/09/2020    HGB 10.6 05/09/2020    HCT 34.3 05/09/2020     05/09/2020    .6 05/09/2020    MCH 31.1 05/09/2020    MCHC 30.9 05/09/2020    RDW 14.1 05/09/2020    SEGSPCT 45.7 05/09/2020    LYMPHOPCT 40.4 05/09/2020    MONOPCT 6.5 05/09/2020    EOSPCT 3.6 08/29/2011    BASOPCT 0.8 05/09/2020    MONOSABS 1.0 05/09/2020    LYMPHSABS 6.3 05/09/2020    EOSABS 0.9 05/09/2020    BASOSABS 0.1 05/09/2020    DIFFTYPE AUTOMATED DIFFERENTIAL 05/09/2020     BMP:    Lab Results   Component Value Date     05/09/2020    K 4.3 05/09/2020    CL 99 05/09/2020    CO2 20 05/09/2020    BUN 32 05/09/2020    CREATININE 1.9 05/09/2020    CALCIUM 8.6 05/09/2020    GFRAA 31 05/09/2020    LABGLOM 25 05/09/2020    GLUCOSE 347 05/09/2020     Hepatic Function Panel:    Lab Results   Component Value Date    ALKPHOS 100 05/09/2020    ALT 15 05/09/2020    AST 34 05/09/2020    PROT 6.7 05/09/2020    PROT 6.3 11/10/2012    BILITOT 0.3 05/09/2020    BILIDIR 0.2 05/08/2018    IBILI 0.1 05/08/2018     ABG:    Lab Results   Component Value Date    IFY1TUT 27.7 11/08/2018    VWX4MSJ 39.0 11/08/2018    PO2ART 62 11/08/2018       Cultures:   Pending    Radiology Review:      Bilateral interstitial and airspace disease.  Differential includes edema   versus multifocal infection           Assessment/Plan       Patient Active Problem List    Diagnosis Date Noted    Acute on chronic respiratory failure with hypoxia and hypercapnia ([facility] 05/09/2020    Iron deficiency anemia 03/08/2019    Gastrointestinal hemorrhage associated with anorectal source 03/08/2019    CASS (acute kidney injury) ([facility] 03/06/2019    Chronic kidney disease (CKD) stage G3b/A3, moderately decreased glomerular filtration rate (GFR) between 30-44 mL/min/1.73 square meter and albuminuria creatinine ratio greater than 300 mg/g ([facility]) 02/01/2019    Valvular disease 11/26/2018     Overview Note:     Replacing deprecated diagnoses      Acute respiratory failure with hypoxia ([facility] 11/08/2018    Flash pulmonary edema ([facility]) 11/05/2018    Chronic kidney disease (CKD) stage G1/A3, glomerular filtration rate (GFR) equal to or greater than 90 mL/min/1.73 square meter and albuminuria creatinine ratio greater than 300 mg/g 03/01/2018    Chronic kidney disease (CKD) stage G2/A3, mildly decreased glomerular filtration rate (GFR) between 60-89 mL/min/1.73 square meter and albuminuria creatinine ratio greater than 300 mg/g 04/17/2017    Proteinuria 02/23/2017    HTN (hypertension) 02/23/2017    OAB (overactive bladder) 02/23/2017    Hyperlipidemia 02/23/2017    PAD (peripheral artery disease) ([facility] 02/23/2017     CASS on CKD  AG Metabolic alkalosis  Leukocytosis  Pulmonary congestion  Moderate to severe MR  ? AS  Moderate TR  Lactic acidosis  COPD exac  CHF decompensated    PLAN  1. Abx  2. F/u C&S  3. Inhalers  4. Keep sats > 92%  5. Diuresis  6. Solumedrol  7. CHF optimization  8. DVT and GI prophylaxis  9. F/u COVID-19 result  10.  C/w present management          Electronically signed by Morena Watts MD on 5/9/2020 at 11:56 AM

## 2020-05-10 ENCOUNTER — APPOINTMENT (OUTPATIENT)
Dept: GENERAL RADIOLOGY | Age: 85
DRG: 291 | End: 2020-05-10
Payer: MEDICARE

## 2020-05-10 ENCOUNTER — APPOINTMENT (OUTPATIENT)
Dept: CT IMAGING | Age: 85
DRG: 291 | End: 2020-05-10
Payer: MEDICARE

## 2020-05-10 LAB
ALBUMIN SERPL-MCNC: 3.9 GM/DL (ref 3.4–5)
ALP BLD-CCNC: 90 IU/L (ref 40–128)
ALT SERPL-CCNC: 17 U/L (ref 10–40)
ANION GAP SERPL CALCULATED.3IONS-SCNC: 14 MMOL/L (ref 4–16)
APTT: 39.3 SECONDS (ref 25.1–37.1)
AST SERPL-CCNC: 24 IU/L (ref 15–37)
BASOPHILS ABSOLUTE: 0 K/CU MM
BASOPHILS RELATIVE PERCENT: 0.2 % (ref 0–1)
BILIRUB SERPL-MCNC: 0.3 MG/DL (ref 0–1)
BUN BLDV-MCNC: 38 MG/DL (ref 6–23)
CALCIUM SERPL-MCNC: 9.1 MG/DL (ref 8.3–10.6)
CHLORIDE BLD-SCNC: 98 MMOL/L (ref 99–110)
CO2: 28 MMOL/L (ref 21–32)
CREAT SERPL-MCNC: 1.8 MG/DL (ref 0.6–1.1)
D DIMER: 449 NG/ML(DDU)
DIFFERENTIAL TYPE: ABNORMAL
EMERGENT DISEASE RESULT: NOT DETECTED
EOSINOPHILS ABSOLUTE: 0 K/CU MM
EOSINOPHILS RELATIVE PERCENT: 0 % (ref 0–3)
FIBRINOGEN LEVEL: 559 MG/DL (ref 196.9–442.1)
GFR AFRICAN AMERICAN: 33 ML/MIN/1.73M2
GFR NON-AFRICAN AMERICAN: 27 ML/MIN/1.73M2
GLUCOSE BLD-MCNC: 160 MG/DL (ref 70–99)
HCT VFR BLD CALC: 31.7 % (ref 37–47)
HEMOGLOBIN: 10.2 GM/DL (ref 12.5–16)
IMMATURE NEUTROPHIL %: 1.1 % (ref 0–0.43)
INR BLD: 0.98 INDEX
LEGIONELLA URINARY AG: NEGATIVE
LYMPHOCYTES ABSOLUTE: 0.8 K/CU MM
LYMPHOCYTES RELATIVE PERCENT: 8.1 % (ref 24–44)
MCH RBC QN AUTO: 30.8 PG (ref 27–31)
MCHC RBC AUTO-ENTMCNC: 32.2 % (ref 32–36)
MCV RBC AUTO: 95.8 FL (ref 78–100)
MONOCYTES ABSOLUTE: 0.4 K/CU MM
MONOCYTES RELATIVE PERCENT: 3.5 % (ref 0–4)
NUCLEATED RBC %: 0 %
PDW BLD-RTO: 14.2 % (ref 11.7–14.9)
PLATELET # BLD: 384 K/CU MM (ref 140–440)
PMV BLD AUTO: 10 FL (ref 7.5–11.1)
POTASSIUM SERPL-SCNC: 4.4 MMOL/L (ref 3.5–5.1)
PRO-BNP: ABNORMAL PG/ML
PROTHROMBIN TIME: 11.9 SECONDS (ref 11.7–14.5)
RBC # BLD: 3.31 M/CU MM (ref 4.2–5.4)
SEGMENTED NEUTROPHILS ABSOLUTE COUNT: 8.9 K/CU MM
SEGMENTED NEUTROPHILS RELATIVE PERCENT: 87.1 % (ref 36–66)
SODIUM BLD-SCNC: 140 MMOL/L (ref 135–145)
STREP PNEUMONIAE ANTIGEN: NORMAL
TOTAL IMMATURE NEUTOROPHIL: 0.11 K/CU MM
TOTAL NUCLEATED RBC: 0 K/CU MM
TOTAL PROTEIN: 6.1 GM/DL (ref 6.4–8.2)
WBC # BLD: 10.2 K/CU MM (ref 4–10.5)

## 2020-05-10 PROCEDURE — 6360000002 HC RX W HCPCS: Performed by: INTERNAL MEDICINE

## 2020-05-10 PROCEDURE — 80048 BASIC METABOLIC PNL TOTAL CA: CPT

## 2020-05-10 PROCEDURE — 85610 PROTHROMBIN TIME: CPT

## 2020-05-10 PROCEDURE — 2580000003 HC RX 258: Performed by: INTERNAL MEDICINE

## 2020-05-10 PROCEDURE — 99232 SBSQ HOSP IP/OBS MODERATE 35: CPT | Performed by: INTERNAL MEDICINE

## 2020-05-10 PROCEDURE — 71250 CT THORAX DX C-: CPT

## 2020-05-10 PROCEDURE — 6370000000 HC RX 637 (ALT 250 FOR IP): Performed by: INTERNAL MEDICINE

## 2020-05-10 PROCEDURE — 85730 THROMBOPLASTIN TIME PARTIAL: CPT

## 2020-05-10 PROCEDURE — 83880 ASSAY OF NATRIURETIC PEPTIDE: CPT

## 2020-05-10 PROCEDURE — 85025 COMPLETE CBC W/AUTO DIFF WBC: CPT

## 2020-05-10 PROCEDURE — 85384 FIBRINOGEN ACTIVITY: CPT

## 2020-05-10 PROCEDURE — 2700000000 HC OXYGEN THERAPY PER DAY

## 2020-05-10 PROCEDURE — 71045 X-RAY EXAM CHEST 1 VIEW: CPT

## 2020-05-10 PROCEDURE — 85379 FIBRIN DEGRADATION QUANT: CPT

## 2020-05-10 PROCEDURE — 94761 N-INVAS EAR/PLS OXIMETRY MLT: CPT

## 2020-05-10 PROCEDURE — 94660 CPAP INITIATION&MGMT: CPT

## 2020-05-10 PROCEDURE — 2140000000 HC CCU INTERMEDIATE R&B

## 2020-05-10 PROCEDURE — 94640 AIRWAY INHALATION TREATMENT: CPT

## 2020-05-10 PROCEDURE — 80053 COMPREHEN METABOLIC PANEL: CPT

## 2020-05-10 RX ORDER — METHYLPREDNISOLONE SODIUM SUCCINATE 40 MG/ML
40 INJECTION, POWDER, LYOPHILIZED, FOR SOLUTION INTRAMUSCULAR; INTRAVENOUS EVERY 12 HOURS
Status: DISCONTINUED | OUTPATIENT
Start: 2020-05-10 | End: 2020-05-11

## 2020-05-10 RX ADMIN — Medication 2 PUFF: at 08:32

## 2020-05-10 RX ADMIN — CEFTRIAXONE SODIUM 1 G: 1 INJECTION, POWDER, FOR SOLUTION INTRAMUSCULAR; INTRAVENOUS at 05:09

## 2020-05-10 RX ADMIN — GUAIFENESIN 600 MG: 600 TABLET, EXTENDED RELEASE ORAL at 08:52

## 2020-05-10 RX ADMIN — MULTIPLE VITAMINS W/ MINERALS TAB 1 TABLET: TAB at 08:52

## 2020-05-10 RX ADMIN — PRAVASTATIN SODIUM 10 MG: 10 TABLET ORAL at 21:47

## 2020-05-10 RX ADMIN — Medication 2 PUFF: at 21:04

## 2020-05-10 RX ADMIN — Medication 2 PUFF: at 15:49

## 2020-05-10 RX ADMIN — DILTIAZEM HYDROCHLORIDE 60 MG: 60 TABLET, FILM COATED ORAL at 05:10

## 2020-05-10 RX ADMIN — METHYLPREDNISOLONE SODIUM SUCCINATE 40 MG: 40 INJECTION, POWDER, FOR SOLUTION INTRAMUSCULAR; INTRAVENOUS at 05:10

## 2020-05-10 RX ADMIN — ALBUTEROL SULFATE 2 PUFF: 90 AEROSOL, METERED RESPIRATORY (INHALATION) at 15:48

## 2020-05-10 RX ADMIN — FUROSEMIDE 40 MG: 10 INJECTION, SOLUTION INTRAMUSCULAR; INTRAVENOUS at 15:20

## 2020-05-10 RX ADMIN — ALBUTEROL SULFATE 2 PUFF: 90 AEROSOL, METERED RESPIRATORY (INHALATION) at 21:05

## 2020-05-10 RX ADMIN — LOSARTAN POTASSIUM 100 MG: 100 TABLET, FILM COATED ORAL at 08:52

## 2020-05-10 RX ADMIN — CLOPIDOGREL BISULFATE 75 MG: 75 TABLET ORAL at 08:52

## 2020-05-10 RX ADMIN — SODIUM CHLORIDE, PRESERVATIVE FREE 10 ML: 5 INJECTION INTRAVENOUS at 08:52

## 2020-05-10 RX ADMIN — METHYLPREDNISOLONE SODIUM SUCCINATE 40 MG: 40 INJECTION, POWDER, FOR SOLUTION INTRAMUSCULAR; INTRAVENOUS at 22:08

## 2020-05-10 RX ADMIN — FUROSEMIDE 40 MG: 10 INJECTION, SOLUTION INTRAMUSCULAR; INTRAVENOUS at 21:45

## 2020-05-10 RX ADMIN — Medication 2 PUFF: at 11:43

## 2020-05-10 RX ADMIN — ALBUTEROL SULFATE 2 PUFF: 90 AEROSOL, METERED RESPIRATORY (INHALATION) at 08:31

## 2020-05-10 RX ADMIN — PANTOPRAZOLE SODIUM 40 MG: 40 TABLET, DELAYED RELEASE ORAL at 05:13

## 2020-05-10 RX ADMIN — HEPARIN SODIUM 5000 UNITS: 5000 INJECTION, SOLUTION INTRAVENOUS; SUBCUTANEOUS at 05:09

## 2020-05-10 RX ADMIN — GABAPENTIN 100 MG: 100 CAPSULE ORAL at 08:52

## 2020-05-10 RX ADMIN — AZITHROMYCIN MONOHYDRATE 500 MG: 500 INJECTION, POWDER, LYOPHILIZED, FOR SOLUTION INTRAVENOUS at 11:33

## 2020-05-10 RX ADMIN — HEPARIN SODIUM 5000 UNITS: 5000 INJECTION, SOLUTION INTRAVENOUS; SUBCUTANEOUS at 21:45

## 2020-05-10 RX ADMIN — GABAPENTIN 100 MG: 100 CAPSULE ORAL at 21:46

## 2020-05-10 RX ADMIN — POTASSIUM CHLORIDE 20 MEQ: 1500 TABLET, EXTENDED RELEASE ORAL at 08:52

## 2020-05-10 RX ADMIN — DILTIAZEM HYDROCHLORIDE 60 MG: 60 TABLET, FILM COATED ORAL at 15:16

## 2020-05-10 RX ADMIN — GUAIFENESIN 600 MG: 600 TABLET, EXTENDED RELEASE ORAL at 22:08

## 2020-05-10 RX ADMIN — FUROSEMIDE 40 MG: 10 INJECTION, SOLUTION INTRAMUSCULAR; INTRAVENOUS at 08:51

## 2020-05-10 RX ADMIN — ALBUTEROL SULFATE 2 PUFF: 90 AEROSOL, METERED RESPIRATORY (INHALATION) at 11:42

## 2020-05-10 RX ADMIN — HEPARIN SODIUM 5000 UNITS: 5000 INJECTION, SOLUTION INTRAVENOUS; SUBCUTANEOUS at 15:16

## 2020-05-10 ASSESSMENT — PAIN SCALES - GENERAL
PAINLEVEL_OUTOF10: 0

## 2020-05-10 NOTE — PROGRESS NOTES
Intravenous Q12H    sodium chloride flush  10 mL Intravenous 2 times per day    heparin (porcine)  5,000 Units Subcutaneous 3 times per day    azithromycin  500 mg Intravenous Q24H    cefTRIAXone (ROCEPHIN) IV  1 g Intravenous Q24H    clopidogrel  75 mg Oral Daily    pantoprazole  40 mg Oral QAM AC    gabapentin  100 mg Oral BID    guaiFENesin  600 mg Oral BID    losartan  100 mg Oral Daily    therapeutic multivitamin-minerals  1 tablet Oral Daily    potassium chloride  20 mEq Oral Daily    pravastatin  10 mg Oral Nightly    albuterol sulfate HFA  2 puff Inhalation 4x daily    And    ipratropium  2 puff Inhalation 4x daily    dilTIAZem  60 mg Oral 3 times per day    furosemide  40 mg Intravenous TID      Infusions:   PRN Meds: sodium chloride flush, 10 mL, PRN  acetaminophen, 650 mg, Q6H PRN  polyethylene glycol, 17 g, Daily PRN  promethazine, 12.5 mg, Q6H PRN    Or  ondansetron, 4 mg, Q6H PRN  acetaminophen, 650 mg, Q6H PRN        Data/Labs:     Recent Labs     05/09/20  0245 05/10/20  0500   WBC 15.6* 10.2   HGB 10.6* 10.2*   HCT 34.3* 31.7*   * 384      Recent Labs     05/09/20  0245 05/10/20  0500    140   K 4.3 4.4   CL 99 98*   CO2 20* 28   BUN 32* 38*   CREATININE 1.9* 1.8*     Recent Labs     05/09/20  0245 05/10/20  0500   AST 34 24   ALT 15 17   BILITOT 0.3 0.3   ALKPHOS 100 90     Recent Labs     05/09/20  1055 05/10/20  0500   INR 0.95 0.98     Recent Labs     05/09/20  0245   TROPONINT <0.010     HgBA1c: No results found for: LABA1C  CALCIUM:  9.1/28 (05/10 0500)    I/O last 3 completed shifts: In: 36 [P.O.:720;  I.V.:10]  Out: 1800 [Urine:1800]    Intake/Output Summary (Last 24 hours) at 5/10/2020 1059  Last data filed at 5/10/2020 0806  Gross per 24 hour   Intake 780 ml   Output 1975 ml   Net -1195 ml

## 2020-05-10 NOTE — PROGRESS NOTES
Net -1195 ml       Medications:   Scheduled Meds:   sodium chloride flush  10 mL Intravenous 2 times per day    heparin (porcine)  5,000 Units Subcutaneous 3 times per day    azithromycin  500 mg Intravenous Q24H    cefTRIAXone (ROCEPHIN) IV  1 g Intravenous Q24H    clopidogrel  75 mg Oral Daily    pantoprazole  40 mg Oral QAM AC    gabapentin  100 mg Oral BID    guaiFENesin  600 mg Oral BID    losartan  100 mg Oral Daily    therapeutic multivitamin-minerals  1 tablet Oral Daily    potassium chloride  20 mEq Oral Daily    pravastatin  10 mg Oral Nightly    albuterol sulfate HFA  2 puff Inhalation 4x daily    And    ipratropium  2 puff Inhalation 4x daily    methylPREDNISolone  40 mg Intravenous Q6H    dilTIAZem  60 mg Oral 3 times per day    furosemide  40 mg Intravenous TID      Infusions:     PRN Meds:  sodium chloride flush, [DISCONTINUED] acetaminophen **OR** acetaminophen, polyethylene glycol, promethazine **OR** ondansetron, acetaminophen **OR** [DISCONTINUED] acetaminophen       Physical Exam:  Vitals:    05/10/20 0834   BP:    Pulse:    Resp: 17   Temp:    SpO2: 92%        General: awake alert   Chest: Nontender  Cardiac: sinus   Lungs:Clear to auscultation and percussion. Abdomen: Soft, NT, ND, +BS  Extremities: no edema   Vascular:  Equal 2+ peripheral pulses.         Lab Data:  CBC:   Recent Labs     05/09/20  0245 05/10/20  0500   WBC 15.6* 10.2   HGB 10.6* 10.2*   HCT 34.3* 31.7*   .6* 95.8   * 384     BMP:   Recent Labs     05/09/20 0245 05/10/20  0500    140   K 4.3 4.4   CL 99 98*   CO2 20* 28   BUN 32* 38*   CREATININE 1.9* 1.8*     LIVER PROFILE:   Recent Labs     05/09/20 0245 05/10/20  0500   AST 34 24   ALT 15 17   BILITOT 0.3 0.3   ALKPHOS 100 90     PT/INR:   Recent Labs     05/09/20  1055 05/10/20  0500   PROTIME 11.5* 11.9   INR 0.95 0.98     APTT:   Recent Labs     05/09/20  1055 05/10/20  0500   APTT 32.4 39.3*     BNP:  No results for input(s): BNP

## 2020-05-10 NOTE — PROGRESS NOTES
Gave telephone report to [facility] [address] [facility], all questions answered at this time. Called [facility] for bipap to be in patient room 0548.

## 2020-05-10 NOTE — PROGRESS NOTES
meter and albuminuria creatinine ratio greater than 300 mg/g 04/17/2017    Proteinuria 02/23/2017    HTN (hypertension) 02/23/2017    OAB (overactive bladder) 02/23/2017    Hyperlipidemia 02/23/2017    PAD (peripheral artery disease) ([facility] 02/23/2017   Patient is lying in the bed. She is in mild resp distress      CASS on CKD  AG Metabolic alkalosis  Leukocytosis  Pulmonary congestion  Moderate to severe MR  ? AS  Moderate TR  Lactic acidosis  COPD exac  CHF decompensated       1. Await COVID result  2. Abx  3. F/u C&s  4. Diuresis  5. Decrease Solumderol  6. Inhalers  7. CHF optimization  8. CXR now  5. Keep sats > 92%  10. C/w present management    No follow-ups on file.     Electronically signed by Derick Arceo MD on 5/10/2020 at 10:54 AM

## 2020-05-11 ENCOUNTER — APPOINTMENT (OUTPATIENT)
Dept: GENERAL RADIOLOGY | Age: 85
DRG: 291 | End: 2020-05-11
Payer: MEDICARE

## 2020-05-11 LAB
ANION GAP SERPL CALCULATED.3IONS-SCNC: 14 MMOL/L (ref 4–16)
BUN BLDV-MCNC: 47 MG/DL (ref 6–23)
CALCIUM SERPL-MCNC: 8.4 MG/DL (ref 8.3–10.6)
CHLORIDE BLD-SCNC: 98 MMOL/L (ref 99–110)
CO2: 27 MMOL/L (ref 21–32)
CREAT SERPL-MCNC: 1.9 MG/DL (ref 0.6–1.1)
GFR AFRICAN AMERICAN: 31 ML/MIN/1.73M2
GFR NON-AFRICAN AMERICAN: 25 ML/MIN/1.73M2
GLUCOSE BLD-MCNC: 136 MG/DL (ref 70–99)
LV EF: 58 %
LVEF MODALITY: NORMAL
POTASSIUM SERPL-SCNC: 4.2 MMOL/L (ref 3.5–5.1)
PRO-BNP: ABNORMAL PG/ML
PROCALCITONIN: 0.46
REASON FOR REJECTION: NORMAL
REJECTED TEST: NORMAL
SODIUM BLD-SCNC: 139 MMOL/L (ref 135–145)

## 2020-05-11 PROCEDURE — 71045 X-RAY EXAM CHEST 1 VIEW: CPT

## 2020-05-11 PROCEDURE — 6360000002 HC RX W HCPCS: Performed by: INTERNAL MEDICINE

## 2020-05-11 PROCEDURE — 83880 ASSAY OF NATRIURETIC PEPTIDE: CPT

## 2020-05-11 PROCEDURE — 2580000003 HC RX 258: Performed by: INTERNAL MEDICINE

## 2020-05-11 PROCEDURE — 6370000000 HC RX 637 (ALT 250 FOR IP): Performed by: INTERNAL MEDICINE

## 2020-05-11 PROCEDURE — 94761 N-INVAS EAR/PLS OXIMETRY MLT: CPT

## 2020-05-11 PROCEDURE — 84145 PROCALCITONIN (PCT): CPT

## 2020-05-11 PROCEDURE — 99232 SBSQ HOSP IP/OBS MODERATE 35: CPT | Performed by: INTERNAL MEDICINE

## 2020-05-11 PROCEDURE — 80048 BASIC METABOLIC PNL TOTAL CA: CPT

## 2020-05-11 PROCEDURE — 94640 AIRWAY INHALATION TREATMENT: CPT

## 2020-05-11 PROCEDURE — 93306 TTE W/DOPPLER COMPLETE: CPT

## 2020-05-11 PROCEDURE — 2700000000 HC OXYGEN THERAPY PER DAY

## 2020-05-11 PROCEDURE — 2140000000 HC CCU INTERMEDIATE R&B

## 2020-05-11 RX ORDER — FUROSEMIDE 40 MG/1
40 TABLET ORAL 2 TIMES DAILY
Status: DISCONTINUED | OUTPATIENT
Start: 2020-05-11 | End: 2020-05-12 | Stop reason: HOSPADM

## 2020-05-11 RX ORDER — METHYLPREDNISOLONE SODIUM SUCCINATE 40 MG/ML
40 INJECTION, POWDER, LYOPHILIZED, FOR SOLUTION INTRAMUSCULAR; INTRAVENOUS DAILY
Status: DISCONTINUED | OUTPATIENT
Start: 2020-05-12 | End: 2020-05-12

## 2020-05-11 RX ADMIN — SODIUM CHLORIDE, PRESERVATIVE FREE 10 ML: 5 INJECTION INTRAVENOUS at 00:29

## 2020-05-11 RX ADMIN — POTASSIUM CHLORIDE 20 MEQ: 1500 TABLET, EXTENDED RELEASE ORAL at 09:55

## 2020-05-11 RX ADMIN — HEPARIN SODIUM 5000 UNITS: 5000 INJECTION, SOLUTION INTRAVENOUS; SUBCUTANEOUS at 12:46

## 2020-05-11 RX ADMIN — FUROSEMIDE 40 MG: 10 INJECTION, SOLUTION INTRAMUSCULAR; INTRAVENOUS at 09:56

## 2020-05-11 RX ADMIN — ALBUTEROL SULFATE 2 PUFF: 90 AEROSOL, METERED RESPIRATORY (INHALATION) at 19:59

## 2020-05-11 RX ADMIN — FUROSEMIDE 40 MG: 10 INJECTION, SOLUTION INTRAMUSCULAR; INTRAVENOUS at 12:46

## 2020-05-11 RX ADMIN — ALBUTEROL SULFATE 2 PUFF: 90 AEROSOL, METERED RESPIRATORY (INHALATION) at 12:14

## 2020-05-11 RX ADMIN — HEPARIN SODIUM 5000 UNITS: 5000 INJECTION, SOLUTION INTRAVENOUS; SUBCUTANEOUS at 21:10

## 2020-05-11 RX ADMIN — METHYLPREDNISOLONE SODIUM SUCCINATE 40 MG: 40 INJECTION, POWDER, FOR SOLUTION INTRAMUSCULAR; INTRAVENOUS at 09:55

## 2020-05-11 RX ADMIN — DILTIAZEM HYDROCHLORIDE 60 MG: 60 TABLET, FILM COATED ORAL at 21:10

## 2020-05-11 RX ADMIN — GABAPENTIN 100 MG: 100 CAPSULE ORAL at 21:10

## 2020-05-11 RX ADMIN — PANTOPRAZOLE SODIUM 40 MG: 40 TABLET, DELAYED RELEASE ORAL at 06:02

## 2020-05-11 RX ADMIN — CEFTRIAXONE SODIUM 1 G: 1 INJECTION, POWDER, FOR SOLUTION INTRAMUSCULAR; INTRAVENOUS at 06:01

## 2020-05-11 RX ADMIN — SODIUM CHLORIDE, PRESERVATIVE FREE 10 ML: 5 INJECTION INTRAVENOUS at 21:22

## 2020-05-11 RX ADMIN — DILTIAZEM HYDROCHLORIDE 60 MG: 60 TABLET, FILM COATED ORAL at 12:46

## 2020-05-11 RX ADMIN — LOSARTAN POTASSIUM 100 MG: 100 TABLET, FILM COATED ORAL at 09:55

## 2020-05-11 RX ADMIN — GUAIFENESIN 600 MG: 600 TABLET, EXTENDED RELEASE ORAL at 09:55

## 2020-05-11 RX ADMIN — DILTIAZEM HYDROCHLORIDE 60 MG: 60 TABLET, FILM COATED ORAL at 06:02

## 2020-05-11 RX ADMIN — MULTIPLE VITAMINS W/ MINERALS TAB 1 TABLET: TAB at 09:55

## 2020-05-11 RX ADMIN — ALBUTEROL SULFATE 2 PUFF: 90 AEROSOL, METERED RESPIRATORY (INHALATION) at 09:03

## 2020-05-11 RX ADMIN — Medication 2 PUFF: at 19:58

## 2020-05-11 RX ADMIN — CLOPIDOGREL BISULFATE 75 MG: 75 TABLET ORAL at 09:56

## 2020-05-11 RX ADMIN — GABAPENTIN 100 MG: 100 CAPSULE ORAL at 09:55

## 2020-05-11 RX ADMIN — Medication 2 PUFF: at 09:03

## 2020-05-11 RX ADMIN — PRAVASTATIN SODIUM 10 MG: 10 TABLET ORAL at 21:10

## 2020-05-11 RX ADMIN — HEPARIN SODIUM 5000 UNITS: 5000 INJECTION, SOLUTION INTRAVENOUS; SUBCUTANEOUS at 06:01

## 2020-05-11 RX ADMIN — AZITHROMYCIN MONOHYDRATE 500 MG: 500 INJECTION, POWDER, LYOPHILIZED, FOR SOLUTION INTRAVENOUS at 09:55

## 2020-05-11 RX ADMIN — SODIUM CHLORIDE, PRESERVATIVE FREE 10 ML: 5 INJECTION INTRAVENOUS at 09:55

## 2020-05-11 RX ADMIN — GUAIFENESIN 600 MG: 600 TABLET, EXTENDED RELEASE ORAL at 21:10

## 2020-05-11 RX ADMIN — Medication 2 PUFF: at 12:14

## 2020-05-11 RX ADMIN — FUROSEMIDE 40 MG: 40 TABLET ORAL at 16:39

## 2020-05-11 ASSESSMENT — PAIN SCALES - GENERAL
PAINLEVEL_OUTOF10: 0
PAINLEVEL_OUTOF10: 0

## 2020-05-11 NOTE — PROGRESS NOTES
Daily Progress Note    Feeling better  No chest pain heart rate is stable  Will review echo  HTN  Check labs today   Hx of valvular heart dx     Pt. Awake, alert and feeling ok  HR stable, NSR, BP stable  No CP, SOB improving today    Acute on Chronic HFpEF    Hx of Mod-sev MR on DARLYN 11/18    BNP elevated    On Lasix- Good UOP, cont. IV for right now    Weaning down NC    Echo today-will review, may need valve surgery    COPDE    Covid neg. On ABx    On steroids    Pulm following    Increase activity  Will cont. To follow    Echo --Summary--11/18   Left ventricular function is normal , EF is estimated at 50-%.   Moderately dilated left atrium.   Aortic valve is heavily calcified and appears be slightly stenotic.   Mitral valve is heavily calcified and appears to be stenotic.   Moderate to Severe mitral regurgitation is present.   Moderate tricuspid regurgitation.  Rosa Maria Seat is no evidence of thrombus in the Left Atrial Appendage.           The patient had a DARLYN done back in 11/2018. [place] DARLYN at that time had  shown that LV function was preserved.  Aortic valve was heavily  calcified with some aortic stenosis noted.  Mitral valve was heavily  calcified with moderate mitral stenosis and moderate-to-severe mitral  regurgitation noted.  Moderate tricuspid regurgitation was also present.     PAST MEDICAL HISTORY:  The patient has a significant history of valvular  heart disease present including aortic valve, mitral valve, and  tricuspid valve.  She is known to have coronary artery disease.  She had  an angioplasty done in the past.  She also has peripheral vascular  disease.  She had a cutting balloon angioplasty of the right common  femoral artery done and right SFA.  She also had aortofemoral bypass  surgery done in 1989 by Dr. Teresa Figueroa of COPD, renal  insufficiency, coronary artery disease, history of strokes, valvular  heart disease, GERD, and hypertension present.     Objective:   /78   Pulse 90

## 2020-05-11 NOTE — PLAN OF CARE
Problem: Safety:  Goal: Free from accidental physical injury  Description: Free from accidental physical injury  Outcome: Ongoing     Problem: Daily Care:  Goal: Daily care needs are met  Description: Daily care needs are met  Outcome: Ongoing     Problem: Discharge Planning:  Goal: Patients continuum of care needs are met  Description: Patients continuum of care needs are met  Outcome: Ongoing

## 2020-05-11 NOTE — PLAN OF CARE
Problem: Safety:  Goal: Free from accidental physical injury  Description: Free from accidental physical injury  5/11/2020 0719 by Nancy Lees RN  Outcome: Ongoing     Problem: Daily Care:  Goal: Daily care needs are met  Description: Daily care needs are met  5/11/2020 0719 by Nancy Lees RN  Outcome: Ongoing     Problem: Discharge Planning:  Goal: Patients continuum of care needs are met  Description: Patients continuum of care needs are met  5/11/2020 0719 by Nancy Lees RN  Outcome: Ongoing     Problem: OXYGENATION/RESPIRATORY FUNCTION  Goal: Patient will maintain patent airway  Outcome: Ongoing     Problem: OXYGENATION/RESPIRATORY FUNCTION  Goal: Patient will achieve/maintain normal respiratory rate/effort  Description: Respiratory rate and effort will be within normal limits for the patient  Outcome: Ongoing     Problem: HEMODYNAMIC STATUS  Goal: Patient has stable vital signs and fluid balance  Outcome: Ongoing     Problem: FLUID AND ELECTROLYTE IMBALANCE  Goal: Fluid and electrolyte balance are achieved/maintained  Outcome: Ongoing     Problem: ACTIVITY INTOLERANCE/IMPAIRED MOBILITY  Goal: Mobility/activity is maintained at optimum level for patient  Outcome: Ongoing     Problem: Airway Clearance - Ineffective:  Goal: Ability to maintain a clear airway will improve  Description: Ability to maintain a clear airway will improve  Outcome: Ongoing     Problem: Breathing Pattern - Ineffective:  Goal: Ability to achieve and maintain a regular respiratory rate will improve  Description: Ability to achieve and maintain a regular respiratory rate will improve  Outcome: Ongoing     Problem: Gas Exchange - Impaired:  Goal: Levels of oxygenation will improve  Description: Levels of oxygenation will improve  Outcome: Ongoing

## 2020-05-12 VITALS
HEIGHT: 60 IN | HEART RATE: 72 BPM | DIASTOLIC BLOOD PRESSURE: 67 MMHG | OXYGEN SATURATION: 97 % | BODY MASS INDEX: 21.6 KG/M2 | RESPIRATION RATE: 14 BRPM | WEIGHT: 110 LBS | SYSTOLIC BLOOD PRESSURE: 111 MMHG | TEMPERATURE: 97.2 F

## 2020-05-12 LAB
CULTURE: ABNORMAL
EKG ATRIAL RATE: 116 BPM
EKG DIAGNOSIS: NORMAL
EKG P AXIS: 80 DEGREES
EKG P-R INTERVAL: 152 MS
EKG Q-T INTERVAL: 346 MS
EKG QRS DURATION: 86 MS
EKG QTC CALCULATION (BAZETT): 480 MS
EKG R AXIS: 73 DEGREES
EKG T AXIS: 32 DEGREES
EKG VENTRICULAR RATE: 116 BPM
GRAM SMEAR: ABNORMAL
Lab: ABNORMAL
SPECIMEN: ABNORMAL

## 2020-05-12 PROCEDURE — 94640 AIRWAY INHALATION TREATMENT: CPT

## 2020-05-12 PROCEDURE — 99232 SBSQ HOSP IP/OBS MODERATE 35: CPT | Performed by: INTERNAL MEDICINE

## 2020-05-12 PROCEDURE — 6370000000 HC RX 637 (ALT 250 FOR IP): Performed by: INTERNAL MEDICINE

## 2020-05-12 PROCEDURE — 6360000002 HC RX W HCPCS: Performed by: INTERNAL MEDICINE

## 2020-05-12 PROCEDURE — 2580000003 HC RX 258: Performed by: INTERNAL MEDICINE

## 2020-05-12 RX ORDER — PREDNISONE 20 MG/1
20 TABLET ORAL 2 TIMES DAILY
Qty: 10 TABLET | Refills: 0 | Status: CANCELLED | OUTPATIENT
Start: 2020-05-12 | End: 2020-05-17

## 2020-05-12 RX ORDER — PREDNISONE 10 MG/1
10 TABLET ORAL DAILY
Status: DISCONTINUED | OUTPATIENT
Start: 2020-05-18 | End: 2020-05-12 | Stop reason: HOSPADM

## 2020-05-12 RX ORDER — PREDNISONE 20 MG/1
40 TABLET ORAL DAILY
Status: DISCONTINUED | OUTPATIENT
Start: 2020-05-12 | End: 2020-05-12 | Stop reason: HOSPADM

## 2020-05-12 RX ORDER — PREDNISONE 10 MG/1
10 TABLET ORAL DAILY
Status: DISCONTINUED | OUTPATIENT
Start: 2020-05-12 | End: 2020-05-12 | Stop reason: SDUPTHER

## 2020-05-12 RX ORDER — DILTIAZEM HYDROCHLORIDE 180 MG/1
180 CAPSULE, COATED, EXTENDED RELEASE ORAL DAILY
Qty: 30 CAPSULE | Refills: 2 | Status: SHIPPED | OUTPATIENT
Start: 2020-05-12 | End: 2020-05-21

## 2020-05-12 RX ORDER — AMOXICILLIN AND CLAVULANATE POTASSIUM 875; 125 MG/1; MG/1
1 TABLET, FILM COATED ORAL 2 TIMES DAILY
Qty: 10 TABLET | Refills: 0 | Status: SHIPPED | OUTPATIENT
Start: 2020-05-12 | End: 2020-05-17

## 2020-05-12 RX ORDER — DILTIAZEM HYDROCHLORIDE 60 MG/1
60 TABLET, FILM COATED ORAL EVERY 8 HOURS SCHEDULED
Qty: 120 TABLET | Refills: 1 | Status: CANCELLED | OUTPATIENT
Start: 2020-05-12

## 2020-05-12 RX ORDER — PREDNISONE 20 MG/1
20 TABLET ORAL 2 TIMES DAILY
Qty: 14 TABLET | Refills: 0 | Status: SHIPPED | OUTPATIENT
Start: 2020-05-12 | End: 2020-05-19

## 2020-05-12 RX ORDER — PREDNISONE 20 MG/1
20 TABLET ORAL DAILY
Status: DISCONTINUED | OUTPATIENT
Start: 2020-05-16 | End: 2020-05-12 | Stop reason: HOSPADM

## 2020-05-12 RX ADMIN — MULTIPLE VITAMINS W/ MINERALS TAB 1 TABLET: TAB at 09:53

## 2020-05-12 RX ADMIN — Medication 2 PUFF: at 07:48

## 2020-05-12 RX ADMIN — Medication 2 PUFF: at 11:21

## 2020-05-12 RX ADMIN — ALBUTEROL SULFATE 2 PUFF: 90 AEROSOL, METERED RESPIRATORY (INHALATION) at 11:21

## 2020-05-12 RX ADMIN — PANTOPRAZOLE SODIUM 40 MG: 40 TABLET, DELAYED RELEASE ORAL at 05:35

## 2020-05-12 RX ADMIN — HEPARIN SODIUM 5000 UNITS: 5000 INJECTION, SOLUTION INTRAVENOUS; SUBCUTANEOUS at 05:36

## 2020-05-12 RX ADMIN — LOSARTAN POTASSIUM 100 MG: 100 TABLET, FILM COATED ORAL at 09:53

## 2020-05-12 RX ADMIN — ALBUTEROL SULFATE 2 PUFF: 90 AEROSOL, METERED RESPIRATORY (INHALATION) at 07:48

## 2020-05-12 RX ADMIN — CEFTRIAXONE SODIUM 1 G: 1 INJECTION, POWDER, FOR SOLUTION INTRAMUSCULAR; INTRAVENOUS at 05:35

## 2020-05-12 RX ADMIN — GUAIFENESIN 600 MG: 600 TABLET, EXTENDED RELEASE ORAL at 09:53

## 2020-05-12 RX ADMIN — AZITHROMYCIN MONOHYDRATE 500 MG: 500 INJECTION, POWDER, LYOPHILIZED, FOR SOLUTION INTRAVENOUS at 10:53

## 2020-05-12 RX ADMIN — SODIUM CHLORIDE, PRESERVATIVE FREE 10 ML: 5 INJECTION INTRAVENOUS at 09:53

## 2020-05-12 RX ADMIN — DILTIAZEM HYDROCHLORIDE 60 MG: 60 TABLET, FILM COATED ORAL at 05:35

## 2020-05-12 RX ADMIN — METHYLPREDNISOLONE SODIUM SUCCINATE 40 MG: 40 INJECTION, POWDER, FOR SOLUTION INTRAMUSCULAR; INTRAVENOUS at 09:53

## 2020-05-12 RX ADMIN — CLOPIDOGREL BISULFATE 75 MG: 75 TABLET ORAL at 09:53

## 2020-05-12 RX ADMIN — GABAPENTIN 100 MG: 100 CAPSULE ORAL at 09:52

## 2020-05-12 RX ADMIN — FUROSEMIDE 40 MG: 40 TABLET ORAL at 09:53

## 2020-05-12 RX ADMIN — POTASSIUM CHLORIDE 20 MEQ: 1500 TABLET, EXTENDED RELEASE ORAL at 09:53

## 2020-05-12 NOTE — PROGRESS NOTES
better      5. Severe MR-DARLYN 11/2018  6. Brain aneurysm-status post coil at [facility] in 2006  7. Carotid stenosis  8. Hypertension  9. Hx TIA  10. PVD  11. CKD 3  12. CAD      DVT Prophylaxis: Heparin  Diet: DIET CARDIAC;   Home O2: 2 L nasal cannula at night  Code Status: Full Code          Wilber Shore MD  5/12/2020    Meds:   Meds:    methylPREDNISolone  40 mg Intravenous Daily    furosemide  40 mg Oral BID    sodium chloride flush  10 mL Intravenous 2 times per day    heparin (porcine)  5,000 Units Subcutaneous 3 times per day    azithromycin  500 mg Intravenous Q24H    cefTRIAXone (ROCEPHIN) IV  1 g Intravenous Q24H    clopidogrel  75 mg Oral Daily    pantoprazole  40 mg Oral QAM AC    gabapentin  100 mg Oral BID    guaiFENesin  600 mg Oral BID    losartan  100 mg Oral Daily    therapeutic multivitamin-minerals  1 tablet Oral Daily    potassium chloride  20 mEq Oral Daily    pravastatin  10 mg Oral Nightly    albuterol sulfate HFA  2 puff Inhalation 4x daily    And    ipratropium  2 puff Inhalation 4x daily    dilTIAZem  60 mg Oral 3 times per day      Infusions:   PRN Meds: sodium chloride flush, 10 mL, PRN  acetaminophen, 650 mg, Q6H PRN  polyethylene glycol, 17 g, Daily PRN  promethazine, 12.5 mg, Q6H PRN    Or  ondansetron, 4 mg, Q6H PRN  acetaminophen, 650 mg, Q6H PRN        Data/Labs:     Recent Labs     05/09/20  0245 05/10/20  0500   WBC 15.6* 10.2   HGB 10.6* 10.2*   HCT 34.3* 31.7*   * 384      Recent Labs     05/09/20  0245 05/10/20  0500 05/11/20  1440    140 139   K 4.3 4.4 4.2   CL 99 98* 98*   CO2 20* 28 27   BUN 32* 38* 47*   CREATININE 1.9* 1.8* 1.9*     Recent Labs     05/09/20  0245 05/10/20  0500   AST 34 24   ALT 15 17   BILITOT 0.3 0.3   ALKPHOS 100 90     Recent Labs     05/09/20  1055 05/10/20  0500   INR 0.95 0.98     Recent Labs     05/09/20  0245   TROPONINT <0.010     HgBA1c: No results found for: LABA1C  CALCIUM:  8.4/27 (05/11 1440)    I/O last 3

## 2020-05-12 NOTE — PROGRESS NOTES
Overnight home BiPAP evaluation completed. The patient wore a two liter nasal cannula during the test, in accordance with her night time home oxygen regimen. It appears that Ms. Alan Pang does not qualify for home BiPAP. Her SpO2 was less than 88 percent for thirty two seconds. I faxed a copy of the results to [facility], and placed a copy in the patient's soft chart.

## 2020-05-12 NOTE — PROGRESS NOTES
Daily Progress Note    Patient is awake alert   Feeling better  No chest pain   Wants to go home  Significant valvular heart dx  She does not want surgery -think she would be high risk for surgery--conservative treatment   Keep on diuretics  Diastolic dysfunction -keep on cardizem    Echo --Summary   Left ventricular function is normal, EF is estimated at 55-60%. Moderate left ventricular hypertrophy. No regional wall motion abnormalities were detected. Mildly dilated left atrium. Sclerotic, but non-stenotic aortic valve. Severe Mitral annular calcification is present. Moderate mitral stenosis with a mean gradient of 7 mmHg. Moderate to Severe mitral regurgitation is present. Mild to moderate tricuspid regurgitation. RVSP is 4o mmHg. Mild pulmonary hypertension noted   No evidence of pericardial effusion. Pt. Awake, alert and feeling much better  HR stable, NSR, BP stable  No CP, SOB back to baseline per pt.     Acute on Chronic HFpEF    Hx of Mod-sev MR on DARLYN 11/18    BNP elevated- downtrending now    On Lasix- Good UOP, now on PO    Echo shows preserved EF, mod MS and Mod-sev. MR    Pt. Does not want surgery and not candidate at this time      COPDE    Covid neg. On ABx     On steroids    Pulm following     Increase activity-has done well so far  Ideally would like to see BNP decrease further-she wants to go home  Stable from cardiac standpoint-cont. Med. Tx.  And diuresis at this point  If D/C please have her f/u at office within one week     Echo --Summary--11/18   Left ventricular function is normal , EF is estimated at 50-%.   Moderately dilated left atrium.   Aortic valve is heavily calcified and appears be slightly stenotic.   Mitral valve is heavily calcified and appears to be stenotic.   Moderate to Severe mitral regurgitation is present.   Moderate tricuspid regurgitation.  Oval Cools is no evidence of thrombus in the Left Atrial Appendage.           The patient had a DARLYN done back in 11/2018. Marck Harmon DARLYN at that time had  shown that LV function was preserved.  Aortic valve was heavily  calcified with some aortic stenosis noted.  Mitral valve was heavily  calcified with moderate mitral stenosis and moderate-to-severe mitral  regurgitation noted.  Moderate tricuspid regurgitation was also present.     PAST MEDICAL HISTORY:  The patient has a significant history of valvular  heart disease present including aortic valve, mitral valve, and  tricuspid valve.  She is known to have coronary artery disease.  She had  an angioplasty done in the past.  She also has peripheral vascular  disease.  She had a cutting balloon angioplasty of the right common  femoral artery done and right SFA.  She also had aortofemoral bypass  surgery done in 1989 by Dr. Andrea Arias of COPD, renal  insufficiency, coronary artery disease, history of strokes, valvular  heart disease, GERD, and hypertension present.       Objective:   /67   Pulse 72   Temp 97.2 °F (36.2 °C) (Oral)   Resp 14   Ht 5' (1.524 m)   Wt 110 lb (49.9 kg)   SpO2 97%   BMI 21.48 kg/m²       Intake/Output Summary (Last 24 hours) at 5/12/2020 0947  Last data filed at 5/12/2020 0840  Gross per 24 hour   Intake 480 ml   Output 500 ml   Net -20 ml       Medications:   Scheduled Meds:   methylPREDNISolone  40 mg Intravenous Daily    furosemide  40 mg Oral BID    sodium chloride flush  10 mL Intravenous 2 times per day    heparin (porcine)  5,000 Units Subcutaneous 3 times per day    azithromycin  500 mg Intravenous Q24H    cefTRIAXone (ROCEPHIN) IV  1 g Intravenous Q24H    clopidogrel  75 mg Oral Daily    pantoprazole  40 mg Oral QAM AC    gabapentin  100 mg Oral BID    guaiFENesin  600 mg Oral BID    losartan  100 mg Oral Daily    therapeutic multivitamin-minerals  1 tablet Oral Daily    potassium chloride  20 mEq Oral Daily    pravastatin  10 mg Oral Nightly    albuterol sulfate HFA  2 puff Inhalation 4x daily    And  ipratropium  2 puff Inhalation 4x daily    dilTIAZem  60 mg Oral 3 times per day      Infusions:     PRN Meds:  sodium chloride flush, [DISCONTINUED] acetaminophen **OR** acetaminophen, polyethylene glycol, promethazine **OR** ondansetron, acetaminophen **OR** [DISCONTINUED] acetaminophen       Physical Exam:  Vitals:    05/12/20 0900   BP: 111/67   Pulse: 72   Resp: 14   Temp: 97.2 °F (36.2 °C)   SpO2:         General: AAO, NAD  Chest: Nontender  Cardiac: First and Second Heart Sounds are Normal, No Murmurs or Gallops noted  Lungs:Clear to auscultation and percussion. Abdomen: Soft, NT, ND, +BS  Extremities: No clubbing, no edema  Vascular:  Equal 2+ peripheral pulses. Lab Data:  CBC:   Recent Labs     05/10/20  0500   WBC 10.2   HGB 10.2*   HCT 31.7*   MCV 95.8        BMP:   Recent Labs     05/10/20  0500 05/11/20  1440    139   K 4.4 4.2   CL 98* 98*   CO2 28 27   BUN 38* 47*   CREATININE 1.8* 1.9*     LIVER PROFILE:   Recent Labs     05/10/20  0500   AST 24   ALT 17   BILITOT 0.3   ALKPHOS 90     PT/INR:   Recent Labs     05/09/20  1055 05/10/20  0500   PROTIME 11.5* 11.9   INR 0.95 0.98     APTT:   Recent Labs     05/09/20  1055 05/10/20  0500   APTT 32.4 39.3*     BNP:  No results for input(s): BNP in the last 72 hours.       Assessment:  Patient Active Problem List    Diagnosis Date Noted    [facility] on [facility]cap[facility] 05/09/2020    Pneumonia due to organism     Iron deficiency anemia 03/08/2019    Gastrointestinal hemorrhage associated with anorectal source 03/08/2019    CASS (acute kidney injury) ([facility] 03/06/2019    Chronic kidney disease (CKD) stage G3b/A3, moderately decreased glomerular filtration rate (GFR) between 30-44 mL/min/1.73 square meter and albuminuria creatinine ratio greater than 300 mg/g ([facility] 02/01/2019    Valvular disease 11/26/2018    Acute respiratory failure with hypoxia ([facility] [address] 11/08/2018    Flash pulmonary

## 2020-05-12 NOTE — CARE COORDINATION
CM reviewed chart and was able to briefly meet pt prior to d/c. Pt states she lives at home alone, uses a cane to assist w/ ambulation. She states she is fully independent including driving. Pt has insurance/PCP and can afford Rx. Pt asked questions r/t bipap study. CM informed pt per RCP note, that she does not qualify from bipap. Pt states she really would like one. CM advised pt to f/u w/ her pulmonologist.  She stated she does have an upcoming appts. Pt denies additional needs. CM available if needed.

## 2020-05-12 NOTE — PLAN OF CARE
Problem: Safety:  Goal: Free from accidental physical injury  Description: Free from accidental physical injury  5/12/2020 0936 by Yasmine Stevenson LPN  Outcome: Ongoing  5/12/2020 0140 by Thiago Bond LPN  Outcome: Ongoing     Problem: Daily Care:  Goal: Daily care needs are met  Description: Daily care needs are met  5/12/2020 0936 by Yasmine Stevenson LPN  Outcome: Ongoing  5/12/2020 0140 by Thiago Bond LPN  Outcome: Ongoing     Problem: Discharge Planning:  Goal: Patients continuum of care needs are met  Description: Patients continuum of care needs are met  5/12/2020 0936 by Yasmine Stevenson LPN  Outcome: Ongoing  5/12/2020 0140 by Thiago Bond LPN  Outcome: Ongoing     Problem: OXYGENATION/RESPIRATORY FUNCTION  Goal: Patient will maintain patent airway  5/12/2020 0936 by Yasmine Stevenson LPN  Outcome: Ongoing  5/12/2020 0140 by Thiago Bond LPN  Outcome: Ongoing  Goal: Patient will achieve/maintain normal respiratory rate/effort  Description: Respiratory rate and effort will be within normal limits for the patient  5/12/2020 0936 by Yasmine Stevenson LPN  Outcome: Ongoing  5/12/2020 0140 by Thiago Bond LPN  Outcome: Ongoing     Problem: HEMODYNAMIC STATUS  Goal: Patient has stable vital signs and fluid balance  5/12/2020 0936 by Yasmine Stevenson LPN  Outcome: Ongoing  5/12/2020 0140 by Thiago Bond LPN  Outcome: Ongoing     Problem: FLUID AND ELECTROLYTE IMBALANCE  Goal: Fluid and electrolyte balance are achieved/maintained  5/12/2020 0936 by Yasmine Stevenson LPN  Outcome: Ongoing  5/12/2020 0140 by Thiago Bond LPN  Outcome: Ongoing     Problem: ACTIVITY INTOLERANCE/IMPAIRED MOBILITY  Goal: Mobility/activity is maintained at optimum level for patient  5/12/2020 0936 by Yasmine Stevenson LPN  Outcome: Ongoing  5/12/2020 0140 by Thiago Bond LPN  Outcome: Ongoing     Problem: Airway Clearance - Ineffective:  Goal: Ability to maintain a clear airway will improve  Description: Ability to maintain

## 2020-05-12 NOTE — PROGRESS NOTES
ceftriaxone, azithromycin in the ER. Objective:   PHYSICAL EXAM:    LUNGS:Occasional basal crackles  Abd-soft, BS+,NT  Ext - no pedal edema  CVS-s1s2, no murmurs      DATA:    CBC:  Recent Labs     05/10/20  0500   WBC 10.2   RBC 3.31*   HGB 10.2*   HCT 31.7*      MCV 95.8   MCH 30.8   MCHC 32.2   RDW 14.2   SEGSPCT 87.1*      BMP:  Recent Labs     05/10/20  0500 05/11/20  1440    139   K 4.4 4.2   CL 98* 98*   CO2 28 27   BUN 38* 47*   CREATININE 1.8* 1.9*   CALCIUM 9.1 8.4   GLUCOSE 160* 136*      ABG:  No results for input(s): PH, PO2ART, XNU4GMN, HCO3, BEART, O2SAT in the last 72 hours. BNP  No results found for: BNP   D-Dimer:  Lab Results   Component Value Date    DDIMER 449 (H) 05/10/2020      1.  Radiology: None      Assessment/Plan     Patient Active Problem List    Diagnosis Date Noted    [facility] chronic respiratory [facility] with hypoxia and [facility]capnia [facility] 05/09/2020    Pneumonia due to organism     Iron deficiency anemia 03/08/2019    Gastrointestinal hemorrhage associated with anorectal source 03/08/2019    CASS [facility]acute kidney injury) [facility] 03/06/2019    Chronic kidney disease (CKD) stage G3b/A3, moderately decreased glomerular filtration rate (GFR) between 30-44 mL/min/1.73 square meter and albuminuria creatinine ratio greater than 300 mg/g ([facility] 02/01/2019    Valvular disease 11/26/2018     Overview Note:     Replacing deprecated diagnoses      Acute respiratory failure with hypoxia ([facility] 11/08/2018    Flash pulmonary edema ([facility] 11/05/2018    Chronic kidney disease (CKD) stage G1/A3, glomerular filtration rate (GFR) equal to or greater than 90 mL/min/1.73 square meter and albuminuria creatinine ratio greater than 300 mg/g 03/01/2018    Chronic kidney disease (CKD) stage G2/A3, mildly decreased glomerular filtration rate (GFR) between 60-89 mL/min/1.73 square meter and albuminuria creatinine ratio greater than 300 mg/g 04/17/2017    Proteinuria 02/23/2017    HTN (hypertension) 02/23/2017    OAB (overactive bladder) 02/23/2017    Hyperlipidemia 02/23/2017    PAD (peripheral artery disease) ([facility] 02/23/2017   Patient is sitting in the bed. She is not in acute resp distress    CASS on CKD  AG Metabolic alkalosis  Leukocytosis- improved  Pulmonary congestion- improving  Moderate to severe MR  ? AS  Moderate TR  Lactic acidosis- improved  COPD exac  CHF decompensated  COVID-19 - Negative       1. abx  2. F/u C&S  3. Diuresis  4. PO Prednisone taper  5. Nebs  6. Await placement  7. C/w present management  No follow-ups on file.     Electronically signed by Syed Faria MD on 5/12/2020 at 11:03 AM

## 2020-05-13 ENCOUNTER — CARE COORDINATION (OUTPATIENT)
Dept: CASE MANAGEMENT | Age: 85
End: 2020-05-13

## 2020-05-14 LAB
CULTURE: NORMAL
CULTURE: NORMAL
Lab: NORMAL
Lab: NORMAL
SPECIMEN: NORMAL
SPECIMEN: NORMAL

## 2020-05-18 ENCOUNTER — VIRTUAL VISIT (OUTPATIENT)
Dept: INTERNAL MEDICINE CLINIC | Age: 85
End: 2020-05-18
Payer: MEDICARE

## 2020-05-18 PROCEDURE — 99443 PR PHYS/QHP TELEPHONE EVALUATION 21-30 MIN: CPT | Performed by: FAMILY MEDICINE

## 2020-05-18 RX ORDER — GABAPENTIN 100 MG/1
100 CAPSULE ORAL 2 TIMES DAILY
Qty: 60 CAPSULE | Refills: 0 | Status: SHIPPED | OUTPATIENT
Start: 2020-05-18 | End: 2020-06-30 | Stop reason: SDUPTHER

## 2020-05-18 RX ORDER — BENZONATATE 100 MG/1
100 CAPSULE ORAL 3 TIMES DAILY PRN
Qty: 20 CAPSULE | Refills: 0 | Status: SHIPPED | OUTPATIENT
Start: 2020-05-18 | End: 2020-08-12

## 2020-05-18 NOTE — PROGRESS NOTES
Chronic diarrhea     Chronic kidney disease     \"follow with Dr Bacilio De La Garza" \"creatine level goes up and down\"    COPD (chronic obstructive pulmonary disease) ([facility]     follows with Dr Perez Pump pulmonary edema ([facility] 11/2018    \"have hx of this so they have to watch the IV fluid\"    Full dentures     Gastroesophageal reflux disease without esophagitis     Hiatal hernia     History of blood transfusion     3/2019    Hyperlipidemia     Hypertension     Irritable bowel disease     \"better now\"    Left carotid bruit 11/21/2014    Localized edema     Low back pain     \"for yrs- wear a back brace when I do anything for support\"    Nicotine dependence, other tobacco product, uncomplicated     On home oxygen therapy     2l/nc just at night     Osteoporosis     PONV (postoperative nausea and vomiting)     hx of motion sickness    Proteinuria, unspecified     PVD (peripheral vascular disease) ([facility]     Severe mitral regurgitation 11/2018    Solitary cyst of left breast     Subclavian steal syndrome 11/21/2014    Right side    TIA (transient ischemic attack)     Unspecified asthma, uncomplicated     ( pt stated does not have asthma- with phone assess 4/16/2019)    Unspecified osteoarthritis, unspecified site     Unspecified urinary incontinence     VHD (valvular heart disease)     Wears glasses     Wears glasses        Past Surgical History:   Procedure Laterality Date    [place]-F[facility]AL [place] [facility]    BRAIN ANEURYSM SURGERY  2005    coil-Left parasellar- [facility]    BRAIN SURGERY      CARDIAC CATHETERIZATION      CHOLECYSTECTOMY      COLONOSCOPY N/A 3/10/2019    COLONOSCOPY DIAGNOSTIC WITH IDENTIFICATION OF POLYPS performed by Desean Morales MD at [place] [address] COLONOSCOPY N/A 4/17/2019    COLONOSCOPY DIAGNOSTIC PEDI performed by Jenelle Larry MD at [address] [place] [address]  11/05/2018    UPPER GASTROINTESTINAL ENDOSCOPY N/A 3/9/2019 mouth daily      clopidogrel (PLAVIX) 75 MG tablet Take 75 mg by mouth daily.  Cilostazol (PLETAL PO) Take 100 mg by mouth daily        No current facility-administered medications for this visit. OBJECTIVE:    There were no vitals taken for this visit. Physical Exam  Constitutional:       General: She is not in acute distress. Neurological:      Mental Status: She is oriented to person, place, and time. Psychiatric:         Thought Content: Thought content normal.         ASSESSMENT:  1. Chronic obstructive pulmonary disease, unspecified COPD type ([facility]    2. Peripheral polyneuropathy    3. Essential hypertension    4. Female climacteric state    5. Screening mammogram, encounter for        PLAN:    Orders Placed This Encounter   Procedures    DEXA Bone Density Axial Skeleton    KAREN DIGITAL SCREEN W CAD BILATERAL       Orders Placed This Encounter   Medications    benzonatate (TESSALON PERLES) 100 MG capsule     Sig: Take 1 capsule by mouth 3 times daily as needed for Cough     Dispense:  20 capsule     Refill:  0    gabapentin (NEURONTIN) 100 MG capsule     Sig: Take 1 capsule by mouth 2 times daily for 30 days. Dispense:  60 capsule     Refill:  0   Per orders  Refill on medications  ADR's explained  Keep f/u with specialists    He/She and or healthcare decision maker is aware that a bill may be received for this telephone service, depending on insurance coverage. Verbal consent has been given to proceed: Yes    I affirm this is a Patient Initiated Episode with a patient who has not had a related appointment within my department in the past 7 days or scheduled within the next 24 hours    Patient identification was verified at the start of the visit: Yes    Total time: 25 minutes    Return in about 3 months (around 8/18/2020) for Check up.     Electronically Signed by Tea Melo DO

## 2020-05-20 ENCOUNTER — CARE COORDINATION (OUTPATIENT)
Dept: CASE MANAGEMENT | Age: 85
End: 2020-05-20

## 2020-05-22 ENCOUNTER — HOSPITAL ENCOUNTER (OUTPATIENT)
Age: 85
Discharge: HOME | End: 2020-05-22
Payer: MEDICARE

## 2020-05-22 LAB
ALBUMIN SERPL-MCNC: 3.8 GM/DL (ref 3.4–5)
ALP BLD-CCNC: 62 IU/L (ref 40–128)
ALT SERPL-CCNC: 75 U/L (ref 10–40)
ANION GAP SERPL CALCULATED.3IONS-SCNC: 15 MMOL/L (ref 4–16)
AST SERPL-CCNC: 23 IU/L (ref 15–37)
BACTERIA: NEGATIVE /HPF
BILIRUB SERPL-MCNC: 0.3 MG/DL (ref 0–1)
BILIRUBIN URINE: NEGATIVE MG/DL
BLOOD, URINE: NEGATIVE
BUN BLDV-MCNC: 91 MG/DL (ref 6–23)
CALCIUM SERPL-MCNC: 9.7 MG/DL (ref 8.3–10.6)
CHLORIDE BLD-SCNC: 94 MMOL/L (ref 99–110)
CLARITY: CLEAR
CO2: 26 MMOL/L (ref 21–32)
COLOR: ABNORMAL
CREAT SERPL-MCNC: 2.8 MG/DL (ref 0.6–1.1)
CREATININE URINE: 15.5 MG/DL (ref 28–217)
GFR AFRICAN AMERICAN: 20 ML/MIN/1.73M2
GFR NON-AFRICAN AMERICAN: 16 ML/MIN/1.73M2
GLUCOSE BLD-MCNC: 98 MG/DL (ref 70–99)
GLUCOSE, URINE: NEGATIVE MG/DL
KETONES, URINE: NEGATIVE MG/DL
LEUKOCYTE ESTERASE, URINE: ABNORMAL
MAGNESIUM: 2.3 MG/DL (ref 1.8–2.4)
NITRITE URINE, QUANTITATIVE: NEGATIVE
PH, URINE: 6 (ref 5–8)
PHOSPHORUS: 3.7 MG/DL (ref 2.5–4.9)
POTASSIUM SERPL-SCNC: 4.8 MMOL/L (ref 3.5–5.1)
PROT/CREAT RATIO, UR: 0.7
PROTEIN UA: NEGATIVE MG/DL
RBC URINE: <1 /HPF (ref 0–6)
SODIUM BLD-SCNC: 135 MMOL/L (ref 135–145)
SPECIFIC GRAVITY UA: 1 (ref 1–1.03)
SQUAMOUS EPITHELIAL: 1 /HPF
TOTAL PROTEIN: 5.7 GM/DL (ref 6.4–8.2)
TRICHOMONAS: ABNORMAL /HPF
URINE TOTAL PROTEIN: 11.1 MG/DL
UROBILINOGEN, URINE: NORMAL MG/DL (ref 0.2–1)
WBC UA: 1 /HPF (ref 0–5)

## 2020-05-22 PROCEDURE — 80053 COMPREHEN METABOLIC PANEL: CPT

## 2020-05-22 PROCEDURE — 84156 ASSAY OF PROTEIN URINE: CPT

## 2020-05-22 PROCEDURE — 83735 ASSAY OF MAGNESIUM: CPT

## 2020-05-22 PROCEDURE — 84100 ASSAY OF PHOSPHORUS: CPT

## 2020-05-22 PROCEDURE — 36415 COLL VENOUS BLD VENIPUNCTURE: CPT

## 2020-05-22 PROCEDURE — 82570 ASSAY OF URINE CREATININE: CPT

## 2020-05-22 PROCEDURE — 81001 URINALYSIS AUTO W/SCOPE: CPT

## 2020-05-25 ASSESSMENT — ENCOUNTER SYMPTOMS
ABDOMINAL PAIN: 0
COUGH: 1

## 2020-05-26 ENCOUNTER — CARE COORDINATION (OUTPATIENT)
Dept: CASE MANAGEMENT | Age: 85
End: 2020-05-26

## 2020-05-26 NOTE — CARE COORDINATION
[facility] Transitions Follow Up Call    2020    Patient: Porter Padilla  Patient : 1935   MRN: [id number]  Reason for Admission:  CHF, COPD, Ch Resp Failure on O2  Discharge Date: 20 RARS: Readmission Risk Score: 19    Care Transitions Subsequent and Final Call    Schedule Follow Up Appointment with PCP:  Declined  Subsequent and Final Calls  Do you have any ongoing symptoms?:  Yes  Onset of Patient-reported symptoms: In the past 7 days  Patient-reported symptoms:  Other  Interventions for patient-reported symptoms:  Other  Have your medications changed?:  No  Do you have any questions related to your medications?:  No  Do you currently have any active services?:  No  Do you have any needs or concerns that I can assist you with?:  No  Identified Barriers: Other  Care Transitions Interventions  No Identified Needs  Other Interventions:          COVID-19 RISK MONITORING  COVID-19 SCREEN:  Negative  PATIENT RISK FACTORS: CHF, COPD, Ch Resp Failure on O2    Patient contacted regarding COVID-19 risk and screening. Care Transition Nurse spoke w/ Patient by telephone to perform follow-up assessment. Verified name and  with as identifiers. Symptoms reviewed with Patient who denies any Covid19 related symptoms. Patient does report increased lorenzo le edema feet/toes. Denies seeping, erythema. Denies sob, orthopnea, chest pain. States \"my breathing has been doing pretty good\". Weight today 115#  Which she reports is 4# weight gain. Reports she contacted Dr Hill Garcia office this am and is awaiting call back. Denies need for CTN assistance. Stressed importance of notifying MD of edema and weight gain. Encouraged to elevate lorenzo le above level of heart when at rest. CHF Zone Mgt reviewed. Patient reports she is adhering to low sodium diet and fluid restriction as ordered however does mention eating soup.  CTN discussed hidden sodium in processed food such as soup, lunch meats, cheese,

## 2020-05-28 ENCOUNTER — HOSPITAL ENCOUNTER (INPATIENT)
Age: 85
LOS: 4 days | Discharge: HOME HEALTH | DRG: 291 | End: 2020-06-01
Attending: EMERGENCY MEDICINE | Admitting: INTERNAL MEDICINE
Payer: MEDICARE

## 2020-05-28 ENCOUNTER — APPOINTMENT (OUTPATIENT)
Dept: GENERAL RADIOLOGY | Age: 85
DRG: 291 | End: 2020-05-28
Payer: MEDICARE

## 2020-05-28 PROBLEM — I50.9 HEART FAILURE (HCC): Status: ACTIVE | Noted: 2020-05-28

## 2020-05-28 LAB
ALBUMIN SERPL-MCNC: 2.9 GM/DL (ref 3.4–5)
ALP BLD-CCNC: 114 IU/L (ref 40–129)
ALT SERPL-CCNC: 97 U/L (ref 10–40)
ANION GAP SERPL CALCULATED.3IONS-SCNC: 17 MMOL/L (ref 4–16)
AST SERPL-CCNC: 30 IU/L (ref 15–37)
BASOPHILS ABSOLUTE: 0.1 K/CU MM
BASOPHILS RELATIVE PERCENT: 0.6 % (ref 0–1)
BILIRUB SERPL-MCNC: 0.4 MG/DL (ref 0–1)
BUN BLDV-MCNC: 75 MG/DL (ref 6–23)
CALCIUM SERPL-MCNC: 7.8 MG/DL (ref 8.3–10.6)
CHLORIDE BLD-SCNC: 100 MMOL/L (ref 99–110)
CO2: 21 MMOL/L (ref 21–32)
CREAT SERPL-MCNC: 2.5 MG/DL (ref 0.6–1.1)
DIFFERENTIAL TYPE: ABNORMAL
EOSINOPHILS ABSOLUTE: 0.3 K/CU MM
EOSINOPHILS RELATIVE PERCENT: 2.7 % (ref 0–3)
GFR AFRICAN AMERICAN: 22 ML/MIN/1.73M2
GFR NON-AFRICAN AMERICAN: 18 ML/MIN/1.73M2
GLUCOSE BLD-MCNC: 198 MG/DL (ref 70–99)
HCT VFR BLD CALC: 27.9 % (ref 37–47)
HEMOGLOBIN: 8.7 GM/DL (ref 12.5–16)
IMMATURE NEUTROPHIL %: 0.5 % (ref 0–0.43)
LACTATE: 2.4 MMOL/L (ref 0.4–2)
LYMPHOCYTES ABSOLUTE: 0.8 K/CU MM
LYMPHOCYTES RELATIVE PERCENT: 7.4 % (ref 24–44)
MCH RBC QN AUTO: 31.5 PG (ref 27–31)
MCHC RBC AUTO-ENTMCNC: 31.2 % (ref 32–36)
MCV RBC AUTO: 101.1 FL (ref 78–100)
MONOCYTES ABSOLUTE: 0.7 K/CU MM
MONOCYTES RELATIVE PERCENT: 6.7 % (ref 0–4)
NUCLEATED RBC %: 0 %
PDW BLD-RTO: 14.8 % (ref 11.7–14.9)
PLATELET # BLD: 260 K/CU MM (ref 140–440)
PMV BLD AUTO: 10 FL (ref 7.5–11.1)
POTASSIUM SERPL-SCNC: 3.9 MMOL/L (ref 3.5–5.1)
PRO-BNP: ABNORMAL PG/ML
RBC # BLD: 2.76 M/CU MM (ref 4.2–5.4)
SEGMENTED NEUTROPHILS ABSOLUTE COUNT: 8.9 K/CU MM
SEGMENTED NEUTROPHILS RELATIVE PERCENT: 82.1 % (ref 36–66)
SODIUM BLD-SCNC: 138 MMOL/L (ref 135–145)
TOTAL IMMATURE NEUTOROPHIL: 0.05 K/CU MM
TOTAL NUCLEATED RBC: 0 K/CU MM
TOTAL PROTEIN: 5.6 GM/DL (ref 6.4–8.2)
TROPONIN T: 0.16 NG/ML
WBC # BLD: 10.8 K/CU MM (ref 4–10.5)

## 2020-05-28 PROCEDURE — 93005 ELECTROCARDIOGRAM TRACING: CPT | Performed by: PHYSICIAN ASSISTANT

## 2020-05-28 PROCEDURE — 99291 CRITICAL CARE FIRST HOUR: CPT

## 2020-05-28 PROCEDURE — 83880 ASSAY OF NATRIURETIC PEPTIDE: CPT

## 2020-05-28 PROCEDURE — 2060000000 HC ICU INTERMEDIATE R&B

## 2020-05-28 PROCEDURE — 96374 THER/PROPH/DIAG INJ IV PUSH: CPT

## 2020-05-28 PROCEDURE — 6360000002 HC RX W HCPCS: Performed by: EMERGENCY MEDICINE

## 2020-05-28 PROCEDURE — 84484 ASSAY OF TROPONIN QUANT: CPT

## 2020-05-28 PROCEDURE — 85025 COMPLETE CBC W/AUTO DIFF WBC: CPT

## 2020-05-28 PROCEDURE — 83605 ASSAY OF LACTIC ACID: CPT

## 2020-05-28 PROCEDURE — 71045 X-RAY EXAM CHEST 1 VIEW: CPT

## 2020-05-28 PROCEDURE — 93005 ELECTROCARDIOGRAM TRACING: CPT | Performed by: EMERGENCY MEDICINE

## 2020-05-28 PROCEDURE — 80053 COMPREHEN METABOLIC PANEL: CPT

## 2020-05-28 PROCEDURE — 6370000000 HC RX 637 (ALT 250 FOR IP): Performed by: PHYSICIAN ASSISTANT

## 2020-05-28 RX ORDER — ACETAMINOPHEN 325 MG/1
650 TABLET ORAL EVERY 6 HOURS PRN
Status: CANCELLED | OUTPATIENT
Start: 2020-05-28

## 2020-05-28 RX ORDER — FUROSEMIDE 10 MG/ML
40 INJECTION INTRAMUSCULAR; INTRAVENOUS ONCE
Status: COMPLETED | OUTPATIENT
Start: 2020-05-28 | End: 2020-05-28

## 2020-05-28 RX ORDER — ACETAMINOPHEN 650 MG/1
650 SUPPOSITORY RECTAL EVERY 6 HOURS PRN
Status: CANCELLED | OUTPATIENT
Start: 2020-05-28

## 2020-05-28 RX ORDER — ASPIRIN 81 MG/1
324 TABLET, CHEWABLE ORAL ONCE
Status: COMPLETED | OUTPATIENT
Start: 2020-05-28 | End: 2020-05-28

## 2020-05-28 RX ORDER — LOSARTAN POTASSIUM 50 MG/1
50 TABLET ORAL DAILY
Status: CANCELLED | OUTPATIENT
Start: 2020-05-29

## 2020-05-28 RX ORDER — IPRATROPIUM BROMIDE AND ALBUTEROL SULFATE 2.5; .5 MG/3ML; MG/3ML
1 SOLUTION RESPIRATORY (INHALATION) ONCE
Status: COMPLETED | OUTPATIENT
Start: 2020-05-28 | End: 2020-05-29

## 2020-05-28 RX ADMIN — ASPIRIN 81 MG 324 MG: 81 TABLET ORAL at 21:13

## 2020-05-28 RX ADMIN — FUROSEMIDE 40 MG: 10 INJECTION, SOLUTION INTRAMUSCULAR; INTRAVENOUS at 23:30

## 2020-05-29 LAB
ANION GAP SERPL CALCULATED.3IONS-SCNC: 14 MMOL/L (ref 4–16)
APTT: 41.5 SECONDS (ref 25.1–37.1)
BACTERIA: NEGATIVE /HPF
BILIRUBIN URINE: NEGATIVE MG/DL
BLOOD, URINE: NEGATIVE
BUN BLDV-MCNC: 77 MG/DL (ref 6–23)
CALCIUM SERPL-MCNC: 8.4 MG/DL (ref 8.3–10.6)
CHLORIDE BLD-SCNC: 99 MMOL/L (ref 99–110)
CLARITY: CLEAR
CO2: 27 MMOL/L (ref 21–32)
COLOR: YELLOW
CREAT SERPL-MCNC: 2.7 MG/DL (ref 0.6–1.1)
CREATININE URINE: 95.8 MG/DL (ref 28–217)
D DIMER: 468 NG/ML(DDU)
EKG ATRIAL RATE: 108 BPM
EKG ATRIAL RATE: 130 BPM
EKG DIAGNOSIS: NORMAL
EKG DIAGNOSIS: NORMAL
EKG P AXIS: 55 DEGREES
EKG P AXIS: 81 DEGREES
EKG P-R INTERVAL: 140 MS
EKG P-R INTERVAL: 176 MS
EKG Q-T INTERVAL: 310 MS
EKG Q-T INTERVAL: 340 MS
EKG QRS DURATION: 80 MS
EKG QRS DURATION: 84 MS
EKG QTC CALCULATION (BAZETT): 455 MS
EKG QTC CALCULATION (BAZETT): 456 MS
EKG R AXIS: 67 DEGREES
EKG R AXIS: 78 DEGREES
EKG T AXIS: 57 DEGREES
EKG T AXIS: 67 DEGREES
EKG VENTRICULAR RATE: 108 BPM
EKG VENTRICULAR RATE: 130 BPM
FERRITIN: 141 NG/ML (ref 15–150)
FIBRINOGEN LEVEL: 640 MG/DL (ref 196.9–442.1)
GFR AFRICAN AMERICAN: 20 ML/MIN/1.73M2
GFR NON-AFRICAN AMERICAN: 17 ML/MIN/1.73M2
GLUCOSE BLD-MCNC: 119 MG/DL (ref 70–99)
GLUCOSE, URINE: NEGATIVE MG/DL
HIGH SENSITIVE C-REACTIVE PROTEIN: 215.9 MG/L
HYALINE CASTS: 0 /LPF
INR BLD: 1.2 INDEX
KETONES, URINE: NEGATIVE MG/DL
LACTATE DEHYDROGENASE: 249 IU/L (ref 120–246)
LACTATE: 1.2 MMOL/L (ref 0.4–2)
LEUKOCYTE ESTERASE, URINE: NEGATIVE
MAGNESIUM: 2.2 MG/DL (ref 1.8–2.4)
MUCUS: ABNORMAL HPF
NITRITE URINE, QUANTITATIVE: NEGATIVE
PH, URINE: 5 (ref 5–8)
POTASSIUM SERPL-SCNC: 4.3 MMOL/L (ref 3.5–5.1)
PROCALCITONIN: 0.21
PROT/CREAT RATIO, UR: 0.9
PROTEIN UA: 100 MG/DL
PROTHROMBIN TIME: 14.5 SECONDS (ref 11.7–14.5)
RBC URINE: ABNORMAL /HPF (ref 0–6)
REASON FOR REJECTION: NORMAL
REJECTED TEST: NORMAL
SODIUM BLD-SCNC: 140 MMOL/L (ref 135–145)
SODIUM URINE: 12 MMOL/L (ref 35–167)
SPECIFIC GRAVITY UA: 1.01 (ref 1–1.03)
SQUAMOUS EPITHELIAL: <1 /HPF
TRICHOMONAS: ABNORMAL /HPF
TROPONIN T: 0.21 NG/ML
URINE TOTAL PROTEIN: 88.9 MG/DL
UROBILINOGEN, URINE: NORMAL MG/DL (ref 0.2–1)
WBC UA: <1 /HPF (ref 0–5)

## 2020-05-29 PROCEDURE — 84300 ASSAY OF URINE SODIUM: CPT

## 2020-05-29 PROCEDURE — 81001 URINALYSIS AUTO W/SCOPE: CPT

## 2020-05-29 PROCEDURE — 6360000002 HC RX W HCPCS: Performed by: INTERNAL MEDICINE

## 2020-05-29 PROCEDURE — 36600 WITHDRAWAL OF ARTERIAL BLOOD: CPT

## 2020-05-29 PROCEDURE — 84145 PROCALCITONIN (PCT): CPT

## 2020-05-29 PROCEDURE — 85610 PROTHROMBIN TIME: CPT

## 2020-05-29 PROCEDURE — 82728 ASSAY OF FERRITIN: CPT

## 2020-05-29 PROCEDURE — 2700000000 HC OXYGEN THERAPY PER DAY

## 2020-05-29 PROCEDURE — 6370000000 HC RX 637 (ALT 250 FOR IP): Performed by: EMERGENCY MEDICINE

## 2020-05-29 PROCEDURE — 6370000000 HC RX 637 (ALT 250 FOR IP): Performed by: INTERNAL MEDICINE

## 2020-05-29 PROCEDURE — 94640 AIRWAY INHALATION TREATMENT: CPT

## 2020-05-29 PROCEDURE — 80048 BASIC METABOLIC PNL TOTAL CA: CPT

## 2020-05-29 PROCEDURE — 83735 ASSAY OF MAGNESIUM: CPT

## 2020-05-29 PROCEDURE — U0002 COVID-19 LAB TEST NON-CDC: HCPCS

## 2020-05-29 PROCEDURE — 83615 LACTATE (LD) (LDH) ENZYME: CPT

## 2020-05-29 PROCEDURE — 82570 ASSAY OF URINE CREATININE: CPT

## 2020-05-29 PROCEDURE — 2060000000 HC ICU INTERMEDIATE R&B

## 2020-05-29 PROCEDURE — 85384 FIBRINOGEN ACTIVITY: CPT

## 2020-05-29 PROCEDURE — 93010 ELECTROCARDIOGRAM REPORT: CPT | Performed by: INTERNAL MEDICINE

## 2020-05-29 PROCEDURE — 86141 C-REACTIVE PROTEIN HS: CPT

## 2020-05-29 PROCEDURE — 85379 FIBRIN DEGRADATION QUANT: CPT

## 2020-05-29 PROCEDURE — 94761 N-INVAS EAR/PLS OXIMETRY MLT: CPT

## 2020-05-29 PROCEDURE — 85730 THROMBOPLASTIN TIME PARTIAL: CPT

## 2020-05-29 PROCEDURE — 84484 ASSAY OF TROPONIN QUANT: CPT

## 2020-05-29 PROCEDURE — 84156 ASSAY OF PROTEIN URINE: CPT

## 2020-05-29 PROCEDURE — 2580000003 HC RX 258: Performed by: INTERNAL MEDICINE

## 2020-05-29 RX ORDER — METOLAZONE 5 MG/1
2.5 TABLET ORAL DAILY
Status: COMPLETED | OUTPATIENT
Start: 2020-05-29 | End: 2020-05-29

## 2020-05-29 RX ORDER — ACETAMINOPHEN 325 MG/1
650 TABLET ORAL EVERY 6 HOURS PRN
Status: DISCONTINUED | OUTPATIENT
Start: 2020-05-29 | End: 2020-06-01 | Stop reason: HOSPADM

## 2020-05-29 RX ORDER — SODIUM CHLORIDE 0.9 % (FLUSH) 0.9 %
10 SYRINGE (ML) INJECTION PRN
Status: DISCONTINUED | OUTPATIENT
Start: 2020-05-29 | End: 2020-06-01 | Stop reason: HOSPADM

## 2020-05-29 RX ORDER — ACETAMINOPHEN 325 MG/1
650 TABLET ORAL EVERY 6 HOURS PRN
Status: DISCONTINUED | OUTPATIENT
Start: 2020-05-29 | End: 2020-05-29

## 2020-05-29 RX ORDER — CLOPIDOGREL BISULFATE 75 MG/1
75 TABLET ORAL DAILY
Status: DISCONTINUED | OUTPATIENT
Start: 2020-05-29 | End: 2020-06-01 | Stop reason: HOSPADM

## 2020-05-29 RX ORDER — PRAVASTATIN SODIUM 40 MG
40 TABLET ORAL DAILY
Status: DISCONTINUED | OUTPATIENT
Start: 2020-05-29 | End: 2020-06-01 | Stop reason: HOSPADM

## 2020-05-29 RX ORDER — ACETAMINOPHEN 650 MG/1
650 SUPPOSITORY RECTAL EVERY 6 HOURS PRN
Status: DISCONTINUED | OUTPATIENT
Start: 2020-05-29 | End: 2020-05-29

## 2020-05-29 RX ORDER — ALBUTEROL SULFATE 90 UG/1
2 AEROSOL, METERED RESPIRATORY (INHALATION) 4 TIMES DAILY
Status: DISCONTINUED | OUTPATIENT
Start: 2020-05-29 | End: 2020-06-01 | Stop reason: HOSPADM

## 2020-05-29 RX ORDER — ALBUTEROL SULFATE 2.5 MG/3ML
2.5 SOLUTION RESPIRATORY (INHALATION) EVERY 4 HOURS PRN
Status: DISCONTINUED | OUTPATIENT
Start: 2020-05-29 | End: 2020-05-29

## 2020-05-29 RX ORDER — PANTOPRAZOLE SODIUM 40 MG/1
40 TABLET, DELAYED RELEASE ORAL
Status: DISCONTINUED | OUTPATIENT
Start: 2020-05-29 | End: 2020-06-01 | Stop reason: HOSPADM

## 2020-05-29 RX ORDER — PROMETHAZINE HYDROCHLORIDE 25 MG/1
12.5 TABLET ORAL EVERY 6 HOURS PRN
Status: DISCONTINUED | OUTPATIENT
Start: 2020-05-29 | End: 2020-06-01 | Stop reason: HOSPADM

## 2020-05-29 RX ORDER — METOPROLOL TARTRATE 50 MG/1
50 TABLET, FILM COATED ORAL 2 TIMES DAILY
Status: DISCONTINUED | OUTPATIENT
Start: 2020-05-29 | End: 2020-05-31

## 2020-05-29 RX ORDER — POLYETHYLENE GLYCOL 3350 17 G/17G
17 POWDER, FOR SOLUTION ORAL DAILY PRN
Status: DISCONTINUED | OUTPATIENT
Start: 2020-05-29 | End: 2020-06-01 | Stop reason: HOSPADM

## 2020-05-29 RX ORDER — METOLAZONE 5 MG/1
5 TABLET ORAL DAILY
Status: DISCONTINUED | OUTPATIENT
Start: 2020-05-29 | End: 2020-05-30

## 2020-05-29 RX ORDER — GABAPENTIN 100 MG/1
100 CAPSULE ORAL 2 TIMES DAILY
Status: DISCONTINUED | OUTPATIENT
Start: 2020-05-29 | End: 2020-06-01 | Stop reason: HOSPADM

## 2020-05-29 RX ORDER — FUROSEMIDE 10 MG/ML
40 INJECTION INTRAMUSCULAR; INTRAVENOUS 3 TIMES DAILY
Status: DISCONTINUED | OUTPATIENT
Start: 2020-05-29 | End: 2020-05-30

## 2020-05-29 RX ORDER — ALBUTEROL SULFATE 90 UG/1
2 AEROSOL, METERED RESPIRATORY (INHALATION) EVERY 4 HOURS PRN
Status: DISCONTINUED | OUTPATIENT
Start: 2020-05-29 | End: 2020-06-01 | Stop reason: HOSPADM

## 2020-05-29 RX ORDER — ONDANSETRON 2 MG/ML
4 INJECTION INTRAMUSCULAR; INTRAVENOUS EVERY 6 HOURS PRN
Status: DISCONTINUED | OUTPATIENT
Start: 2020-05-29 | End: 2020-06-01 | Stop reason: HOSPADM

## 2020-05-29 RX ORDER — ACETAMINOPHEN 650 MG/1
650 SUPPOSITORY RECTAL EVERY 6 HOURS PRN
Status: DISCONTINUED | OUTPATIENT
Start: 2020-05-29 | End: 2020-06-01 | Stop reason: HOSPADM

## 2020-05-29 RX ORDER — SODIUM CHLORIDE 0.9 % (FLUSH) 0.9 %
10 SYRINGE (ML) INJECTION EVERY 12 HOURS SCHEDULED
Status: DISCONTINUED | OUTPATIENT
Start: 2020-05-29 | End: 2020-06-01 | Stop reason: HOSPADM

## 2020-05-29 RX ADMIN — CLOPIDOGREL BISULFATE 75 MG: 75 TABLET ORAL at 09:02

## 2020-05-29 RX ADMIN — FUROSEMIDE 40 MG: 10 INJECTION, SOLUTION INTRAMUSCULAR; INTRAVENOUS at 13:32

## 2020-05-29 RX ADMIN — ENOXAPARIN SODIUM 30 MG: 30 INJECTION SUBCUTANEOUS at 09:02

## 2020-05-29 RX ADMIN — METOLAZONE 2.5 MG: 5 TABLET ORAL at 04:53

## 2020-05-29 RX ADMIN — GABAPENTIN 100 MG: 100 CAPSULE ORAL at 09:02

## 2020-05-29 RX ADMIN — ALBUTEROL SULFATE 2 PUFF: 90 AEROSOL, METERED RESPIRATORY (INHALATION) at 20:20

## 2020-05-29 RX ADMIN — IPRATROPIUM BROMIDE 2 PUFF: 17 AEROSOL, METERED RESPIRATORY (INHALATION) at 20:22

## 2020-05-29 RX ADMIN — METOPROLOL TARTRATE 50 MG: 50 TABLET, FILM COATED ORAL at 21:56

## 2020-05-29 RX ADMIN — IPRATROPIUM BROMIDE AND ALBUTEROL SULFATE 1 AMPULE: .5; 3 SOLUTION RESPIRATORY (INHALATION) at 00:00

## 2020-05-29 RX ADMIN — FUROSEMIDE 40 MG: 10 INJECTION, SOLUTION INTRAMUSCULAR; INTRAVENOUS at 21:56

## 2020-05-29 RX ADMIN — METOLAZONE 5 MG: 5 TABLET ORAL at 09:02

## 2020-05-29 RX ADMIN — PANTOPRAZOLE SODIUM 40 MG: 40 TABLET, DELAYED RELEASE ORAL at 06:25

## 2020-05-29 RX ADMIN — GABAPENTIN 100 MG: 100 CAPSULE ORAL at 21:56

## 2020-05-29 RX ADMIN — FUROSEMIDE 40 MG: 10 INJECTION, SOLUTION INTRAMUSCULAR; INTRAVENOUS at 09:02

## 2020-05-29 RX ADMIN — SODIUM CHLORIDE, PRESERVATIVE FREE 10 ML: 5 INJECTION INTRAVENOUS at 09:02

## 2020-05-29 RX ADMIN — METOPROLOL TARTRATE 50 MG: 50 TABLET, FILM COATED ORAL at 09:01

## 2020-05-29 RX ADMIN — PRAVASTATIN SODIUM 40 MG: 40 TABLET ORAL at 09:02

## 2020-05-29 ASSESSMENT — PAIN SCALES - GENERAL
PAINLEVEL_OUTOF10: 0

## 2020-05-29 NOTE — ED PROVIDER NOTES
eMERGENCY dEPARTMENT eNCOUnter        [address] [facility]    Chief Complaint   Patient presents with    Fever    Shortness of Breath       HPI    Tushar Dennis is a 82 y.o. female who presents with shortness of breath. Onset was over the last few days. Dr. Lolly Montana had her double her Lasix x 4 days beginning yesterday. Family convinced her to come in tonight because she began running a fever today--up to 101.6 at home. Did not take any anti-pyretics prior to coming in. She reports a mild, non-productive cough. Denies chest pain. Denies n/v/d. Does have swelling of the bilateral LE. History of COPD and CHF. Uses oxygen at night only. She had a recent admission and had negative COVID testing at that time. Denies any sick contacts. REVIEW OF SYSTEMS    Constitutional:  + fever. HENT:  Denies headache, nasal congestion, sore throat. Neck:  Denies neck pain. Respiratory:  + shortness of breath, cough. Cardiovascular:  Denies chest pain. GI:  Denies abdominal pain, nausea, vomiting, diarrhea. :  Denies urinary symptoms. Musculoskeletal:  Denies extremity swelling. Integument:  Denies skin changes. Neurologic:  Denies any LOC. All other systems reviewed and are negative.     PAST MEDICAL & SURGICAL HISTORY    Past Medical History:   Diagnosis Date    Acute renal failure ([address] 4/17/2017    Acute respiratory failure ([address] 11/2018    Anemia     per old chart Hgb 7.5 on 3/8/2019( had transfusion)    Anemia, unspecified     Brain aneurysm     NO MRI--[facility]x of coiling at [facility] CAD (coronary artery disease)     Carotid stenosis, bilateral 11/2018    Severe    Cerebral artery occlusion with cerebral infarction [facility])     \"after cath, had slight stroke or TIA and affected my vision, had double vision lasted approx 24 hours and no trouble since then\"    CHF (congestive heart failure) ([address]     \"get pulmonary edema from a bad heart valve and CHF\" follow with Dr Jeff Thornton Chronic diarrhea     ONE XRAY VIEW OF THE CHEST 5/28/2020 8:01 pm COMPARISON: Chest x-ray May 11, 2020; CT chest May 10, 2020 HISTORY: ORDERING SYSTEM PROVIDED HISTORY: sob TECHNOLOGIST PROVIDED HISTORY: Reason for exam:->sob Reason for Exam: sob Acuity: Acute Type of Exam: Initial Additional signs and symptoms: na Relevant Medical/Surgical History: na FINDINGS: Cardiac silhouette is enlarged but stable. Atherosclerotic changes of the aorta. Persistent chronic appearing interstitial changes of the lungs with question of mild superimposed pulmonary edema. .  Small bilateral effusions. No pneumothorax. Osseous structures appear intact. 1. Similar findings when compared with exam from May 10, 2020 with persistent small bilateral pleural effusions and likely mild superimposed pulmonary edema. Xr Chest Portable    Result Date: 5/13/2020  EXAMINATION: ONE XRAY VIEW OF THE CHEST 5/11/2020 4:00 am COMPARISON: 05/10/2020 HISTORY: ORDERING SYSTEM PROVIDED HISTORY: recheck bilateral pleaural effusions TECHNOLOGIST PROVIDED HISTORY: Reason for exam:->recheck bilateral pleaural effusions Reason for Exam: recheck bilateral pleaural effusions Acuity: Unknown Type of Exam: Subsequent/Follow-up Additional signs and symptoms: recheck bilateral pleaural effusions Relevant Medical/Surgical History: recheck bilateral pleaural effusions FINDINGS: Persistent interstitial pattern is nonspecific. No florid pulmonary edema. Stable cardiomediastinal silhouette. There are minimal effusions. Stable exam.     Xr Chest Portable    Result Date: 5/10/2020  EXAMINATION: ONE X-RAY VIEW OF THE CHEST 5/10/2020 11:18 am COMPARISON: 05/09/2020 HISTORY: ORDERING SYSTEM PROVIDED HISTORY: Hypoxia and congestion TECHNOLOGIST PROVIDED HISTORY: Reason for exam:->Hypoxia and congestion Reason for Exam: Hypoxia and congestion Acuity: Acute Type of Exam: Initial FINDINGS: The heart and mediastinal structures are stable.   Increased interstitial markings are present questioning interstitial edema. Some bibasilar airspace disease persists left greater than right. The aeration of the lungs has improved from 1 day earlier. Bones are osteopenic. Improving aeration of the lungs suggesting decreasing pulmonary edema. Xr Chest Portable    Result Date: 5/9/2020  EXAMINATION: ONE X-RAY VIEW OF THE CHEST 5/9/2020 2:45 am COMPARISON: 02/12/2020 HISTORY: ORDERING SYSTEM PROVIDED HISTORY: SOB TECHNOLOGIST PROVIDED HISTORY: Reason for exam:->SOB Reason for Exam: SOB Acuity: Acute Type of Exam: Initial FINDINGS: Bilateral symmetric interstitial and airspace opacities. No pneumothorax. Stable heart size and mediastinal contours. Bilateral interstitial and airspace disease. Differential includes edema versus multifocal infection. Vl Dup Lower Extremity Venous Left    Result Date: 5/9/2020  EXAMINATION: DUPLEX VENOUS ULTRASOUND OF THE LEFT LOWER EXTREMITY 5/9/2020 3:46 am TECHNIQUE: Duplex ultrasound and Doppler images were obtained of the left lower extremity. COMPARISON: None. HISTORY: ORDERING SYSTEM PROVIDED HISTORY: edema x 2 wks TECHNOLOGIST PROVIDED HISTORY: Reason for exam:->edema x 2 wks Reason for Exam: leg pain Acuity: Unknown Type of Exam: Unknown Initial exam FINDINGS: The visualized veins of the left lower extremity are patent and free of echogenic thrombus. The veins are normally compressible and have normal phasic flow. No evidence of DVT in the left lower extremity. ED COURSE & MEDICAL DECISION MAKING    Pertinent Labs & Imaging studies reviewed and interpreted. (See chart for details)  -  Patient seen and evaluated in the emergency department. -  Triage and nursing notes reviewed and incorporated. -  Old chart records reviewed and incorporated. -  Patient case discussed with attending physician, Dr. Alena Monaco. They saw and examined patient.   -  Differential diagnosis includes: ACS, COPD exacerbation, CHF, MI, PE, pneumonia, pneumothorax, and others  -

## 2020-05-29 NOTE — PROGRESS NOTES
RENAL DOSE ADJUSTMENT MADE PER P/T PROTOCOL    PREVIOUS ORDER:  Enoxaparin 40 mg daily    Estimated Creatinine Clearance: 12 mL/min (A) (based on SCr of 2.5 mg/dL (H)).   Recent Labs     05/28/20 2003   BUN 75*   CREATININE 2.5*        NEW RENALLY ADJUSTED ORDER:  Enoxaparin 30 mg daily    Lolly Jordan [address] [facility]  5/29/2020 4:32 AM  __________________________________________________

## 2020-05-29 NOTE — PROGRESS NOTES
Patient arrived to floor on cart at [address]. Patient transfer to bed one assist. Patient alert and oriented. Patient denies pain . Patient belongings shirt, under garments and pants. Patient educated on call light and room. Skin looked at with [facility]. No open areas. Abrasion to left park.      Tanvir Rivera RN

## 2020-05-29 NOTE — CONSULTS
RD consulted for CHF education. Pt is on the COVID unit, therefore, unable to complete education at this time. Will continue to follow per protocol and educate as prognosis and schedule allows.     Electronically signed by Bryan Benitez RD, LD on 1/52/0978 at 10:08 AM  [id number]

## 2020-05-29 NOTE — ED PROVIDER NOTES
eMERGENCY dEPARTMENT eNCOUnter    Attending note    I cared for and evaluated the patient in conjunction with the ED Advanced Practice Provider    CRITICAL CARE NOTE:  There was a high probability of clinically significant life-threatening deterioration of the patient's condition requiring my urgent intervention. Total critical care time is 30 minutes  This includes vital sign monitoring, pulse oximetry monitoring, telemetry monitoring, clinical response to the IV medications, reviewing the nursing notes, consultation time, dictation/documetation time. (This time excludes time spent performing procedures). HPI/Physical Exam/Medical Decision Making  Garett Manzano is a 82 y.o. female here for evaluation of shortness of breath. The patient states that the symptoms started 3-4 days ago. Dr. Sadler Nip had her increase her Lasix, but this did not seem to help. The patient states that she had a fever as high as 101.6 today. She has a history of COPD and CHF. She uses oxygen at night only. The patient recently tested negative for COVID. Please see LUIS F note for further details. Vitals:   ED Triage Vitals   Enc Vitals Group      BP 05/28/20 1948 (!) 108/47      Pulse 05/28/20 1948 102      Resp 05/28/20 1948 14      Temp 05/1935 99.9 °F (37.7 °C)      Temp Source 05/1935 Oral      SpO2 05/28/20 1948 92 %      Weight 05/1935 110 lb (49.9 kg)      Height 05/1935 5' (1.524 m)      Head Circumference --       Peak Flow --       Pain Score --       Pain Loc --       Pain Edu? --       Excl.  in [address]? --      EKG Interpretation # 1 (20:17)  Interpreted by me  Compared to 5/9/20  Rhythm: sinus tachycardia   Rate: tachycardic 108  Axis: normal  Ectopy: none  Conduction: normal  ST Segments: ST depression in leads V4-V6, appears similar to previous  T Waves: normal  Clinical Impression: sinus tachycardia, ST depression in leads V4-V6, appears similar to previous    Cardiac Monitor Strip Interpretation  Interpreted by me  Monitor strip interpreted for greater than 10 seconds  Rhythm: sinus tachycardia  Rate: taachycardic  Ectopy: none  ST Segments: normal    EKG Interpretation # 2 (23:28)  Interpreted by me  Compared to previous EKG from today  Rhythm: sinus tachycardia   Rate: tachycardic 130  Axis: normal  Ectopy: none  Conduction: normal  ST Segments: ST depression in leads V4-V6, appears similar to previous  T Waves: normal  Clinical Impression: sinus tachycardia, ST depression in leads V4-V6, appears similar to previous      On exam, the patient is afebrile. She is ill-appearing but does not appear toxic. She is hypoxic, in moderate respiratory distress and is diaphoretic. She is tachycardic. She is otherwise hemodynamically stable and neurologically intact. Airway is patent. Neck is supple. Heart sounds are tachycardic and regular. Lungs are diminished to auscultation bilaterally with bibasilar crackles. Evidently, the patient had been taking off her oxygen and did not want to keep it on. She was temporarily placed on a nonrebreather mask with improvement of oxygenation. Respiratory was called to the bedside for high flow and a breathing treatment. The patient's respiratory distress and hypoxia resolved and she was able to rest comfortably. Initial EKG showed sinus tachycardia with ST depression as above which appears similar to previous. Repeat EKG shows increased heart rate with similar ST depression. Labs are obtained and are significant for mild leukocytosis, acute on chronic anemia, acute kidney injury, and elevated troponin, an elevated BNP and an elevated lactic acid. The patient was treated with aspirin, breathing treatment, and Lasix. We recommended admission to the hospital and the patient was agreeable. We discussed the case with the patient's cardiologist, Dr. Juanita Salvador, who will see her in consult.  The patient will be admitted to the hospitalist.    In light of current

## 2020-05-29 NOTE — H&P
History and Physical  Internal Medicine Hospitalist      Name:  Fox Tafoya /Age/Sex: 1935  (82 y.o. female)   MRN & CSN:  [id number] & [id number] Admission Date/Time: 2020  7:28 PM   Location:  ED12/ED-12 PCP: Waleska Gaffney Day: 1          Chief Complaint:  Shortness of Breath     Assessment and Plan:      --- Acute on chronic hypoxic respiratory failure--chronically on 2 L of oxygen at nighttime. Currently requiring 3. 5LPM oxygen. --- Acute on chronic diastolic CHF exacerbation (SOB, worsening leg swelling)  --- Small bilateral pleural effusions-likely CHF related  --- Valvular heart disease-- moderate to severe mitral regurgitation as well as moderate mitral stenosis. ,  Mild to moderate tricuspid regurgitation. --- CASS on CKD 4--baseline creatinine around 1.9. Creatinine today 2.5. suspected to be cardiorenal.  --- Troponin elevation (0.162) - no chest pain. Suspected to be due to CASS. ACS deemed less likely. Plan  Admit to [facility]  IV diuretics  Trend troponin  Place hall  Strict intake and output. Daily BMP  Hold Lisinopril. Cardiology and Nephrology consultation       Other diagnoses  #. Brain aneurysm--is s/p coiling in the past.   #.  Peripheral vascular disease-- s/p aorto bifemoral bypass in the past.  #   HTN - metoprolol. Current diagnosis and plan of management discussed with the patient at the time of admission in lay language who agree to the above plan and disposition of admission for further care. All concerns and questions addressed. Diet  cardiac diet   DVT Prophylaxis [x] Lovenox, []  Heparin, [] SCDs, [] Ambulation  [] Long term AC   GI Prophylaxis [] PPI,  [] H2 Blocker,  [] Carafate,  [] Diet,  [x] No GI prophylaxis,    Code Status  limited code   Disposition To be determined   MDM [] Low, [x] Moderate,[]  High     History of Present Illness:     Principal Problem: SOB, worsening leg swelling X 1 day  Fox Tafoya is a 82 y.o. ( pt stated does not have asthma- with phone assess 2019)    Unspecified osteoarthritis, unspecified site     Unspecified urinary incontinence     VHD (valvular heart disease)     Wears glasses     Wears glasses      Past Surgery History:  Patient  has a past surgical history that includes Cardiac catheterization; Cholecystectomy; brain surgery; Brain aneurysm surgery (); Kidney biopsy (2018); Aorto-femoral Bypass Graft (Left, 1970's); Upper gastrointestinal endoscopy (N/A, 3/9/2019); Colonoscopy (N/A, 3/10/2019); and Colonoscopy (N/A, 2019). Social History:    FAM HX: Assessed: family history includes Heart Disease in her mother; High Blood Pressure in her mother.   Soc HX:   Social History     Socioeconomic History    Marital status:      Spouse name: None    Number of children: 3    Years of education: None    Highest education level: None   Occupational History     Comment: Retired   Social Needs    Financial resource strain: None    Food insecurity     Worry: None     Inability: None    Transportation needs     Medical: None     Non-medical: None   Tobacco Use    Smoking status: Former Smoker     Packs/day: 1.00     Years: 65.00     Pack years: 65.00     Types: Cigarettes     Last attempt to quit: 2018     Years since quittin.5    Smokeless tobacco: Never Used   Substance and Sexual Activity    Alcohol use: No    Drug use: No    Sexual activity: None   Lifestyle    Physical activity     Days per week: None     Minutes per session: None    Stress: None   Relationships    Social connections     Talks on phone: None     Gets together: None     Attends [employer] service: None     Active member of club or organization: None     Attends meetings of clubs or organizations: None     Relationship status: None    Intimate partner violence     Fear of current or ex partner: None     Emotionally abused: None     Physically abused: None     Forced sexual activity: exam:->SOB Reason for Exam: SOB Acuity: Acute Type of Exam: Initial FINDINGS: Bilateral symmetric interstitial and airspace opacities. No pneumothorax. Stable heart size and mediastinal contours. Bilateral interstitial and airspace disease. Differential includes edema versus multifocal infection. Vl Dup Lower Extremity Venous Left    Result Date: 5/9/2020  EXAMINATION: DUPLEX VENOUS ULTRASOUND OF THE LEFT LOWER EXTREMITY 5/9/2020 3:46 am TECHNIQUE: Duplex ultrasound and Doppler images were obtained of the left lower extremity. COMPARISON: None. HISTORY: ORDERING SYSTEM PROVIDED HISTORY: edema x 2 wks TECHNOLOGIST PROVIDED HISTORY: Reason for exam:->edema x 2 wks Reason for Exam: leg pain Acuity: Unknown Type of Exam: Unknown Initial exam FINDINGS: The visualized veins of the left lower extremity are patent and free of echogenic thrombus. The veins are normally compressible and have normal phasic flow. No evidence of DVT in the left lower extremity. EKG this visit:   EKG: Sinus tachycardia, ST depression II, AVF, V5 and V6. Current Treatment Team:  Treatment Team: Attending Provider: Kati Estrada MD; Physician Assistant: Yobany Alonso PA-C; Consulting Physician: MD Chan Zaman Md, MS [facility]  5/28/2020 10:18 PM      Electronically signed by Chan Delcid MD on 5/28/2020 at 10:18 PM      Addendum:  Informed by ED attending that patient was placed on Vapotherm because of desaturation to the 70s while on NC. Plan is to admit to Step down unit.  Will rule out COVID 19.   Pulmonology consultation  Get CHUCHO Mclean, 5/29/2020, 12:14 AM

## 2020-05-29 NOTE — CONSULTS
[address] [facility]               [address] [facility], [address] [facility]                                  CONSULTATION    PATIENT NAME: Chika Mcnamara                    :        1935  MED REC NO:   [id number]                          ROOM:       2030  ACCOUNT NO:   [de-identified]                           ADMIT DATE: 2020  PROVIDER:     Jeff Whitfield MD    CONSULT DATE:  2020    CONSULT REQUESTED BY:  Sussy Prieto MD    REASON FOR CONSULT:  Acute kidney injury with underlying CKD stage IV. BRIEF HISTORY:  The patient is an unfortunate 61-year-old female with  multiple medical conditions, which include but not limited to  progressive CKD and valvular disease who was brought to the emergency  room by her daughter with fever of 101.6 and shortness of breath at  home. In the emergency room, the patient was hemodynamically stable  although the blood pressure was little bit in the lower side 108/47 as  she was taking ARB therapy and underwent several diagnostic tests, which  include imaging and biochemical.  Imaging study, mainly chest x-ray  showed pulmonary edema and pleural effusion. Biochemical testing showed  increased BUN and creatinine of 75 and 2.5 respectively with calcium of  7.8, lactate of 2.4 and slight leukocytosis with 10,800 and anemia with  hemoglobin of 8.7. Because of the fever, she was sent to [facility] floor  after testing for SARS-CoV-2 along with other appropriate culture and  was placed on diuretics. I saw her as a Tele visit on 2020. At that time, I found out that  she was admitted and had acute decompensated heart failure and _____  pneumonia. She was sent home with the calcium channel blocker, but she  started having more and more leg edema. At that time, I opted to stop  it and she restated her torsemide 20 mg b.i.d. and beta blocker was  initiated. She was of course taking ARB therapy at that time.   Since finding. ALLERGIES:  She is listed allergic to LIPITOR as well as SULFA and  ZOCOR. REVIEW OF SYSTEMS:  Fever 101.6, in the emergency room it was 99.6,  increasing shortness of breath, leg edema which is little better now. No other urinary symptoms. Rest of the review of systems is negative or  as in previous paragraph. PHYSICAL EXAMINATION:  VITAL SIGNS:  At the time of examination reveal temperature, T-max was  99.9 here in the emergency room, but at home apparently was 101.6. Blood pressure currently little bit lower side 102/70, respiratory rate  18, and saturating about 100% with nasal cannula and pulse about 78. HEAD AND NECK:  Normocephalic, atraumatic. EYES:  She does have conjunctival pallor at least 2+. EARS, MOUTH AND THROAT:   Normal oral mucosa and oropharynx. CARDIOVASCULAR:  Seems regular rate and rhythm. She does have systolic  ejection murmur as well as split S2 more pronounced over mitral valve  area. RESPIRATORY:  Bibasilar crackles. ABDOMEN:  Soft. EXTREMITIES:  At least 2+ edema. She does have some blister which is  painful in the right mid shin area, lower extremity of course. LABORATORY VALUES AND ANCILLARY SERVICES:  As mentioned earlier. IMPRESSION:  The patient is an 61-year-old female who comes in with  acute kidney injury. 1.  Acute kidney injury on top of CKD stage G4A3 likely from cardiorenal  syndrome type 1, i.e. renal vein congestion, but also may have  infection. We will rule out any other additional etiology. 2.  Progressive CKD with proteinuria mainly from hypertensive  nephrosclerosis. 3.  Atherosclerotic cardiovascular disease status post some intervention  of lower extremity as well as valvular disease, also had some brain  aneurysm of course. PLAN:  I will start with UA and urinary indices. Intensify the  diuretics for now and adjust the dose as she does. Rule out any other  infection.   She is testing for SARS-CoV-2 and follow clinically

## 2020-05-29 NOTE — CARE COORDINATION
Patient was identified as a potential readmission. Chart review completed. Patient has only experienced one other hospital encounter this year. Specifically 5/9/20 - 5/12/20 due to pneumonia. Patient was discharged home alone, is reportedly independent with all ADL's, and denied any needs. Patient presented to the hospital today with complaint of shortness of breath. ED workup revealed acute on chronic hypoxic and hypercapnic respiratory failure, acute on chronic CHF exacerbation, bilateral pleural effusions, CASS on CKD, and elevated troponin. Accordingly, patient requires hospitalization and cannot be safely managed at any alternative lower level of care. Anticipate discharge planning will be needed to re-evaluate post-hospitalization patient care needs.

## 2020-05-29 NOTE — PROGRESS NOTES
acetaminophen (TYLENOL) tablet 650 mg  650 mg Oral Q6H PRN Louis Rodriguez MD        Or    acetaminophen (TYLENOL) suppository 650 mg  650 mg Rectal Q6H PRN Louis Rodriguez MD        albuterol sulfate  (90 Base) MCG/ACT inhaler 2 puff  2 puff Inhalation Q4H PRN Louis Rodriguez MD        metOLazone (ZAROXOLYN) tablet 5 mg  5 mg Oral Daily Jacklyn Merrill MD   5 mg at 05/29/20 0902    albuterol sulfate  (90 Base) MCG/ACT inhaler 2 puff  2 puff Inhalation 4x daily Garry Hawthorne MD        ipratropium (ATROVENT HFA) 17 MCG/ACT inhaler 2 puff  2 puff Inhalation 4x daily Niki Chaparro MD           Subjective:     Patient reports shortness of breath, cough. No fever/chills. No acute overnight events      Objective:   No intake or output data in the 24 hours ending 05/29/20 1832   Vitals:   Vitals:    05/29/20 1700   BP: (!) 101/46   Pulse: 87   Resp: 16   Temp: 99.4 °F (37.4 °C)   SpO2: 100%     Physical Exam:  General Appearance:    Alert, cooperative, no distress  Head:      Normocephalic, without obvious abnormality, atraumatic  Eyes:       Conjunctiva/corneas clear, EOM's intact  Lungs:    B/L crackles and occasional wheeze +  Heart:                Regular rate and rhythm, S1 and S2 normal, no murmur,   rub or gallop  Abdomen:     Soft, non-tender, bowel sounds active, no masses, no organomegaly  Extremities:   B/L pedal edema 3 +  Neurological:   Grossly Intact.     Significant Diagnostic Studies:   DATA:    CBC   Recent Labs     05/28/20 2003   WBC 10.8*   HGB 8.7*   HCT 27.9*         BMP   Recent Labs     05/28/20 2003 05/29/20  0500    140   K 3.9 4.3    99   CO2 21 27   BUN 75* 77*   CREATININE 2.5* 2.7*     LFT'S   Recent Labs     05/28/20 2003   AST 30   ALT 97*   BILITOT 0.4   ALKPHOS 114     COAG   Recent Labs     05/29/20  0500   INR 1.20     POC: No results found for: POCGLU  RthlerkyaxE2W:No results found for: LABA1C  CARDIAC ENZYMES  No results for input(s): CKTOTAL, CKMB, CKMBINDEX, TROPONINI in the last 72 hours. Troponin:   Recent Labs     05/28/20 2004 05/29/20  0500   TROPONINT 0.162* 0.210*     BNP:   Recent Labs     05/28/20 2003   PROBNP 15,762*     U/A:    Lab Results   Component Value Date    COLORU YELLOW 05/29/2020    WBCUA <1 05/29/2020    RBCUA NONE SEEN 05/29/2020    MUCUS RARE 05/29/2020    BACTERIA NEGATIVE 05/29/2020    CLARITYU CLEAR 05/29/2020    SPECGRAV 1.013 05/29/2020    LEUKOCYTESUR NEGATIVE 05/29/2020    BLOODU NEGATIVE 05/29/2020    GLUCOSEU Negative 10/01/2018       Ct Chest Wo Contrast    Result Date: 5/10/2020  EXAMINATION: CT OF THE CHEST WITHOUT CONTRAST 5/10/2020 4:56 pm TECHNIQUE: CT of the chest was performed without the administration of intravenous contrast. Multiplanar reformatted images are provided for review. Dose modulation, iterative reconstruction, and/or weight based adjustment of the mA/kV was utilized to reduce the radiation dose to as low as reasonably achievable. COMPARISON: 5/10/2020, 1/7/2012. HISTORY: ORDERING SYSTEM PROVIDED HISTORY: dyspnea TECHNOLOGIST PROVIDED HISTORY: Reason for exam:->dyspnea Reason for Exam: dyspnea FINDINGS: Mediastinum: The heart is normal in size without a pericardial effusion. The aorta, arch branches, and main pulmonary artery are normal in course and caliber. Severe atherosclerosis including the coronary arteries. No pathologically enlarged mediastinal or hilar nodes. Lungs/pleura: Mild emphysema. Small to moderate bilateral pleural effusions are identified with adjacent atelectasis. Subtle interlobular septal thickening is also seen. Patchy areas of ground-glass in a peribronchovascular distribution are seen in the right upper lobe and left lower lobe. The central airways are patent. Diffuse bronchial wall thickening. No bronchiectasis. Upper Abdomen: Limited evaluation of the upper abdomen demonstrates severe atherosclerosis.   Surgical clips are also seen in the right upper quadrant. Soft Tissues/Bones: No acute or aggressive osseous lesion. Degenerative changes of the spine are present. 1. Small to moderate bilateral pleural effusions with interlobular septal thickening, compatible with mild edema. 2. Subtle patchy areas of ground-glass opacity in a peribronchovascular distribution are also favored to be related to underlying edema. Atypical infection remains a differential consideration. 3. Emphysema. 4. Severe atherosclerosis. Xr Chest Portable    Result Date: 5/28/2020  EXAMINATION: ONE XRAY VIEW OF THE CHEST 5/28/2020 8:01 pm COMPARISON: Chest x-ray May 11, 2020; CT chest May 10, 2020 HISTORY: ORDERING SYSTEM PROVIDED HISTORY: sob TECHNOLOGIST PROVIDED HISTORY: Reason for exam:->sob Reason for Exam: sob Acuity: Acute Type of Exam: Initial Additional signs and symptoms: na Relevant Medical/Surgical History: na FINDINGS: Cardiac silhouette is enlarged but stable. Atherosclerotic changes of the aorta. Persistent chronic appearing interstitial changes of the lungs with question of mild superimposed pulmonary edema. .  Small bilateral effusions. No pneumothorax. Osseous structures appear intact. 1. Similar findings when compared with exam from May 10, 2020 with persistent small bilateral pleural effusions and likely mild superimposed pulmonary edema. Xr Chest Portable    Result Date: 5/13/2020  EXAMINATION: ONE XRAY VIEW OF THE CHEST 5/11/2020 4:00 am COMPARISON: 05/10/2020 HISTORY: ORDERING SYSTEM PROVIDED HISTORY: recheck bilateral pleaural effusions TECHNOLOGIST PROVIDED HISTORY: Reason for exam:->recheck bilateral pleaural effusions Reason for Exam: recheck bilateral pleaural effusions Acuity: Unknown Type of Exam: Subsequent/Follow-up Additional signs and symptoms: recheck bilateral pleaural effusions Relevant Medical/Surgical History: recheck bilateral pleaural effusions FINDINGS: Persistent interstitial pattern is nonspecific.   No

## 2020-05-30 LAB
ALBUMIN SERPL-MCNC: 3 GM/DL (ref 3.4–5)
ALP BLD-CCNC: 115 IU/L (ref 40–128)
ALT SERPL-CCNC: 64 U/L (ref 10–40)
ANION GAP SERPL CALCULATED.3IONS-SCNC: 15 MMOL/L (ref 4–16)
AST SERPL-CCNC: 21 IU/L (ref 15–37)
BASOPHILS ABSOLUTE: 0.1 K/CU MM
BASOPHILS RELATIVE PERCENT: 0.7 % (ref 0–1)
BILIRUB SERPL-MCNC: 0.5 MG/DL (ref 0–1)
BUN BLDV-MCNC: 77 MG/DL (ref 6–23)
CALCIUM SERPL-MCNC: 8.3 MG/DL (ref 8.3–10.6)
CHLORIDE BLD-SCNC: 98 MMOL/L (ref 99–110)
CO2: 26 MMOL/L (ref 21–32)
CREAT SERPL-MCNC: 2.4 MG/DL (ref 0.6–1.1)
DIFFERENTIAL TYPE: ABNORMAL
EOSINOPHILS ABSOLUTE: 1 K/CU MM
EOSINOPHILS RELATIVE PERCENT: 9.4 % (ref 0–3)
GFR AFRICAN AMERICAN: 23 ML/MIN/1.73M2
GFR NON-AFRICAN AMERICAN: 19 ML/MIN/1.73M2
GLUCOSE BLD-MCNC: 101 MG/DL (ref 70–99)
HCT VFR BLD CALC: 25 % (ref 37–47)
HEMOGLOBIN: 7.9 GM/DL (ref 12.5–16)
IMMATURE NEUTROPHIL %: 0.3 % (ref 0–0.43)
LYMPHOCYTES ABSOLUTE: 2 K/CU MM
LYMPHOCYTES RELATIVE PERCENT: 19.1 % (ref 24–44)
MAGNESIUM: 2.2 MG/DL (ref 1.8–2.4)
MCH RBC QN AUTO: 31 PG (ref 27–31)
MCHC RBC AUTO-ENTMCNC: 31.6 % (ref 32–36)
MCV RBC AUTO: 98 FL (ref 78–100)
MONOCYTES ABSOLUTE: 0.5 K/CU MM
MONOCYTES RELATIVE PERCENT: 4.7 % (ref 0–4)
NUCLEATED RBC %: 0 %
PDW BLD-RTO: 14.4 % (ref 11.7–14.9)
PHOSPHORUS: 3.6 MG/DL (ref 2.5–4.9)
PLATELET # BLD: 236 K/CU MM (ref 140–440)
PMV BLD AUTO: 10.6 FL (ref 7.5–11.1)
POTASSIUM SERPL-SCNC: 3.7 MMOL/L (ref 3.5–5.1)
PRO-BNP: ABNORMAL PG/ML
RBC # BLD: 2.55 M/CU MM (ref 4.2–5.4)
SARS-COV-2: NOT DETECTED
SEGMENTED NEUTROPHILS ABSOLUTE COUNT: 6.8 K/CU MM
SEGMENTED NEUTROPHILS RELATIVE PERCENT: 65.8 % (ref 36–66)
SODIUM BLD-SCNC: 139 MMOL/L (ref 135–145)
SOURCE: NORMAL
TOTAL IMMATURE NEUTOROPHIL: 0.03 K/CU MM
TOTAL NUCLEATED RBC: 0 K/CU MM
TOTAL PROTEIN: 5 GM/DL (ref 6.4–8.2)
WBC # BLD: 10.3 K/CU MM (ref 4–10.5)

## 2020-05-30 PROCEDURE — 83735 ASSAY OF MAGNESIUM: CPT

## 2020-05-30 PROCEDURE — 6370000000 HC RX 637 (ALT 250 FOR IP): Performed by: INTERNAL MEDICINE

## 2020-05-30 PROCEDURE — 6360000002 HC RX W HCPCS: Performed by: INTERNAL MEDICINE

## 2020-05-30 PROCEDURE — 80053 COMPREHEN METABOLIC PANEL: CPT

## 2020-05-30 PROCEDURE — 94761 N-INVAS EAR/PLS OXIMETRY MLT: CPT

## 2020-05-30 PROCEDURE — 94640 AIRWAY INHALATION TREATMENT: CPT

## 2020-05-30 PROCEDURE — 83880 ASSAY OF NATRIURETIC PEPTIDE: CPT

## 2020-05-30 PROCEDURE — 2580000003 HC RX 258: Performed by: INTERNAL MEDICINE

## 2020-05-30 PROCEDURE — 84100 ASSAY OF PHOSPHORUS: CPT

## 2020-05-30 PROCEDURE — 2700000000 HC OXYGEN THERAPY PER DAY

## 2020-05-30 PROCEDURE — 2140000000 HC CCU INTERMEDIATE R&B

## 2020-05-30 PROCEDURE — 85025 COMPLETE CBC W/AUTO DIFF WBC: CPT

## 2020-05-30 RX ORDER — METOLAZONE 5 MG/1
2.5 TABLET ORAL DAILY
Status: DISCONTINUED | OUTPATIENT
Start: 2020-05-31 | End: 2020-06-01

## 2020-05-30 RX ORDER — FUROSEMIDE 10 MG/ML
40 INJECTION INTRAMUSCULAR; INTRAVENOUS 2 TIMES DAILY
Status: DISCONTINUED | OUTPATIENT
Start: 2020-05-30 | End: 2020-06-01

## 2020-05-30 RX ORDER — DIGOXIN 0.25 MG/ML
250 INJECTION INTRAMUSCULAR; INTRAVENOUS ONCE
Status: COMPLETED | OUTPATIENT
Start: 2020-05-30 | End: 2020-05-30

## 2020-05-30 RX ADMIN — METOLAZONE 5 MG: 5 TABLET ORAL at 09:38

## 2020-05-30 RX ADMIN — IPRATROPIUM BROMIDE 2 PUFF: 17 AEROSOL, METERED RESPIRATORY (INHALATION) at 07:49

## 2020-05-30 RX ADMIN — SODIUM CHLORIDE, PRESERVATIVE FREE 10 ML: 5 INJECTION INTRAVENOUS at 09:38

## 2020-05-30 RX ADMIN — ALBUTEROL SULFATE 2 PUFF: 90 AEROSOL, METERED RESPIRATORY (INHALATION) at 11:25

## 2020-05-30 RX ADMIN — METOPROLOL TARTRATE 50 MG: 50 TABLET, FILM COATED ORAL at 09:38

## 2020-05-30 RX ADMIN — DIGOXIN 250 MCG: 0.25 INJECTION INTRAMUSCULAR; INTRAVENOUS at 10:06

## 2020-05-30 RX ADMIN — FUROSEMIDE 40 MG: 10 INJECTION, SOLUTION INTRAMUSCULAR; INTRAVENOUS at 09:38

## 2020-05-30 RX ADMIN — FUROSEMIDE 40 MG: 10 INJECTION, SOLUTION INTRAMUSCULAR; INTRAVENOUS at 18:05

## 2020-05-30 RX ADMIN — PRAVASTATIN SODIUM 40 MG: 40 TABLET ORAL at 09:38

## 2020-05-30 RX ADMIN — ALBUTEROL SULFATE 2 PUFF: 90 AEROSOL, METERED RESPIRATORY (INHALATION) at 16:31

## 2020-05-30 RX ADMIN — GABAPENTIN 100 MG: 100 CAPSULE ORAL at 21:41

## 2020-05-30 RX ADMIN — PANTOPRAZOLE SODIUM 40 MG: 40 TABLET, DELAYED RELEASE ORAL at 10:38

## 2020-05-30 RX ADMIN — SODIUM CHLORIDE, PRESERVATIVE FREE 10 ML: 5 INJECTION INTRAVENOUS at 21:41

## 2020-05-30 RX ADMIN — IPRATROPIUM BROMIDE 2 PUFF: 17 AEROSOL, METERED RESPIRATORY (INHALATION) at 11:26

## 2020-05-30 RX ADMIN — IPRATROPIUM BROMIDE 2 PUFF: 17 AEROSOL, METERED RESPIRATORY (INHALATION) at 16:31

## 2020-05-30 RX ADMIN — ENOXAPARIN SODIUM 30 MG: 30 INJECTION SUBCUTANEOUS at 09:38

## 2020-05-30 RX ADMIN — ALBUTEROL SULFATE 2 PUFF: 90 AEROSOL, METERED RESPIRATORY (INHALATION) at 07:49

## 2020-05-30 RX ADMIN — GABAPENTIN 100 MG: 100 CAPSULE ORAL at 09:38

## 2020-05-30 RX ADMIN — CLOPIDOGREL BISULFATE 75 MG: 75 TABLET ORAL at 09:38

## 2020-05-30 ASSESSMENT — PAIN DESCRIPTION - LOCATION
LOCATION: BACK;SHOULDER
LOCATION: BACK

## 2020-05-30 ASSESSMENT — PAIN SCALES - GENERAL
PAINLEVEL_OUTOF10: 0
PAINLEVEL_OUTOF10: 2
PAINLEVEL_OUTOF10: 0
PAINLEVEL_OUTOF10: 1
PAINLEVEL_OUTOF10: 0
PAINLEVEL_OUTOF10: 3
PAINLEVEL_OUTOF10: 3
PAINLEVEL_OUTOF10: 0

## 2020-05-30 ASSESSMENT — PAIN DESCRIPTION - ORIENTATION
ORIENTATION: RIGHT;UPPER
ORIENTATION: RIGHT

## 2020-05-30 ASSESSMENT — PAIN DESCRIPTION - ONSET: ONSET: ON-GOING

## 2020-05-30 ASSESSMENT — PAIN DESCRIPTION - FREQUENCY: FREQUENCY: CONTINUOUS

## 2020-05-30 ASSESSMENT — PAIN - FUNCTIONAL ASSESSMENT: PAIN_FUNCTIONAL_ASSESSMENT: ACTIVITIES ARE NOT PREVENTED

## 2020-05-30 ASSESSMENT — PAIN DESCRIPTION - DESCRIPTORS: DESCRIPTORS: ACHING;DISCOMFORT

## 2020-05-30 ASSESSMENT — PAIN DESCRIPTION - PROGRESSION: CLINICAL_PROGRESSION: NOT CHANGED

## 2020-05-30 ASSESSMENT — PAIN DESCRIPTION - PAIN TYPE
TYPE: ACUTE PAIN
TYPE: ACUTE PAIN

## 2020-05-30 NOTE — PROGRESS NOTES
Pulmonary and Critical Care  Progress Note    Subjective: The patient has improved  Shortness of breath has improved  Chest pain none  Addressing respiratory complaints Patient is negative for  hemoptysis and cyanosis  CONSTITUTIONAL:  negative for fevers and chills      Past Medical History:     has a past medical history of Acute renal failure ([address] Acute respiratory failure ([address] Anemia, Anemia, unspecified, Brain aneurysm, CAD (coronary artery disease), Carotid stenosis, bilateral, Cerebral artery occlusion with cerebral infarction ([address] CHF (congestive heart failure) ([address] Chronic diarrhea, Chronic kidney disease, COPD (chronic obstructive pulmonary disease) ([address] Flash pulmonary edema (HCC), Full dentures, Gastroesophageal reflux disease without esophagitis, Hiatal hernia, History of blood transfusion, Hyperlipidemia, Hypertension, Irritable bowel disease, Left carotid bruit, Localized edema, Low back pain, Nicotine dependence, other tobacco product, uncomplicated, On home oxygen therapy, Osteoporosis, PONV (postoperative nausea and vomiting), Proteinuria, unspecified, PVD (peripheral vascular disease) ([address] Severe mitral regurgitation, Solitary cyst of left breast, Subclavian steal syndrome, TIA (transient ischemic attack), Unspecified asthma, uncomplicated, Unspecified osteoarthritis, unspecified site, Unspecified urinary incontinence, VHD (valvular heart disease), Wears glasses, and Wears glasses. has a past surgical history that includes Cardiac catheterization; Cholecystectomy; brain surgery; Brain aneurysm surgery (2005); Kidney biopsy (11/05/2018); Aorto-femoral Bypass Graft (Left, 1970's); Upper gastrointestinal endoscopy (N/A, 3/9/2019); Colonoscopy (N/A, 3/10/2019); and Colonoscopy (N/A, 4/17/2019). reports that she quit smoking about 18 months ago. Her smoking use included cigarettes. She has a 65.00 pack-year smoking history.  She has never used smokeless tobacco. She reports that she filtration rate (GFR) between 60-89 mL/min/1.73 square meter and albuminuria creatinine ratio greater than 300 mg/g    Chronic kidney disease (CKD) stage G1/A3, glomerular filtration rate (GFR) equal to or greater than 90 mL/min/1.73 square meter and albuminuria creatinine ratio greater than 300 mg/g    Flash pulmonary edema (HCC)    Acute respiratory failure with hypoxia (HCC)    Valvular disease    Chronic kidney disease (CKD) stage G3b/A3, moderately decreased glomerular filtration rate (GFR) between 30-44 mL/min/1.73 square meter and albuminuria creatinine ratio greater than 300 mg/g (HCC)    CASS (acute kidney injury) [facility]    Iron deficiency anemia    Gastrointestinal hemorrhage associated with anorectal source    Acute on chronic respiratory failure with hypoxia and hypercapnia (HCC)    Pneumonia due to organism    Heart failure ([facility]       Plan:   1. Overall the patient has improved  2. Decongestive therapy. 3. Wean FiO2.     Isaak Bell MD  5/30/2020  1:43 PM

## 2020-05-30 NOTE — PROGRESS NOTES
Nephrology Progress Note  5/30/2020 11:10 AM  Subjective:   Admit Date: 5/28/2020  PCP: Lesa Hickman DO  Interval History: pt weak and arousable in bed nad    Diet: DIET LOW SODIUM 2 GM;  Pain is:Mild      Data:   Scheduled Meds:   clopidogrel  75 mg Oral Daily    pantoprazole  40 mg Oral QAM AC    gabapentin  100 mg Oral BID    metoprolol tartrate  50 mg Oral BID    pravastatin  40 mg Oral Daily    sodium chloride flush  10 mL Intravenous 2 times per day    enoxaparin  30 mg Subcutaneous Daily    furosemide  40 mg Intravenous TID    metOLazone  5 mg Oral Daily    albuterol sulfate HFA  2 puff Inhalation 4x daily    ipratropium  2 puff Inhalation 4x daily     Continuous Infusions:  PRN Meds:sodium chloride flush, polyethylene glycol, promethazine **OR** ondansetron, acetaminophen **OR** acetaminophen, albuterol sulfate HFA  No intake/output data recorded. No intake/output data recorded. No intake or output data in the 24 hours ending 05/30/20 1110  CBC:   Recent Labs     05/28/20 2003 05/30/20  0430   WBC 10.8* 10.3   HGB 8.7* 7.9*    236     BMP:    Recent Labs     05/28/20 2003 05/29/20  0500 05/30/20  0430    140 139   K 3.9 4.3 3.7    99 98*   CO2 21 27 26   BUN 75* 77* 77*   CREATININE 2.5* 2.7* 2.4*   GLUCOSE 198* 119* 101*     Hepatic:   Recent Labs     05/28/20 2003 05/30/20  0430   AST 30 21   ALT 97* 64*   BILITOT 0.4 0.5   ALKPHOS 114 115     Troponin: No results for input(s): TROPONINI in the last 72 hours. BNP: No results for input(s): BNP in the last 72 hours. Lipids: No results for input(s): CHOL, HDL in the last 72 hours.     Invalid input(s): LDLCALCU  ABGs:   Lab Results   Component Value Date    PO2ART 62 11/08/2018    XLE0KFA 39.0 11/08/2018     INR:   Recent Labs     05/29/20  0500   INR 1.20     Renal Labs  Albumin:    Lab Results   Component Value Date    LABALBU 3.0 05/30/2020    LABALBU 100 03/17/2017     Calcium:    Lab Results   Component Value Date ([facility]     Valvular disease     Chronic kidney disease (CKD) stage G3b/A3, moderately decreased glomerular filtration rate (GFR) between 30-44 mL/min/1.73 square meter and albuminuria creatinine ratio greater than 300 mg/g (HCC)     CASS (acute kidney injury) ([facility]     Iron deficiency anemia     Gastrointestinal hemorrhage associated with anorectal source     Acute on chronic respiratory failure with hypoxia and hypercapnia ([facility])     Pneumonia due to organism     Heart failure (HCC)    Assessment and Plan:      IMP:  arf from atn on ckd 4 with cardiorenal  Hypotension with proteinuria  ascvd  sob  Anemia    Plan     Renal slight improve not uremic and monitor  bp low stable and decrease on diuretic with cardiorenal  Cardiac stable  Improved o2  Monitor hb - prbc if drop  Will follow           Vipin Lara MD

## 2020-05-30 NOTE — PROGRESS NOTES
Hospitalist Progress Note         Admit Date: 5/28/2020    PCP: Janie Hdz DO     Chief Complaint   Patient presents with    Fever    Shortness of Breath        Assessment and Plan:      Acute on chronic diastolic heart failure (HCC) EF 55% continue diuresis. Cardio on board   Acute respiratory failure with hypoxia (HCC)/COPD O2 support, inhaler. COVID negative   CKD stage III diuresis as per cardiology/nephrology. On Lasix and metolazone.  Troponin elevation type II from hypoxic respiratory failure: On Plavix, statin and metoprolol. VTE prophylaxis LMWH.   Transfer out of COVID unit    Current Facility-Administered Medications   Medication Dose Route Frequency Provider Last Rate Last Dose    furosemide (LASIX) injection 40 mg  40 mg Intravenous BID MD Emigdio Amado [START ON 5/31/2020] metOLazone (ZAROXOLYN) tablet 2.5 mg  2.5 mg Oral Daily Heath Aj MD        clopidogrel (PLAVIX) tablet 75 mg  75 mg Oral Daily Shari Bush MD   75 mg at 05/30/20 0938    pantoprazole (PROTONIX) tablet 40 mg  40 mg Oral QAM AC Shari Bush MD   40 mg at 05/30/20 1038    gabapentin (NEURONTIN) capsule 100 mg  100 mg Oral BID Shari Bush MD   100 mg at 05/30/20 2937    metoprolol tartrate (LOPRESSOR) tablet 50 mg  50 mg Oral BID Shari Bush MD   50 mg at 05/30/20 1000    pravastatin (PRAVACHOL) tablet 40 mg  40 mg Oral Daily Shari Bush MD   40 mg at 05/30/20 4338    sodium chloride flush 0.9 % injection 10 mL  10 mL Intravenous 2 times per day Shari Bush MD   10 mL at 05/30/20 3427    sodium chloride flush 0.9 % injection 10 mL  10 mL Intravenous PRN Shari Bush MD        polyethylene glycol (GLYCOLAX) packet 17 g  17 g Oral Daily PRN Shari Bush MD        promethazine (PHENERGAN) tablet 12.5 mg  12.5 mg Oral Q6H PRN Shari Bush MD        Or    ondansetron (ZOFRAN) injection 4 mg  4 mg Intravenous Q6H PRN MD Emigdio Kaur enoxaparin (LOVENOX) injection 30 mg  30 mg Subcutaneous Daily Emily Alonso MD   30 mg at 05/30/20 3235    acetaminophen (TYLENOL) tablet 650 mg  650 mg Oral Q6H PRN Emily Alonso MD        Or   Juany Moore acetaminophen (TYLENOL) suppository 650 mg  650 mg Rectal Q6H PRN Emily Alonso MD        albuterol sulfate  (90 Base) MCG/ACT inhaler 2 puff  2 puff Inhalation Q4H PRN Emily Alonso MD        albuterol sulfate  (90 Base) MCG/ACT inhaler 2 puff  2 puff Inhalation 4x daily Yogi Liu MD   2 puff at 05/30/20 1125    ipratropium (ATROVENT HFA) 17 MCG/ACT inhaler 2 puff  2 puff Inhalation 4x daily Calixnadege Liu MD   2 puff at 05/30/20 1126       Subjective:     Patient reports improving shortness of breath, cough. No fever/chills. No acute overnight events      Objective:   No intake or output data in the 24 hours ending 05/30/20 1514   Vitals:   Vitals:    05/30/20 0945   BP:    Pulse: 111   Resp:    Temp:    SpO2:      Physical Exam:  General Appearance:    Alert, cooperative, no distress  Head:      Normocephalic, without obvious abnormality, atraumatic  Eyes:       Conjunctiva/corneas clear, EOM's intact  Lungs:  Improving B/L crackles and occasional wheeze +  Heart:                Regular rate and rhythm, S1 and S2 normal, no murmur,   rub or gallop  Abdomen:     Soft, non-tender, bowel sounds active, no masses, no organomegaly  Extremities: Improving B/L pedal edema 2 +  Neurological:   Grossly Intact.     Significant Diagnostic Studies:   DATA:    CBC   Recent Labs     05/28/20 2003 05/30/20  0430   WBC 10.8* 10.3   HGB 8.7* 7.9*   HCT 27.9* 25.0*    236      BMP   Recent Labs     05/28/20 2003 05/29/20  0500 05/30/20  0430    140 139   K 3.9 4.3 3.7    99 98*   CO2 21 27 26   PHOS  --   --  3.6   BUN 75* 77* 77*   CREATININE 2.5* 2.7* 2.4*     LFT'S   Recent Labs     05/28/20 2003 05/30/20  0430   AST 30 21   ALT 97* 64*   BILITOT 0.4 0.5

## 2020-05-30 NOTE — PROGRESS NOTES
Valvular disease    Chronic kidney disease (CKD) stage G3b/A3, moderately decreased glomerular filtration rate (GFR) between 30-44 mL/min/1.73 square meter and albuminuria creatinine ratio greater than 300 mg/g (HCC)    CASS (acute kidney injury) (HCC)    Iron deficiency anemia    Gastrointestinal hemorrhage associated with anorectal source    Acute on chronic respiratory failure with hypoxia and hypercapnia (HCC)    Pneumonia due to organism    Heart failure (HCC)        Allergies   Allergen Reactions    Lipitor [Atorvastatin]     Sulfa Antibiotics Hives    Zocor [Simvastatin]         Current Inpatient Medications:    Current Facility-Administered Medications   Medication Dose Route Frequency Provider Last Rate Last Dose    clopidogrel (PLAVIX) tablet 75 mg  75 mg Oral Daily Jose Manuel Pool MD   75 mg at 05/30/20 0938    pantoprazole (PROTONIX) tablet 40 mg  40 mg Oral QAM AC Jose Manuel Pool MD   40 mg at 05/29/20 0625    gabapentin (NEURONTIN) capsule 100 mg  100 mg Oral BID Jose Manuel Pool MD   100 mg at 05/30/20 4179    metoprolol tartrate (LOPRESSOR) tablet 50 mg  50 mg Oral BID Jose Manuel Pool MD   50 mg at 05/30/20 7234    pravastatin (PRAVACHOL) tablet 40 mg  40 mg Oral Daily Jose Manuel Pool MD   40 mg at 05/30/20 5224    sodium chloride flush 0.9 % injection 10 mL  10 mL Intravenous 2 times per day Jose Manuel Pool MD   10 mL at 05/30/20 3326    sodium chloride flush 0.9 % injection 10 mL  10 mL Intravenous PRN Jose Manuel Pool MD        polyethylene glycol (GLYCOLAX) packet 17 g  17 g Oral Daily PRN Jose Manuel Pool MD        promethazine (PHENERGAN) tablet 12.5 mg  12.5 mg Oral Q6H PRN Jose Manuel Pool MD        Or    ondansetron (ZOFRAN) injection 4 mg  4 mg Intravenous Q6H PRN Jose Manuel Pool MD        enoxaparin (LOVENOX) injection 30 mg  30 mg Subcutaneous Daily Jose Manuel Pool MD   30 mg at 05/30/20 0938    furosemide (LASIX) injection 40 mg  40 mg Intravenous TID Octavio Avilez MD   40 mg at 05/30/20 8487    acetaminophen (TYLENOL) tablet 650 mg  650 mg Oral Q6H PRN Octavio Avilez MD        Or   Marycarmen Swift acetaminophen (TYLENOL) suppository 650 mg  650 mg Rectal Q6H PRN Octavio Avilez MD        albuterol sulfate  (90 Base) MCG/ACT inhaler 2 puff  2 puff Inhalation Q4H PRN Octavio Avilez MD        metOLazone (ZAROXOLYN) tablet 5 mg  5 mg Oral Daily Doyle Lunsford MD   5 mg at 05/30/20 0938    albuterol sulfate  (90 Base) MCG/ACT inhaler 2 puff  2 puff Inhalation 4x daily Linnea Rincon MD   2 puff at 05/30/20 0749    ipratropium (ATROVENT HFA) 17 MCG/ACT inhaler 2 puff  2 puff Inhalation 4x daily Linnea Rincon MD   2 puff at 05/30/20 0749           Labs:  CBC with Differential:    Lab Results   Component Value Date    WBC 10.3 05/30/2020    RBC 2.55 05/30/2020    HGB 7.9 05/30/2020    HCT 25.0 05/30/2020     05/30/2020    MCV 98.0 05/30/2020    MCH 31.0 05/30/2020    MCHC 31.6 05/30/2020    RDW 14.4 05/30/2020    SEGSPCT 65.8 05/30/2020    LYMPHOPCT 19.1 05/30/2020    MONOPCT 4.7 05/30/2020    EOSPCT 3.6 08/29/2011    BASOPCT 0.7 05/30/2020    MONOSABS 0.5 05/30/2020    LYMPHSABS 2.0 05/30/2020    EOSABS 1.0 05/30/2020    BASOSABS 0.1 05/30/2020    DIFFTYPE AUTOMATED DIFFERENTIAL 05/30/2020     CMP:    Lab Results   Component Value Date     05/30/2020    K 3.7 05/30/2020    CL 98 05/30/2020    CO2 26 05/30/2020    BUN 77 05/30/2020    CREATININE 2.4 05/30/2020    GFRAA 23 05/30/2020    LABGLOM 19 05/30/2020    GLUCOSE 101 05/30/2020    PROT 5.0 05/30/2020    PROT 6.3 11/10/2012    LABALBU 3.0 05/30/2020    LABALBU 100 03/17/2017    CALCIUM 8.3 05/30/2020    BILITOT 0.5 05/30/2020    ALKPHOS 115 05/30/2020    AST 21 05/30/2020    ALT 64 05/30/2020     Hepatic Function Panel:    Lab Results   Component Value Date    ALKPHOS 115 05/30/2020    ALT 64 05/30/2020    AST 21 05/30/2020    PROT 5.0 05/30/2020    PROT 6.3 at 05/29/20 0902    sodium chloride flush 0.9 % injection 10 mL  10 mL Intravenous 2 times per day Shari Bush MD   10 mL at 05/29/20 0902    sodium chloride flush 0.9 % injection 10 mL  10 mL Intravenous PRN Shari Bush MD        polyethylene glycol (GLYCOLAX) packet 17 g  17 g Oral Daily PRN Shari Bush MD        promethazine (PHENERGAN) tablet 12.5 mg  12.5 mg Oral Q6H PRN Shari Bush MD         Or    ondansetron (ZOFRAN) injection 4 mg  4 mg Intravenous Q6H PRN Shari Bush MD        enoxaparin (LOVENOX) injection 30 mg  30 mg Subcutaneous Daily Shari Bush MD   30 mg at 05/29/20 0902    furosemide (LASIX) injection 40 mg  40 mg Intravenous TID Shari Bush MD   40 mg at 05/29/20 0902    acetaminophen (TYLENOL) tablet 650 mg  650 mg Oral Q6H PRN Shari Bush MD         Or    acetaminophen (TYLENOL) suppository 650 mg  650 mg Rectal Q6H PRN Shari Bush MD        albuterol sulfate  (90 Base) MCG/ACT inhaler 2 puff  2 puff Inhalation Q4H PRN Shari Bush MD        metOLazone (ZAROXOLYN) tablet 5 mg  5 mg Oral Daily Siegfried Baumgarten, MD   5 mg at 05/29/20 0902         Review of Systems:   · Constitutional: No Fever or Weight Loss   · Eyes: No Decreased Vision  · ENT: No Headaches, Hearing Loss or Vertigo  · Cardiovascular: No chest pain, dyspnea on exertion, palpitations or loss of consciousness  · Respiratory: No cough or wheezing    · Gastrointestinal: No abdominal pain, appetite loss, blood in stools, constipation, diarrhea or heartburn  · Genitourinary: No dysuria, trouble voiding, or hematuria  · Musculoskeletal:  No gait disturbance, weakness or joint complaints  · Integumentary: No rash or pruritis  · Neurological: No TIA or stroke symptoms  · Psychiatric: No anxiety or depression  · Endocrine: No malaise, fatigue or temperature intolerance  · Hematologic/Lymphatic: No bleeding problems, blood clots or swollen lymph

## 2020-05-31 LAB
ALBUMIN SERPL-MCNC: 2.8 GM/DL (ref 3.4–5)
ANION GAP SERPL CALCULATED.3IONS-SCNC: 14 MMOL/L (ref 4–16)
BUN BLDV-MCNC: 85 MG/DL (ref 6–23)
CALCIUM SERPL-MCNC: 8.2 MG/DL (ref 8.3–10.6)
CHLORIDE BLD-SCNC: 91 MMOL/L (ref 99–110)
CO2: 31 MMOL/L (ref 21–32)
CREAT SERPL-MCNC: 2.5 MG/DL (ref 0.6–1.1)
GFR AFRICAN AMERICAN: 22 ML/MIN/1.73M2
GFR NON-AFRICAN AMERICAN: 18 ML/MIN/1.73M2
GLUCOSE BLD-MCNC: 101 MG/DL (ref 70–99)
IRON: 250 UG/DL (ref 37–145)
MAGNESIUM: 2 MG/DL (ref 1.8–2.4)
PCT TRANSFERRIN: 71 % (ref 10–44)
PHOSPHORUS: 4.7 MG/DL (ref 2.5–4.9)
POTASSIUM SERPL-SCNC: 3.4 MMOL/L (ref 3.5–5.1)
PRO-BNP: ABNORMAL PG/ML
SODIUM BLD-SCNC: 136 MMOL/L (ref 135–145)
TOTAL IRON BINDING CAPACITY: 351 UG/DL (ref 250–450)
UNSATURATED IRON BINDING CAPACITY: 101 UG/DL (ref 110–370)

## 2020-05-31 PROCEDURE — 94761 N-INVAS EAR/PLS OXIMETRY MLT: CPT

## 2020-05-31 PROCEDURE — 6360000002 HC RX W HCPCS: Performed by: INTERNAL MEDICINE

## 2020-05-31 PROCEDURE — 6370000000 HC RX 637 (ALT 250 FOR IP): Performed by: INTERNAL MEDICINE

## 2020-05-31 PROCEDURE — 80069 RENAL FUNCTION PANEL: CPT

## 2020-05-31 PROCEDURE — 2500000003 HC RX 250 WO HCPCS: Performed by: INTERNAL MEDICINE

## 2020-05-31 PROCEDURE — 94640 AIRWAY INHALATION TREATMENT: CPT

## 2020-05-31 PROCEDURE — 2580000003 HC RX 258: Performed by: INTERNAL MEDICINE

## 2020-05-31 PROCEDURE — 2140000000 HC CCU INTERMEDIATE R&B

## 2020-05-31 PROCEDURE — 83550 IRON BINDING TEST: CPT

## 2020-05-31 PROCEDURE — 83735 ASSAY OF MAGNESIUM: CPT

## 2020-05-31 PROCEDURE — 83540 ASSAY OF IRON: CPT

## 2020-05-31 PROCEDURE — 36415 COLL VENOUS BLD VENIPUNCTURE: CPT

## 2020-05-31 PROCEDURE — 83880 ASSAY OF NATRIURETIC PEPTIDE: CPT

## 2020-05-31 RX ORDER — DILTIAZEM HYDROCHLORIDE 60 MG/1
60 TABLET, FILM COATED ORAL EVERY 6 HOURS SCHEDULED
Status: DISCONTINUED | OUTPATIENT
Start: 2020-05-31 | End: 2020-06-01

## 2020-05-31 RX ORDER — MIDODRINE HYDROCHLORIDE 5 MG/1
5 TABLET ORAL
Status: DISCONTINUED | OUTPATIENT
Start: 2020-05-31 | End: 2020-06-01 | Stop reason: HOSPADM

## 2020-05-31 RX ADMIN — SODIUM CHLORIDE, PRESERVATIVE FREE 10 ML: 5 INJECTION INTRAVENOUS at 09:03

## 2020-05-31 RX ADMIN — GABAPENTIN 100 MG: 100 CAPSULE ORAL at 09:02

## 2020-05-31 RX ADMIN — IPRATROPIUM BROMIDE 2 PUFF: 17 AEROSOL, METERED RESPIRATORY (INHALATION) at 07:56

## 2020-05-31 RX ADMIN — DILTIAZEM HYDROCHLORIDE 60 MG: 60 TABLET, FILM COATED ORAL at 11:42

## 2020-05-31 RX ADMIN — IPRATROPIUM BROMIDE 2 PUFF: 17 AEROSOL, METERED RESPIRATORY (INHALATION) at 20:55

## 2020-05-31 RX ADMIN — PANTOPRAZOLE SODIUM 40 MG: 40 TABLET, DELAYED RELEASE ORAL at 06:23

## 2020-05-31 RX ADMIN — APIXABAN 2.5 MG: 2.5 TABLET, FILM COATED ORAL at 11:43

## 2020-05-31 RX ADMIN — ACETAMINOPHEN 650 MG: 325 TABLET ORAL at 01:42

## 2020-05-31 RX ADMIN — IPRATROPIUM BROMIDE 2 PUFF: 17 AEROSOL, METERED RESPIRATORY (INHALATION) at 15:22

## 2020-05-31 RX ADMIN — METOPROLOL TARTRATE 50 MG: 50 TABLET, FILM COATED ORAL at 09:02

## 2020-05-31 RX ADMIN — PRAVASTATIN SODIUM 40 MG: 40 TABLET ORAL at 09:02

## 2020-05-31 RX ADMIN — ACETAMINOPHEN 650 MG: 325 TABLET ORAL at 17:01

## 2020-05-31 RX ADMIN — CLOPIDOGREL BISULFATE 75 MG: 75 TABLET ORAL at 09:02

## 2020-05-31 RX ADMIN — IRON SUCROSE 200 MG: 20 INJECTION, SOLUTION INTRAVENOUS at 13:55

## 2020-05-31 RX ADMIN — ALBUTEROL SULFATE 2 PUFF: 90 AEROSOL, METERED RESPIRATORY (INHALATION) at 07:56

## 2020-05-31 RX ADMIN — MIDODRINE HYDROCHLORIDE 5 MG: 5 TABLET ORAL at 17:49

## 2020-05-31 RX ADMIN — MIDODRINE HYDROCHLORIDE 5 MG: 5 TABLET ORAL at 13:52

## 2020-05-31 RX ADMIN — METOLAZONE 2.5 MG: 5 TABLET ORAL at 09:02

## 2020-05-31 RX ADMIN — ALBUTEROL SULFATE 2 PUFF: 90 AEROSOL, METERED RESPIRATORY (INHALATION) at 20:54

## 2020-05-31 RX ADMIN — SODIUM CHLORIDE, PRESERVATIVE FREE 10 ML: 5 INJECTION INTRAVENOUS at 20:36

## 2020-05-31 RX ADMIN — GABAPENTIN 100 MG: 100 CAPSULE ORAL at 20:36

## 2020-05-31 RX ADMIN — FUROSEMIDE 40 MG: 10 INJECTION, SOLUTION INTRAMUSCULAR; INTRAVENOUS at 09:02

## 2020-05-31 RX ADMIN — APIXABAN 2.5 MG: 2.5 TABLET, FILM COATED ORAL at 20:36

## 2020-05-31 RX ADMIN — ALBUTEROL SULFATE 2 PUFF: 90 AEROSOL, METERED RESPIRATORY (INHALATION) at 11:40

## 2020-05-31 RX ADMIN — FUROSEMIDE 40 MG: 10 INJECTION, SOLUTION INTRAMUSCULAR; INTRAVENOUS at 17:49

## 2020-05-31 RX ADMIN — ALBUTEROL SULFATE 2 PUFF: 90 AEROSOL, METERED RESPIRATORY (INHALATION) at 15:23

## 2020-05-31 RX ADMIN — DEXTROSE MONOHYDRATE 5 MG/HR: 50 INJECTION, SOLUTION INTRAVENOUS at 01:43

## 2020-05-31 RX ADMIN — ENOXAPARIN SODIUM 30 MG: 30 INJECTION SUBCUTANEOUS at 09:02

## 2020-05-31 RX ADMIN — IPRATROPIUM BROMIDE 2 PUFF: 17 AEROSOL, METERED RESPIRATORY (INHALATION) at 11:40

## 2020-05-31 ASSESSMENT — PAIN DESCRIPTION - ORIENTATION: ORIENTATION: RIGHT;UPPER

## 2020-05-31 ASSESSMENT — PAIN SCALES - GENERAL
PAINLEVEL_OUTOF10: 3
PAINLEVEL_OUTOF10: 3

## 2020-05-31 ASSESSMENT — PAIN DESCRIPTION - PAIN TYPE: TYPE: ACUTE PAIN

## 2020-05-31 ASSESSMENT — PAIN DESCRIPTION - LOCATION: LOCATION: BACK

## 2020-05-31 ASSESSMENT — PAIN DESCRIPTION - PROGRESSION: CLINICAL_PROGRESSION: NOT CHANGED

## 2020-05-31 ASSESSMENT — PAIN DESCRIPTION - DESCRIPTORS: DESCRIPTORS: ACHING;DISCOMFORT

## 2020-05-31 ASSESSMENT — PAIN - FUNCTIONAL ASSESSMENT: PAIN_FUNCTIONAL_ASSESSMENT: ACTIVITIES ARE NOT PREVENTED

## 2020-05-31 ASSESSMENT — PAIN DESCRIPTION - FREQUENCY: FREQUENCY: CONTINUOUS

## 2020-05-31 ASSESSMENT — PAIN DESCRIPTION - ONSET: ONSET: ON-GOING

## 2020-05-31 NOTE — PROGRESS NOTES
ecchymoses. MSK Spontaneous movement of all extremities. No gross joint deformities. SKIN Normal coloration, warm, dry. NEURO Cranial nerves appear grossly intact, normal speech, no lateralizing weakness. PSYCH Awake, alert, oriented x 4. Affect appropriate.     INTAKE: In: 240 [P.O.:240]  Out: -   OUTPUT: In: 240   Out: -     LABS  Recent Labs     05/28/20 2003 05/30/20  0430   WBC 10.8* 10.3   HGB 8.7* 7.9*   HCT 27.9* 25.0*    236      Recent Labs     05/28/20 2003 05/29/20  0500 05/30/20  0430    140 139   K 3.9 4.3 3.7    99 98*   CO2 21 27 26   PHOS  --   --  3.6   BUN 75* 77* 77*   CREATININE 2.5* 2.7* 2.4*     Recent Labs     05/28/20 2003 05/30/20  0430   AST 30 21   ALT 97* 64*   BILITOT 0.4 0.5   ALKPHOS 114 115     Recent Labs     05/29/20  0500   INR 1.20     Recent Labs     05/28/20 2004 05/29/20  0500   TROPONINT 0.162* 0.210*          Abnormal labs for today noted      Imaging:     ECHO:    Microbiology:  Blood culture:    Urine culture:    Sputum culture:    Procedures done this admission:    MEDS  Scheduled Meds:   dilTIAZem  60 mg Oral 4 times per day    apixaban  2.5 mg Oral BID    iron sucrose  200 mg Intravenous Q24H    midodrine  5 mg Oral TID WC    furosemide  40 mg Intravenous BID    metOLazone  2.5 mg Oral Daily    clopidogrel  75 mg Oral Daily    pantoprazole  40 mg Oral QAM AC    gabapentin  100 mg Oral BID    pravastatin  40 mg Oral Daily    sodium chloride flush  10 mL Intravenous 2 times per day    albuterol sulfate HFA  2 puff Inhalation 4x daily    ipratropium  2 puff Inhalation 4x daily     Continuous Infusions:   diltiazem (CARDIZEM) 100 mg in dextrose 5% 100 mL ([facility]) Stopped (05/31/20 1143)     PRN Meds:sodium chloride flush, polyethylene glycol, promethazine **OR** ondansetron, acetaminophen **OR** acetaminophen, albuterol sulfate HFA        ASSESSMENT and [address] [facility]: 4    1-Acute hypoxic respiratory failure due to

## 2020-05-31 NOTE — PROGRESS NOTES
Daily Progress Note     patient is awake alert feeling better  Remain in afib rate is 120-150- started on cardizem drip --heart rate in 100 now  Will change to PO cardizem  HFpEF due to valvular heart dx   Moderate MS and moderate MR- she does not want surgery and she would be a high surgical candidate   Hx of CAD  Hx of PAD  Renal insuffiencey   Suggest palliative care also    Will start on low dose AC for afib   Overall prognosis is guarded   increase activity       Echo ==Summary   Left ventricular function is normal, EF is estimated at 55-60%. Moderate left ventricular hypertrophy. No regional wall motion abnormalities were detected. Mildly dilated left atrium. Sclerotic, but non-stenotic aortic valve. Severe Mitral annular calcification is present. Moderate mitral stenosis with a mean gradient of 7 mmHg. Moderate to Severe mitral regurgitation is present. Mild to moderate tricuspid regurgitation. RVSP is 4o mmHg. Mild pulmonary hypertension noted   No evidence of pericardial effusion.      The patient had a DARLYN done back in 11/2018. Catha Sample DARLYN at that time had  shown that LV function was preserved.  Aortic valve was heavily  calcified with some aortic stenosis noted.  Mitral valve was heavily  calcified with moderate mitral stenosis and moderate-to-severe mitral  regurgitation noted.  Moderate tricuspid regurgitation was also present.     PAST MEDICAL HISTORY:  The patient has a significant history of valvular  heart disease present including aortic valve, mitral valve, and  tricuspid valve.  She is known to have coronary artery disease.  She had  an angioplasty done in the past.  She also has peripheral vascular  disease.  She had a cutting balloon angioplasty of the right common  femoral artery done and right SFA.  She also had aortofemoral bypass  surgery done in 1989 by Dr. Jalen Ghosh of COPD, renal  insufficiency, coronary artery disease, history of strokes, valvular  heart

## 2020-05-31 NOTE — PROGRESS NOTES
Nephrology Progress Note  5/31/2020 10:40 AM  Subjective:   Admit Date: 5/28/2020  PCP: Alexey Greenfield,   Interval History: pt more awake and weak with edema    Diet: DIET LOW SODIUM 2 GM;  Pain is:Mild      Data:   Scheduled Meds:   dilTIAZem  60 mg Oral 4 times per day    apixaban  2.5 mg Oral BID    furosemide  40 mg Intravenous BID    metOLazone  2.5 mg Oral Daily    clopidogrel  75 mg Oral Daily    pantoprazole  40 mg Oral QAM AC    gabapentin  100 mg Oral BID    pravastatin  40 mg Oral Daily    sodium chloride flush  10 mL Intravenous 2 times per day    albuterol sulfate HFA  2 puff Inhalation 4x daily    ipratropium  2 puff Inhalation 4x daily     Continuous Infusions:   diltiazem (CARDIZEM) 100 mg in dextrose 5% 100 mL (ADD-[place]) 2.5 mg/hr (05/31/20 0233)     PRN Meds:sodium chloride flush, polyethylene glycol, promethazine **OR** ondansetron, acetaminophen **OR** acetaminophen, albuterol sulfate HFA  No intake/output data recorded. I/O this shift:  In: 240 [P.O.:240]  Out: -     Intake/Output Summary (Last 24 hours) at 5/31/2020 1040  Last data filed at 5/31/2020 0907  Gross per 24 hour   Intake 240 ml   Output --   Net 240 ml     CBC:   Recent Labs     05/28/20 2003 05/30/20  0430   WBC 10.8* 10.3   HGB 8.7* 7.9*    236     BMP:    Recent Labs     05/28/20 2003 05/29/20  0500 05/30/20  0430    140 139   K 3.9 4.3 3.7    99 98*   CO2 21 27 26   BUN 75* 77* 77*   CREATININE 2.5* 2.7* 2.4*   GLUCOSE 198* 119* 101*     Hepatic:   Recent Labs     05/28/20 2003 05/30/20  0430   AST 30 21   ALT 97* 64*   BILITOT 0.4 0.5   ALKPHOS 114 115     Troponin: No results for input(s): TROPONINI in the last 72 hours. BNP: No results for input(s): BNP in the last 72 hours. Lipids: No results for input(s): CHOL, HDL in the last 72 hours.     Invalid input(s): LDLCALCU  ABGs:   Lab Results   Component Value Date    PO2ART 62 11/08/2018    PDC7OMD 39.0 11/08/2018     INR:   Recent Labs 05/29/20  0500   INR 1.20     Renal Labs  Albumin:    Lab Results   Component Value Date    LABALBU 3.0 05/30/2020    LABALBU 100 03/17/2017     Calcium:    Lab Results   Component Value Date    CALCIUM 8.3 05/30/2020     Phosphorus:    Lab Results   Component Value Date    PHOS 3.6 05/30/2020     U/A:    Lab Results   Component Value Date    NITRU NEGATIVE 05/29/2020    NITRU Negative 10/01/2018    COLORU YELLOW 05/29/2020    PHUR 7.0 10/01/2018    WBCUA <1 05/29/2020    RBCUA NONE SEEN 05/29/2020    MUCUS RARE 05/29/2020    TRICHOMONAS NONE SEEN 05/29/2020    BACTERIA NEGATIVE 05/29/2020    CLARITYU CLEAR 05/29/2020    SPECGRAV 1.013 05/29/2020    UROBILINOGEN NORMAL 05/29/2020    BILIRUBINUR NEGATIVE 05/29/2020    BLOODU NEGATIVE 05/29/2020    GLUCOSEU Negative 10/01/2018    KETUA NEGATIVE 05/29/2020     ABG:    Lab Results   Component Value Date    THO1JPC 39.0 11/08/2018    PO2ART 62 11/08/2018    OLL1GHD 27.7 11/08/2018     HgBA1c:  No results found for: LABA1C  Microalbumen/Creatinine ratio:  No components found for: RUCREAT          Objective:   Vitals: BP (!) 135/93   Pulse 102   Temp 98.2 °F (36.8 °C) (Oral)   Resp 19   Ht 5' (1.524 m)   Wt 104 lb 12.8 oz (47.5 kg)   SpO2 98%   BMI 20.47 kg/m²   General appearance: awake weak  HEENT: Head: Normal, normocephalic, atraumatic.   Neck: supple, symmetrical, trachea midline  Lungs: diminished breath sounds bilaterally  Heart: S1, S2 normal  Abdomen: abnormal findings:  soft nt  Extremities: edema   Neurologic: Mental status: alertness: alert      Patient Active Problem List:     Proteinuria     HTN (hypertension)     OAB (overactive bladder)     Hyperlipidemia     PAD (peripheral artery disease) (HCC)     Chronic kidney disease (CKD) stage G2/A3, mildly decreased glomerular filtration rate (GFR) between 60-89 mL/min/1.73 square meter and albuminuria creatinine ratio greater than 300 mg/g     Chronic kidney disease (CKD) stage G1/A3, glomerular

## 2020-05-31 NOTE — PROGRESS NOTES
Pulmonary and Critical Care  Progress Note    Subjective: The patient is slowly improving. Shortness of breath has improved. Chest pain none. Addressing respiratory complaints Patient is negative for  hemoptysis and cyanosis  CONSTITUTIONAL:  negative for fevers and chills      Past Medical History:     has a past medical history of Acute renal failure ([address] Acute respiratory failure ([address] Anemia, Anemia, unspecified, Brain aneurysm, CAD (coronary artery disease), Carotid stenosis, bilateral, Cerebral artery occlusion with cerebral infarction ([address] CHF (congestive heart failure) ([address] Chronic diarrhea, Chronic kidney disease, COPD (chronic obstructive pulmonary disease) ([address] Flash pulmonary edema (HCC), Full dentures, Gastroesophageal reflux disease without esophagitis, Hiatal hernia, History of blood transfusion, Hyperlipidemia, Hypertension, Irritable bowel disease, Left carotid bruit, Localized edema, Low back pain, Nicotine dependence, other tobacco product, uncomplicated, On home oxygen therapy, Osteoporosis, PONV (postoperative nausea and vomiting), Proteinuria, unspecified, PVD (peripheral vascular disease) ([address] Severe mitral regurgitation, Solitary cyst of left breast, Subclavian steal syndrome, TIA (transient ischemic attack), Unspecified asthma, uncomplicated, Unspecified osteoarthritis, unspecified site, Unspecified urinary incontinence, VHD (valvular heart disease), Wears glasses, and Wears glasses. has a past surgical history that includes Cardiac catheterization; Cholecystectomy; brain surgery; Brain aneurysm surgery (2005); Kidney biopsy (11/05/2018); Aorto-femoral Bypass Graft (Left, 1970's); Upper gastrointestinal endoscopy (N/A, 3/9/2019); Colonoscopy (N/A, 3/10/2019); and Colonoscopy (N/A, 4/17/2019). reports that she quit smoking about 18 months ago. Her smoking use included cigarettes. She has a 65.00 pack-year smoking history.  She has never used smokeless tobacco. She reports

## 2020-05-31 NOTE — PROGRESS NOTES
Per Dr. Kellen Garcia, hold evening dose of corlanor. Pharmacy can not verify the cardizem gtt until the Corlanor is discontinued. Corlanor D/C'd, Dr. Kellen Garcia aware.

## 2020-06-01 ENCOUNTER — TELEPHONE (OUTPATIENT)
Dept: INTERNAL MEDICINE CLINIC | Age: 85
End: 2020-06-01

## 2020-06-01 VITALS
BODY MASS INDEX: 20.83 KG/M2 | HEART RATE: 68 BPM | WEIGHT: 106.1 LBS | HEIGHT: 60 IN | DIASTOLIC BLOOD PRESSURE: 45 MMHG | OXYGEN SATURATION: 90 % | RESPIRATION RATE: 15 BRPM | SYSTOLIC BLOOD PRESSURE: 105 MMHG | TEMPERATURE: 97.9 F

## 2020-06-01 LAB
ALBUMIN SERPL-MCNC: 3.2 GM/DL (ref 3.4–5)
ANION GAP SERPL CALCULATED.3IONS-SCNC: 16 MMOL/L (ref 4–16)
BUN BLDV-MCNC: 85 MG/DL (ref 6–23)
CALCIUM SERPL-MCNC: 9 MG/DL (ref 8.3–10.6)
CHLORIDE BLD-SCNC: 93 MMOL/L (ref 99–110)
CO2: 33 MMOL/L (ref 21–32)
CREAT SERPL-MCNC: 2.4 MG/DL (ref 0.6–1.1)
GFR AFRICAN AMERICAN: 23 ML/MIN/1.73M2
GFR NON-AFRICAN AMERICAN: 19 ML/MIN/1.73M2
GLUCOSE BLD-MCNC: 92 MG/DL (ref 70–99)
HCT VFR BLD CALC: 25.2 % (ref 37–47)
HEMOGLOBIN: 7.7 GM/DL (ref 12.5–16)
MAGNESIUM: 2.2 MG/DL (ref 1.8–2.4)
MCH RBC QN AUTO: 30.4 PG (ref 27–31)
MCHC RBC AUTO-ENTMCNC: 30.6 % (ref 32–36)
MCV RBC AUTO: 99.6 FL (ref 78–100)
PDW BLD-RTO: 14 % (ref 11.7–14.9)
PHOSPHORUS: 5 MG/DL (ref 2.5–4.9)
PLATELET # BLD: 324 K/CU MM (ref 140–440)
PMV BLD AUTO: 10.5 FL (ref 7.5–11.1)
POTASSIUM SERPL-SCNC: 3.4 MMOL/L (ref 3.5–5.1)
RBC # BLD: 2.53 M/CU MM (ref 4.2–5.4)
SODIUM BLD-SCNC: 142 MMOL/L (ref 135–145)
WBC # BLD: 6.4 K/CU MM (ref 4–10.5)

## 2020-06-01 PROCEDURE — 36415 COLL VENOUS BLD VENIPUNCTURE: CPT

## 2020-06-01 PROCEDURE — 94761 N-INVAS EAR/PLS OXIMETRY MLT: CPT

## 2020-06-01 PROCEDURE — 85027 COMPLETE CBC AUTOMATED: CPT

## 2020-06-01 PROCEDURE — 80069 RENAL FUNCTION PANEL: CPT

## 2020-06-01 PROCEDURE — 83735 ASSAY OF MAGNESIUM: CPT

## 2020-06-01 PROCEDURE — 94640 AIRWAY INHALATION TREATMENT: CPT

## 2020-06-01 PROCEDURE — 6370000000 HC RX 637 (ALT 250 FOR IP): Performed by: INTERNAL MEDICINE

## 2020-06-01 PROCEDURE — 2700000000 HC OXYGEN THERAPY PER DAY

## 2020-06-01 PROCEDURE — 2580000003 HC RX 258: Performed by: INTERNAL MEDICINE

## 2020-06-01 RX ORDER — PANTOPRAZOLE SODIUM 40 MG/1
40 TABLET, DELAYED RELEASE ORAL
Qty: 30 TABLET | Refills: 3 | Status: SHIPPED | OUTPATIENT
Start: 2020-06-02

## 2020-06-01 RX ORDER — POTASSIUM CHLORIDE 20 MEQ/1
40 TABLET, EXTENDED RELEASE ORAL ONCE
Status: COMPLETED | OUTPATIENT
Start: 2020-06-01 | End: 2020-06-01

## 2020-06-01 RX ORDER — POTASSIUM CHLORIDE 20 MEQ/1
20 TABLET, EXTENDED RELEASE ORAL 2 TIMES DAILY
Qty: 6 TABLET | Refills: 0 | Status: SHIPPED | OUTPATIENT
Start: 2020-06-01 | End: 2020-06-30 | Stop reason: SDUPTHER

## 2020-06-01 RX ORDER — TORSEMIDE 20 MG/1
100 TABLET ORAL 2 TIMES DAILY
Qty: 60 TABLET | Refills: 0 | Status: SHIPPED | OUTPATIENT
Start: 2020-06-02

## 2020-06-01 RX ORDER — DILTIAZEM HYDROCHLORIDE 60 MG/1
60 TABLET, FILM COATED ORAL EVERY 8 HOURS SCHEDULED
Qty: 120 TABLET | Refills: 3 | Status: SHIPPED | OUTPATIENT
Start: 2020-06-01 | End: 2020-06-16

## 2020-06-01 RX ORDER — DILTIAZEM HYDROCHLORIDE 60 MG/1
60 TABLET, FILM COATED ORAL EVERY 8 HOURS SCHEDULED
Status: DISCONTINUED | OUTPATIENT
Start: 2020-06-01 | End: 2020-06-01 | Stop reason: HOSPADM

## 2020-06-01 RX ORDER — MIDODRINE HYDROCHLORIDE 5 MG/1
5 TABLET ORAL
Qty: 90 TABLET | Refills: 3 | Status: SHIPPED | OUTPATIENT
Start: 2020-06-01

## 2020-06-01 RX ORDER — METOLAZONE 2.5 MG/1
2.5 TABLET ORAL DAILY
Qty: 30 TABLET | Refills: 0 | Status: SHIPPED | OUTPATIENT
Start: 2020-06-02

## 2020-06-01 RX ORDER — SPIRONOLACTONE 25 MG/1
25 TABLET ORAL DAILY
Qty: 30 TABLET | Refills: 0 | Status: SHIPPED | OUTPATIENT
Start: 2020-06-01

## 2020-06-01 RX ADMIN — GABAPENTIN 100 MG: 100 CAPSULE ORAL at 09:15

## 2020-06-01 RX ADMIN — APIXABAN 2.5 MG: 2.5 TABLET, FILM COATED ORAL at 09:14

## 2020-06-01 RX ADMIN — DILTIAZEM HYDROCHLORIDE 60 MG: 60 TABLET, FILM COATED ORAL at 06:13

## 2020-06-01 RX ADMIN — MIDODRINE HYDROCHLORIDE 5 MG: 5 TABLET ORAL at 09:14

## 2020-06-01 RX ADMIN — ALBUTEROL SULFATE 2 PUFF: 90 AEROSOL, METERED RESPIRATORY (INHALATION) at 07:45

## 2020-06-01 RX ADMIN — IPRATROPIUM BROMIDE 2 PUFF: 17 AEROSOL, METERED RESPIRATORY (INHALATION) at 07:44

## 2020-06-01 RX ADMIN — PRAVASTATIN SODIUM 40 MG: 40 TABLET ORAL at 09:16

## 2020-06-01 RX ADMIN — SODIUM CHLORIDE, PRESERVATIVE FREE 10 ML: 5 INJECTION INTRAVENOUS at 09:23

## 2020-06-01 RX ADMIN — CLOPIDOGREL BISULFATE 75 MG: 75 TABLET ORAL at 09:17

## 2020-06-01 RX ADMIN — PANTOPRAZOLE SODIUM 40 MG: 40 TABLET, DELAYED RELEASE ORAL at 06:12

## 2020-06-01 RX ADMIN — DILTIAZEM HYDROCHLORIDE 60 MG: 60 TABLET, FILM COATED ORAL at 00:24

## 2020-06-01 RX ADMIN — POTASSIUM CHLORIDE 40 MEQ: 1500 TABLET, EXTENDED RELEASE ORAL at 09:17

## 2020-06-01 NOTE — PROGRESS NOTES
CLINICAL PHARMACY NOTE: MEDS TO [address] Select Patient?: No  Total # of Prescriptions Filled: 7   The following medications were delivered to the patient:  · Eliquis 2.5mg  · Spironolactone 25mg  · Potassium chloride 20meq  · Pantoprazole 40mg  · Diltiazem 60mg  · Metolazone 2.5mg  · Midodrine 5mg  Total # of Interventions Completed: 2  Time Spent (min): 60    Additional Documentation:  We used a 1 time  coupon to pay for her eliquis today. Her insurance would not cover torsemide 20mg today because she had it filled at the end of May and should have some at home.

## 2020-06-01 NOTE — CARE COORDINATION
Pt plans home. Doctor has orders HC. LSW gave pt a list of HC. Pt requested [facility].   Referral PS to [facility] with [facility]

## 2020-06-01 NOTE — FLOWSHEET NOTE
06/01/20 1015   Mobility   Activity Ambulate in isidro   Level of Assistance Independent after set-up   Assistive Device Front wheel walker   Distance Ambulated (ft) 200 ft   Ambulation Response Tolerated well

## 2020-06-01 NOTE — PROGRESS NOTES
Pulmonary and Critical Care  Progress Note    Subjective: The patient has improved. Shortness of breath has improved. Chest pain none. Addressing respiratory complaints Patient is negative for  hemoptysis and cyanosis  CONSTITUTIONAL:  negative for fevers and chills      Past Medical History:     has a past medical history of Acute renal failure ([address] Acute respiratory failure ([address] Anemia, Anemia, unspecified, Brain aneurysm, CAD (coronary artery disease), Carotid stenosis, bilateral, Cerebral artery occlusion with cerebral infarction ([address] CHF (congestive heart failure) ([address] Chronic diarrhea, Chronic kidney disease, COPD (chronic obstructive pulmonary disease) ([address] Flash pulmonary edema (HCC), Full dentures, Gastroesophageal reflux disease without esophagitis, Hiatal hernia, History of blood transfusion, Hyperlipidemia, Hypertension, Irritable bowel disease, Left carotid bruit, Localized edema, Low back pain, Nicotine dependence, other tobacco product, uncomplicated, On home oxygen therapy, Osteoporosis, PONV (postoperative nausea and vomiting), Proteinuria, unspecified, PVD (peripheral vascular disease) ([address] Severe mitral regurgitation, Solitary cyst of left breast, Subclavian steal syndrome, TIA (transient ischemic attack), Unspecified asthma, uncomplicated, Unspecified osteoarthritis, unspecified site, Unspecified urinary incontinence, VHD (valvular heart disease), Wears glasses, and Wears glasses. has a past surgical history that includes Cardiac catheterization; Cholecystectomy; brain surgery; Brain aneurysm surgery (2005); Kidney biopsy (11/05/2018); Aorto-femoral Bypass Graft (Left, 1970's); Upper gastrointestinal endoscopy (N/A, 3/9/2019); Colonoscopy (N/A, 3/10/2019); and Colonoscopy (N/A, 4/17/2019). reports that she quit smoking about 18 months ago. Her smoking use included cigarettes. She has a 65.00 pack-year smoking history.  She has never used smokeless tobacco. She reports that she filtration rate (GFR) between 60-89 mL/min/1.73 square meter and albuminuria creatinine ratio greater than 300 mg/g    Chronic kidney disease (CKD) stage G1/A3, glomerular filtration rate (GFR) equal to or greater than 90 mL/min/1.73 square meter and albuminuria creatinine ratio greater than 300 mg/g    Flash pulmonary edema (HCC)    Acute respiratory failure with hypoxia (HCC)    Valvular disease    Chronic kidney disease (CKD) stage G3b/A3, moderately decreased glomerular filtration rate (GFR) between 30-44 mL/min/1.73 square meter and albuminuria creatinine ratio greater than 300 mg/g (HCC)    CASS (acute [facility]) [facility]    Iron deficiency anemia    Gastrointestinal hemorrhage associated with anorectal source    Acute on chronic respiratory failure with hypoxia and hypercapnia (HCC)    Pneumonia due to organism    Heart failure ([facility]       Plan:   1. Overall the patient has improved. 2. Inc. Activity.     Shila Taylor MD  6/1/2020  11:15 AM

## 2020-06-01 NOTE — PROGRESS NOTES
Daily Progress Note     awake alert feeling better  Wants to go home  Heart rate afib rate is stable --keep  On cardizem, spoke with patient --she is agreeable to stay on  Change to PO diuretics held for now as BUN elevated -resume in few days  afib rate control and AC --low dose due to renal issues  Valvular heart dx not a candidate for surgery -high risk optimize medical treatment   HFpEF due to valvular heart dx   Moderate MS and moderate MR- she does not want surgery and she would be a high surgical candidate   Hx of CAD  Hx of PAD  Renal insuffiencey   Suggest palliative care also    Will start on low dose AC for afib   Overall prognosis is guarded   increase activity   Multifactorial causes for her dyspnea, COPD and heart dx  She is not very active at home      Echo ==Summary   Left ventricular function is normal, EF is estimated at 55-60%.   Moderate left ventricular hypertrophy.   No regional wall motion abnormalities were detected.  G[place] Comes dilated left atrium.   Sclerotic, but non-stenotic aortic valve.   Severe Mitral annular calcification is present.   Moderate mitral stenosis with a mean gradient of 7 mmHg.   Moderate to Severe mitral regurgitation is present.   Mild to moderate tricuspid regurgitation.    RVSP is 4o mmHg.   Mild pulmonary hypertension noted   No evidence of pericardial effusion.     The patient had a DARLYN done back in 11/2018. [facility] DARLYN at that time had  shown that LV function was preserved.  Aortic valve was heavily  calcified with some aortic stenosis noted.  Mitral valve was heavily  calcified with moderate mitral stenosis and moderate-to-severe mitral  regurgitation noted.  Moderate tricuspid regurgitation was also present.     PAST MEDICAL HISTORY:  The patient has a significant history of valvular  heart disease present including aortic valve, mitral valve, and  tricuspid valve.  She is known to have coronary artery disease.  She had  an angioplasty done in the past.  She also has peripheral vascular  disease.  She had a cutting balloon angioplasty of the right common  femoral artery done and right SFA.  She also had aortofemoral bypass  surgery done in 1989 by Dr. Nils Jennings of COPD, renal  insufficiency, coronary artery disease, history of strokes, valvular  heart disease, GERD, and hypertension present.       Objective:   BP (!) 129/48   Pulse 73   Temp 98.1 °F (36.7 °C) (Oral)   Resp 17   Ht 5' (1.524 m)   Wt 106 lb 1.6 oz (48.1 kg)   SpO2 98%   BMI 20.72 kg/m²       Intake/Output Summary (Last 24 hours) at 6/1/2020 0810  Last data filed at 5/31/2020 4931  Gross per 24 hour   Intake 240 ml   Output --   Net 240 ml       Medications:   Scheduled Meds:   dilTIAZem  60 mg Oral 3 times per day    apixaban  2.5 mg Oral BID    iron sucrose  200 mg Intravenous Q24H    midodrine  5 mg Oral TID WC    clopidogrel  75 mg Oral Daily    pantoprazole  40 mg Oral QAM AC    gabapentin  100 mg Oral BID    pravastatin  40 mg Oral Daily    sodium chloride flush  10 mL Intravenous 2 times per day    albuterol sulfate HFA  2 puff Inhalation 4x daily    ipratropium  2 puff Inhalation 4x daily      Infusions:     PRN Meds:  sodium chloride flush, polyethylene glycol, promethazine **OR** ondansetron, acetaminophen **OR** acetaminophen, albuterol sulfate HFA       Physical Exam:  Vitals:    06/01/20 0746   BP:    Pulse:    Resp:    Temp:    SpO2: 98%        General: awake alert   Chest: Nontender  Cardiac: afib   Lungs:Clear to auscultation and percussion. Abdomen: Soft, NT, ND, +BS  Extremities:  No edema   Vascular:  Equal 2+ peripheral pulses.         Lab Data:  CBC:   Recent Labs     05/30/20  0430   WBC 10.3   HGB 7.9*   HCT 25.0*   MCV 98.0        BMP:   Recent Labs     05/30/20  0430 05/31/20  1519 06/01/20  0405    136 142   K 3.7 3.4* 3.4*   CL 98* 91* 93*   CO2 26 31 33*   PHOS 3.6 4.7 5.0*   BUN 77* 85* 85*   CREATININE 2.4* 2.5* 2.4*     LIVER PROFILE:   Recent

## 2020-06-02 ENCOUNTER — TELEPHONE (OUTPATIENT)
Dept: INTERNAL MEDICINE CLINIC | Age: 85
End: 2020-06-02

## 2020-06-02 ENCOUNTER — CARE COORDINATION (OUTPATIENT)
Dept: CASE MANAGEMENT | Age: 85
End: 2020-06-02

## 2020-06-02 NOTE — CARE COORDINATION
[facility] Transitions Initial Follow Up Call    Call within 2 business days of discharge: Yes    Patient: Savanah Boyer Patient : 1935   MRN: [id number]  Reason for Admission:   Fever, SOB  Discharge Date: 20 RARS: Readmission Risk Score: 21      Last Discharge [address] [place] [address]       Complaint Diagnosis Description Type Department Provider    20 Fever; Shortness of Breath Shortness of breath . .. ED to Hosp-Admission (Discharged) (ADMITTED) Donny Eldridge MD; Adrien Wilder... Spoke with:   patient    Facility:   [facility]    Non-face-to-face services provided:  Education of patient/family/caregiver/guardian to support self-management-1    Care Transitions 24 Hour Call    Do you have all of your prescriptions and are they filled?:  Yes  Care Transitions Interventions         Follow Up:  COVID-19 Risk Monitoring:    Attempted patient contact. No answer to phone. Left VM with CTN contact information and request for call back. Spoke with patient's Emergency contact. Reports that patient has been having phone issues ; but is agreeable to forward CTN contact information with request for call back. Reports that patient does not have a Provider follow up appointment and is agreeable to CTN support to schedule. Patient contact also reports that she is not sure if patient is scheduled for C. Informed of CTN plan to follow up with requests. T.C. to PCP office. Provider has no available appts. Recommended patient follow up at the Walk in Clinic,    [place]. to the Walk in Clinic. Seeing patients by appt. Only. 2 weeks out. Appt. Scheduled for  at 10:30. Spoke with [address] Confirmed services and plan for [facility] follow up visit today. Return call received from patient. See below. Patient contacted regarding recent discharge and COVID-19 risk. Discussed COVID-19 related testing which was available at this time. Test results were negative.  Patient informed

## 2020-06-08 ENCOUNTER — HOSPITAL ENCOUNTER (OUTPATIENT)
Age: 85
Discharge: HOME | End: 2020-06-08
Payer: MEDICARE

## 2020-06-08 LAB
ANION GAP SERPL CALCULATED.3IONS-SCNC: 18 MMOL/L (ref 4–16)
BUN BLDV-MCNC: 68 MG/DL (ref 6–23)
CALCIUM SERPL-MCNC: 9.6 MG/DL (ref 8.3–10.6)
CHLORIDE BLD-SCNC: 89 MMOL/L (ref 99–110)
CO2: 28 MMOL/L (ref 21–32)
CREAT SERPL-MCNC: 2.9 MG/DL (ref 0.6–1.1)
GFR AFRICAN AMERICAN: 19 ML/MIN/1.73M2
GFR NON-AFRICAN AMERICAN: 16 ML/MIN/1.73M2
GLUCOSE BLD-MCNC: 102 MG/DL (ref 70–99)
MAGNESIUM: 1.9 MG/DL (ref 1.8–2.4)
POTASSIUM SERPL-SCNC: 4.2 MMOL/L (ref 3.5–5.1)
SODIUM BLD-SCNC: 135 MMOL/L (ref 135–145)

## 2020-06-08 PROCEDURE — 80048 BASIC METABOLIC PNL TOTAL CA: CPT

## 2020-06-08 PROCEDURE — 83735 ASSAY OF MAGNESIUM: CPT

## 2020-06-08 PROCEDURE — 36415 COLL VENOUS BLD VENIPUNCTURE: CPT

## 2020-06-12 ENCOUNTER — CARE COORDINATION (OUTPATIENT)
Dept: CASE MANAGEMENT | Age: 85
End: 2020-06-12

## 2020-06-12 NOTE — CARE COORDINATION
[facility] 45 Transitions Follow Up Call    2020    Patient: Annitta Osgood  Patient : 1935   MRN: [id number]  Reason for Admission: Ac CHF, Ac Resp Failure  Discharge Date: 20 RARS: Readmission Risk Score: 21    Care Transitions Subsequent and Final Call    Subsequent and Final Calls  Are you currently active with any services?:  Home Health  Care Transitions Interventions  Other Interventions:          [address]: 2020 Not detected  PATIENT RISK FACTORS: CHF, COPD    Patient contacted regarding COVID-19 risk and screening. Care Transition Nurse spoke briefly w/ Patient by telephone to perform follow-up assessment. Verified name and  as identifiers. Patient denies sob, cough, fever, malaise or other COVID19 related symptoms. Reports COPD/CHF at baseline; no sx of exac. [address] [place] [address] Rd RN currently in home for visit. Instructed Patient to contact [address] [place] [address] Rd  re: any healthcare needs. Reviewed the following, verbalizes understanding:     From CDC: Are you at higher risk for severe illness?  Wash your hands often.  Avoid close contact (6 feet, which is about two arm lengths) with people who are sick.  Put distance between yourself and other people if COVID-19 is spreading in your community.  Clean and disinfect frequently touched surfaces.  Avoid all cruise travel and non-essential air travel.  Call your healthcare professional if you have concerns about COVID-19 and your underlying condition or if you are sick. For more information on steps you can take to protect yourself, see CDC's How to [id number] [place] [address] for follow up call in 5-7 days based on severity of symptoms and risk factors.     Follow Up  Future Appointments   Date Time Provider Lyn Escobedo   2020 10:30 AM AKIL Marie - CNP [address] [facility] WALK IN [facility]   2020 10:15 AM Toni Contreras MD [address] [facility] PULM [place]   2020  1:30 PM Silvia Lester MD [id number]

## 2020-06-16 ENCOUNTER — OFFICE VISIT (OUTPATIENT)
Dept: FAMILY MEDICINE CLINIC | Age: 85
End: 2020-06-16
Payer: MEDICARE

## 2020-06-16 VITALS
TEMPERATURE: 97.3 F | WEIGHT: 96 LBS | OXYGEN SATURATION: 95 % | HEART RATE: 117 BPM | DIASTOLIC BLOOD PRESSURE: 64 MMHG | SYSTOLIC BLOOD PRESSURE: 110 MMHG | HEIGHT: 60 IN | BODY MASS INDEX: 18.85 KG/M2

## 2020-06-16 PROCEDURE — 1123F ACP DISCUSS/DSCN MKR DOCD: CPT | Performed by: NURSE PRACTITIONER

## 2020-06-16 PROCEDURE — 1111F DSCHRG MED/CURRENT MED MERGE: CPT | Performed by: NURSE PRACTITIONER

## 2020-06-16 PROCEDURE — 99214 OFFICE O/P EST MOD 30 MIN: CPT | Performed by: NURSE PRACTITIONER

## 2020-06-16 PROCEDURE — G8427 DOCREV CUR MEDS BY ELIG CLIN: HCPCS | Performed by: NURSE PRACTITIONER

## 2020-06-16 PROCEDURE — 4040F PNEUMOC VAC/ADMIN/RCVD: CPT | Performed by: NURSE PRACTITIONER

## 2020-06-16 PROCEDURE — G8399 PT W/DXA RESULTS DOCUMENT: HCPCS | Performed by: NURSE PRACTITIONER

## 2020-06-16 PROCEDURE — 1090F PRES/ABSN URINE INCON ASSESS: CPT | Performed by: NURSE PRACTITIONER

## 2020-06-16 PROCEDURE — G8420 CALC BMI NORM PARAMETERS: HCPCS | Performed by: NURSE PRACTITIONER

## 2020-06-16 PROCEDURE — 1036F TOBACCO NON-USER: CPT | Performed by: NURSE PRACTITIONER

## 2020-06-16 ASSESSMENT — ENCOUNTER SYMPTOMS
NAUSEA: 0
WHEEZING: 0
CHEST TIGHTNESS: 0
DIARRHEA: 0
COUGH: 0
VOMITING: 0
SHORTNESS OF BREATH: 0

## 2020-06-19 ENCOUNTER — CARE COORDINATION (OUTPATIENT)
Dept: CASE MANAGEMENT | Age: 85
End: 2020-06-19

## 2020-06-19 ENCOUNTER — OFFICE VISIT (OUTPATIENT)
Dept: PULMONOLOGY | Age: 85
End: 2020-06-19
Payer: MEDICARE

## 2020-06-19 VITALS
HEIGHT: 60 IN | OXYGEN SATURATION: 95 % | SYSTOLIC BLOOD PRESSURE: 128 MMHG | TEMPERATURE: 97 F | DIASTOLIC BLOOD PRESSURE: 60 MMHG | HEART RATE: 115 BPM | BODY MASS INDEX: 19.36 KG/M2 | WEIGHT: 98.6 LBS

## 2020-06-19 PROBLEM — J44.9 COPD (CHRONIC OBSTRUCTIVE PULMONARY DISEASE) (HCC): Status: ACTIVE | Noted: 2020-06-19

## 2020-06-19 PROBLEM — Z87.891 EX-SMOKER: Status: ACTIVE | Noted: 2020-06-19

## 2020-06-19 PROCEDURE — G8420 CALC BMI NORM PARAMETERS: HCPCS | Performed by: INTERNAL MEDICINE

## 2020-06-19 PROCEDURE — 3023F SPIROM DOC REV: CPT | Performed by: INTERNAL MEDICINE

## 2020-06-19 PROCEDURE — G8427 DOCREV CUR MEDS BY ELIG CLIN: HCPCS | Performed by: INTERNAL MEDICINE

## 2020-06-19 PROCEDURE — 99213 OFFICE O/P EST LOW 20 MIN: CPT | Performed by: INTERNAL MEDICINE

## 2020-06-19 PROCEDURE — 1090F PRES/ABSN URINE INCON ASSESS: CPT | Performed by: INTERNAL MEDICINE

## 2020-06-19 PROCEDURE — G8926 SPIRO NO PERF OR DOC: HCPCS | Performed by: INTERNAL MEDICINE

## 2020-06-19 ASSESSMENT — ENCOUNTER SYMPTOMS
EYE ITCHING: 0
ABDOMINAL PAIN: 0
EYE DISCHARGE: 0
ABDOMINAL DISTENTION: 0
COUGH: 0
SHORTNESS OF BREATH: 0
BACK PAIN: 0

## 2020-06-19 NOTE — CARE COORDINATION
[facility] 45 Transitions Follow Up Call    2020    Patient: Zak Adrian  Patient : 1935   MRN: [id number]  Reason for Admission:  Ac CHF, Ac Resp Failure  Discharge Date: 20 RARS: Readmission Risk Score: 21    Care Transitions Subsequent and Final Call    Schedule Follow Up Appointment with PCP:  Declined  Subsequent and Final Calls  Do you have any ongoing symptoms?:  No  Have your medications changed?:  No  Do you have any questions related to your medications?:  No  Do you currently have any active services?:  Yes  Are you currently active with any services?:  Home Health  Do you have any needs or concerns that I can assist you with?:  No  Identified Barriers: Other  Care Transitions Interventions   Home Care Waiver:  Completed        DME Assistance:  Declined   Other Interventions:             COVID19 RISK MONITORING  COVID19 SCREEN: 2020 Not detected  PATIENT RISK FACTORS: CHF, COPD    Spoke w/ Patient for follow up call. Reports doing well. Denies sob, cp, edema, orthopnea, wheezing, wt gain. Reports CHF/COPD at baseline. Reviewed COPD/CHF Zone Mgt education, verbalizes understanding. Denies any COVID19 related sx. Since discharge has been seen by PCP and Pulmonologist with no new rx. Reports taking all meds as directed and denies rx/dme needs. Advised to obtain 90 day supply of routine and prn meds. Active w/ [address] and denies need for additional in home support. Instructed to contact [facility]/PCP  re: any health concerns, has contact numbers. Discussed benefits of Telehealth, [facility], Urgent Care, Flu Clinic. Reviewed the following :    From CDC: Are you at higher risk for severe illness?  Wash your hands often.  Avoid close contact (6 feet, which is about two arm lengths) with people who are sick.  Put distance between yourself and other people if COVID-19 is spreading in your community.  Clean and disinfect frequently touched surfaces.    Avoid all cruise travel and

## 2020-06-19 NOTE — PROGRESS NOTES
activity: None   Lifestyle    Physical activity     Days per week: None     Minutes per session: None    Stress: None   Relationships    Social connections     Talks on phone: None     Gets together: None     Attends Confucianism service: None     Active member of club or organization: None     Attends meetings of clubs or organizations: None     Relationship status: None    Intimate partner violence     Fear of current or ex partner: None     Emotionally abused: None     Physically abused: None     Forced sexual activity: None   Other Topics Concern    None   Social History Narrative    None       Review of Systems   Constitutional: Negative for fatigue. HENT: Negative for congestion and postnasal drip. Eyes: Negative for discharge and itching. Respiratory: Negative for cough and shortness of breath. Cardiovascular: Negative for chest pain. Gastrointestinal: Negative for abdominal distention and abdominal pain. Endocrine: Negative for cold intolerance and heat intolerance. Genitourinary: Negative for enuresis and frequency. Musculoskeletal: Negative for arthralgias and back pain. Allergic/Immunologic: Negative for environmental allergies and food allergies. Neurological: Negative for light-headedness and headaches. Hematological: Negative for adenopathy. Psychiatric/Behavioral: Negative for agitation and behavioral problems. Objective:   /60   Pulse 115   Temp 97 °F (36.1 °C) (Infrared)   Ht 5' (1.524 m)   Wt 98 lb 9.6 oz (44.7 kg)   SpO2 95%   BMI 19.26 kg/m²   Body mass index is 19.26 kg/m². No flowsheet data found. {MALLAMPATI:3    Physical Exam  Vitals signs reviewed. Constitutional:       Appearance: Normal appearance. HENT:      Head: Normocephalic and atraumatic. Nose: Nose normal.      Mouth/Throat:      Mouth: Mucous membranes are moist.   Eyes:      Extraocular Movements: Extraocular movements intact.       Pupils: Pupils are equal, round, and reactive to light. Neck:      Musculoskeletal: Normal range of motion and neck supple. Cardiovascular:      Rate and Rhythm: Normal rate and regular rhythm. Pulses: Normal pulses. Heart sounds: Normal heart sounds. Pulmonary:      Effort: Pulmonary effort is normal.      Breath sounds: Normal breath sounds. Abdominal:      General: Abdomen is flat. Palpations: Abdomen is soft. Musculoskeletal: Normal range of motion. Skin:     General: Skin is warm and dry. Neurological:      General: No focal deficit present. Mental Status: She is alert and oriented to person, place, and time. Psychiatric:         Mood and Affect: Mood normal.         Behavior: Behavior normal.         Radiology: None    Assessment and Plan     Problem List        Pulmonary Problems    COPD (chronic obstructive pulmonary disease) ([facility])     Advised to c/w quitting smoking  PFT  Get yearly flu vaccine         Relevant Medications    albuterol sulfate HFA (PROAIR HFA) 108 (90 Base) MCG/ACT inhaler    benzonatate (TESSALON PERLES) 100 MG capsule    Other Relevant Orders    Full PFT Study With Bronchodilator       Other    Ex-smoker     Advised to c/w quitting smoking         Relevant Orders    Full PFT Study With Bronchodilator               Return in about 4 weeks (around 7/17/2020) for PFT.      Progress notes sent to the referring Provider    Chavez Mansfield MD  6/19/2020  10:45 AM

## 2020-06-29 ENCOUNTER — CARE COORDINATION (OUTPATIENT)
Dept: CASE MANAGEMENT | Age: 85
End: 2020-06-29

## 2020-06-29 NOTE — CARE COORDINATION
[facility] Transitions Follow Up Call    2020    Patient: Stone Bunn  Patient : 1935  MRN: [id number]  Reason for Admission: Ac CHF, Ac Resp Failure  Discharge Date: 20 RARS: Readmission Risk Score: 800 [address] Not detected  PATIENT RISK FACTORS: CHF, COPD     Attempt to reach for follow up unsuccessful; message left requesting call back.    Preston Bianchi RN

## 2020-07-09 ENCOUNTER — HOSPITAL ENCOUNTER (OUTPATIENT)
Dept: WOMENS IMAGING | Age: 85
Discharge: HOME | End: 2020-07-09
Payer: MEDICARE

## 2020-07-09 PROCEDURE — 77080 DXA BONE DENSITY AXIAL: CPT

## 2020-07-09 PROCEDURE — 77063 BREAST TOMOSYNTHESIS BI: CPT

## 2020-07-13 ENCOUNTER — HOSPITAL ENCOUNTER (OUTPATIENT)
Age: 85
Discharge: HOME | End: 2020-07-13
Payer: MEDICARE

## 2020-07-13 PROCEDURE — U0002 COVID-19 LAB TEST NON-CDC: HCPCS

## 2020-07-13 PROCEDURE — 36415 COLL VENOUS BLD VENIPUNCTURE: CPT

## 2020-07-16 LAB
SARS-COV-2: NOT DETECTED
SOURCE: NORMAL

## 2020-07-20 ENCOUNTER — HOSPITAL ENCOUNTER (OUTPATIENT)
Dept: CARDIAC REHAB | Age: 85
Discharge: HOME | End: 2020-07-20
Payer: MEDICARE

## 2020-07-20 PROCEDURE — 94060 EVALUATION OF WHEEZING: CPT

## 2020-07-20 PROCEDURE — 94727 GAS DIL/WSHOT DETER LNG VOL: CPT

## 2020-07-20 PROCEDURE — 94729 DIFFUSING CAPACITY: CPT

## 2020-08-10 ENCOUNTER — TELEPHONE (OUTPATIENT)
Dept: INTERNAL MEDICINE CLINIC | Age: 85
End: 2020-08-10

## 2020-08-10 NOTE — TELEPHONE ENCOUNTER
Pt states she has a uti, did a test strip at home and it came back positive, she does have an appt this coming wed but would like to have an antibiotic called in now to get started     Please call daughter and confirm

## 2020-08-12 ENCOUNTER — OFFICE VISIT (OUTPATIENT)
Dept: INTERNAL MEDICINE CLINIC | Age: 85
End: 2020-08-12
Payer: MEDICARE

## 2020-08-12 VITALS
TEMPERATURE: 98.2 F | HEART RATE: 123 BPM | BODY MASS INDEX: 19.44 KG/M2 | DIASTOLIC BLOOD PRESSURE: 60 MMHG | OXYGEN SATURATION: 95 % | WEIGHT: 92.6 LBS | HEIGHT: 58 IN | SYSTOLIC BLOOD PRESSURE: 106 MMHG

## 2020-08-12 PROCEDURE — 4040F PNEUMOC VAC/ADMIN/RCVD: CPT | Performed by: FAMILY MEDICINE

## 2020-08-12 PROCEDURE — G8427 DOCREV CUR MEDS BY ELIG CLIN: HCPCS | Performed by: FAMILY MEDICINE

## 2020-08-12 PROCEDURE — 1090F PRES/ABSN URINE INCON ASSESS: CPT | Performed by: FAMILY MEDICINE

## 2020-08-12 PROCEDURE — 99214 OFFICE O/P EST MOD 30 MIN: CPT | Performed by: FAMILY MEDICINE

## 2020-08-12 PROCEDURE — G8420 CALC BMI NORM PARAMETERS: HCPCS | Performed by: FAMILY MEDICINE

## 2020-08-12 PROCEDURE — 1123F ACP DISCUSS/DSCN MKR DOCD: CPT | Performed by: FAMILY MEDICINE

## 2020-08-12 PROCEDURE — G8399 PT W/DXA RESULTS DOCUMENT: HCPCS | Performed by: FAMILY MEDICINE

## 2020-08-12 PROCEDURE — 1036F TOBACCO NON-USER: CPT | Performed by: FAMILY MEDICINE

## 2020-08-12 RX ORDER — METOPROLOL TARTRATE 50 MG/1
50 TABLET, FILM COATED ORAL 2 TIMES DAILY
COMMUNITY

## 2020-08-12 RX ORDER — CEPHALEXIN 250 MG/1
250 CAPSULE ORAL 2 TIMES DAILY
Qty: 14 CAPSULE | Refills: 0 | Status: SHIPPED | OUTPATIENT
Start: 2020-08-12

## 2020-08-12 NOTE — PROGRESS NOTES
Leena Curiel  1935  82 y.o.  female    Chief Complaint   Patient presents with    6 Month Follow-Up         History of Present Illness  Leena Curiel is a 82 y.o. female who presents today for a check up. She states she was recently seen by cardiology and he increased her Metoprolol to 50 bid to lower her HR. She states her  HR was higher previously. She has Atrial fibrillation due to severe MR. Refused other  intervention.   - She c/o of some urinary frequency and dysuria. POCT UA- neg. She states she will notice some discomfort at the end of urination. No fever, pelvic pain, nausea. - Neuropathy- Stable on Gabapentin  - CKD 4- managed by nephrology      Review of Systems   Constitutional: Negative for diaphoresis and fever. HENT: Negative. Respiratory: Positive for shortness of breath (chronic). Negative for cough and chest tightness. Cardiovascular: Negative for chest pain. Gastrointestinal: Negative for abdominal pain and nausea. Genitourinary: Positive for dysuria and frequency. Musculoskeletal: Negative for back pain. Neurological: Negative for dizziness, light-headedness and headaches.        Allergies   Allergen Reactions    Lipitor [Atorvastatin]     Sulfa Antibiotics Hives    Zocor [Simvastatin]        Past Medical History:   Diagnosis Date    Acute renal failure ([address] 4/17/2017    Acute respiratory failure ([address] 11/2018    Anemia     per old chart Hgb 7.5 on 3/8/2019( had transfusion)    Anemia, unspecified     Atrial fibrillation, chronic     Brain aneurysm     NO MRI--[facility] coiling [facility] CAD (coronary artery disease)     Carotid stenosis, bilateral 11/2018    Severe    Cerebral artery occlusion with cerebral infarction [facility])     \"after cath, had slight stroke or TIA and affected my vision, had double vision lasted approx 24 hours and no trouble since then\"    CHF (congestive heart failure) ([facility]     \"get pulmonary edema from a bad heart valve and CHF\" follow with Dr Chema Greenberg Chronic diarrhea     Chronic kidney disease     \"follow with Dr Rosina Mac" \"creatine level goes up and down\"    COPD (chronic obstructive pulmonary disease) [facility]     follows with Dr Tashi Reynoso pulmonary edema ([facility] 11/2018    \"have hx of this so they have to watch the IV fluid\"    Full dentures     Gastroesophageal reflux disease without esophagitis     Hiatal hernia     History of blood transfusion     3/2019    Hyperlipidemia     Hypertension     Irritable bowel disease     \"better now\"    Left carotid bruit 11/21/2014    Localized edema     Low back pain     \"for yrs- wear a back brace when I do anything for support\"    Nicotine dependence, other tobacco product, uncomplicated     On home oxygen therapy     2l/nc just at night     Osteoporosis     PONV (postoperative nausea and vomiting)     hx of motion sickness    Proteinuria, unspecified     PVD (peripheral vascular disease) ([facility]     Severe mitral regurgitation 11/2018    Solitary cyst of left breast     Subclavian steal syndrome 11/21/2014    Right side    TIA (transient ischemic attack)     Unspecified asthma, uncomplicated     ( pt stated does not have asthma- with phone assess 4/16/2019)    Unspecified osteoarthritis, unspecified site     Unspecified urinary incontinence     VHD (valvular heart disease)     Wears glasses     Wears glasses        Past Surgical History:   Procedure Laterality Date    [facility]OR[facility]FEMORAL [facility]FT [facility]    BRAIN ANEURYSM SURGERY  2005    [facility]ellar- [facility]    BRAIN SURGERY      CARDIAC CATHETERIZATION      CHOLECYSTECTOMY      COLONOSCOPY N/A 3/10/2019    COLONOSCOPY DIAGNOSTIC WITH IDENTIFICATION OF POLYPS performed by Cecilio Jara MD at [place] [id number] COLONOSCOPY N/A 4/17/2019    COLONOSCOPY DIAGNOSTIC PEDI performed by Fernando Lopez MD at [address] [place] [address]  11/05/2018    UPPER GASTROINTESTINAL ENDOSCOPY N/A 3/9/2019    EGD DIAGNOSTIC ONLY performed by Kerry Nelson MD at [facility] ENDOSCOPY       Family History   Problem Relation Age of Onset    High Blood Pressure Mother     Heart Disease Mother         enlarged heart       Social History     Tobacco Use    Smoking status: Former Smoker     Packs/day: 1.00     Years: 65.00     Pack years: 65.00     Types: Cigarettes     Last attempt to quit: 2018     Years since quittin.7    Smokeless tobacco: Never Used   Substance Use Topics    Alcohol use: No    Drug use: No       Current Outpatient Medications   Medication Sig Dispense Refill    metoprolol tartrate (LOPRESSOR) 50 MG tablet Take 50 mg by mouth 2 times daily      cephALEXin (KEFLEX) 250 MG capsule Take 1 capsule by mouth 2 times daily 14 capsule 0    gabapentin (NEURONTIN) 100 MG capsule Take 1 capsule by mouth 2 times daily for 30 days.  60 capsule 0    OXYGEN Inhale 2 L/L into the lungs nightly      pantoprazole (PROTONIX) 40 MG tablet Take 1 tablet by mouth every morning (before breakfast) 30 tablet 3    apixaban (ELIQUIS) 2.5 MG TABS tablet Take 1 tablet by mouth 2 times daily 60 tablet 1    midodrine (PROAMATINE) 5 MG tablet Take 1 tablet by mouth 3 times daily (with meals) 90 tablet 3    torsemide (DEMADEX) 20 MG tablet Take 5 tablets by mouth 2 times daily (Patient taking differently: Take 40 mg by mouth 2 times daily ) 60 tablet 0    metOLazone (ZAROXOLYN) 2.5 MG tablet Take 1 tablet by mouth daily 30 tablet 0    spironolactone (ALDACTONE) 25 MG tablet Take 1 tablet by mouth daily 30 tablet 0    pravastatin (PRAVACHOL) 40 MG tablet Take 1 tablet by mouth daily 30 tablet 0    albuterol sulfate HFA (PROAIR HFA) 108 (90 Base) MCG/ACT inhaler Inhale 2 puffs into the lungs every 6 hours as needed for Wheezing 1 Inhaler 0    Calcium Carb-Cholecalciferol (CALCIUM + D3 PO) Take 1,200 mg by mouth daily      Multiple Vitamins-Minerals (MULTIVITAMIN ADULT PO) Take by mouth daily      clopidogrel (PLAVIX) 75 MG tablet Take 75 mg by mouth daily.  Cilostazol (PLETAL PO) Take 100 mg by mouth 2 times daily       potassium chloride (KLOR-CON M) 20 MEQ extended release tablet Take 1 tablet by mouth 2 times daily for 3 days (Patient not taking: Reported on 8/12/2020) 6 tablet 0     No current facility-administered medications for this visit. OBJECTIVE:    /60   Pulse 123   Temp 98.2 °F (36.8 °C)   Ht 4' 10\" (1.473 m)   Wt 92 lb 9.6 oz (42 kg)   SpO2 95%   BMI 19.35 kg/m²     Physical Exam  Vitals signs reviewed. Constitutional:       General: She is not in acute distress. Appearance: She is well-developed. Eyes:      Conjunctiva/sclera: Conjunctivae normal.   Neck:      Musculoskeletal: Neck supple. Cardiovascular:      Rate and Rhythm: Tachycardia present. Rhythm irregular. Pulmonary:      Effort: Pulmonary effort is normal. No respiratory distress. Breath sounds: Normal breath sounds. Abdominal:      General: Bowel sounds are normal.      Palpations: Abdomen is soft. Tenderness: There is no abdominal tenderness. Musculoskeletal:      Right lower leg: Edema present. Left lower leg: Edema present. Neurological:      Mental Status: She is alert and oriented to person, place, and time. Cranial Nerves: No cranial nerve deficit. Psychiatric:         Mood and Affect: Mood normal.         ASSESSMENT:  1. Urine frequency    2. Peripheral polyneuropathy    3. Atrial fibrillation, unspecified type ([facility]    4. CKD (chronic kidney disease) stage 4, GFR 15-29 ml/min ([facility])    5.  At high risk for falls        PLAN:    Orders Placed This Encounter   Procedures    Culture, Urine       Orders Placed This Encounter   Medications    cephALEXin (KEFLEX) 250 MG capsule     Sig: Take 1 capsule by mouth 2 times daily     Dispense:  14 capsule     Refill:  0   Urine Cx  Pt would like to start antibiotic until culture results  ADR's explained  Continue medications  Pt to follow up with cardiology as she is being medically managed only at this time  Fall precautions  Follow up with cardiology, nephrology and other specialists  Maxime Fernandes or call           Return in about 6 months (around 2/12/2021) for Check up. Electronically Signed by Claudeen Armour, DO        On the basis of positive falls risk screening, assessment and plan is as follows: patient declines any further evaluation/treatment for increased falls risk.

## 2020-08-14 ENCOUNTER — OFFICE VISIT (OUTPATIENT)
Dept: PULMONOLOGY | Age: 85
End: 2020-08-14
Payer: MEDICARE

## 2020-08-14 VITALS
SYSTOLIC BLOOD PRESSURE: 112 MMHG | WEIGHT: 92 LBS | HEIGHT: 58 IN | BODY MASS INDEX: 19.31 KG/M2 | DIASTOLIC BLOOD PRESSURE: 58 MMHG | OXYGEN SATURATION: 96 %

## 2020-08-14 PROBLEM — R06.02 SOBOE (SHORTNESS OF BREATH ON EXERTION): Status: ACTIVE | Noted: 2020-08-14

## 2020-08-14 PROBLEM — I48.91 ATRIAL FIBRILLATION (HCC): Status: ACTIVE | Noted: 2020-08-14

## 2020-08-14 PROBLEM — R94.2 DECREASED DIFFUSION CAPACITY OF LUNG: Status: ACTIVE | Noted: 2020-08-14

## 2020-08-14 LAB — URINE CULTURE, ROUTINE: NORMAL

## 2020-08-14 PROCEDURE — G8420 CALC BMI NORM PARAMETERS: HCPCS | Performed by: INTERNAL MEDICINE

## 2020-08-14 PROCEDURE — 3023F SPIROM DOC REV: CPT | Performed by: INTERNAL MEDICINE

## 2020-08-14 PROCEDURE — 99213 OFFICE O/P EST LOW 20 MIN: CPT | Performed by: INTERNAL MEDICINE

## 2020-08-14 PROCEDURE — 1090F PRES/ABSN URINE INCON ASSESS: CPT | Performed by: INTERNAL MEDICINE

## 2020-08-14 PROCEDURE — G8427 DOCREV CUR MEDS BY ELIG CLIN: HCPCS | Performed by: INTERNAL MEDICINE

## 2020-08-14 PROCEDURE — G8926 SPIRO NO PERF OR DOC: HCPCS | Performed by: INTERNAL MEDICINE

## 2020-08-14 ASSESSMENT — ENCOUNTER SYMPTOMS
ABDOMINAL PAIN: 0
COUGH: 0
EYE ITCHING: 0
CHEST TIGHTNESS: 0
COUGH: 1
SHORTNESS OF BREATH: 1
EYE DISCHARGE: 0
ABDOMINAL PAIN: 0
NAUSEA: 0
BACK PAIN: 0
ABDOMINAL DISTENTION: 0
BACK PAIN: 0
SHORTNESS OF BREATH: 1

## 2020-08-14 NOTE — ASSESSMENT & PLAN NOTE
At this time it appears to be sec to the CHF, Pulmonary HTN, Emphysema and Anemia  Needs CHF optimization  Treat anemia  Albuterol PRN  6 MWT in office

## 2020-08-14 NOTE — ASSESSMENT & PLAN NOTE
It appears sec to emphysema, CHF and Pulmonary HTN  6 MWT in office  CHF optimization  Albuterol PRN

## 2020-08-14 NOTE — PROGRESS NOTES
Apolinar Caldera  1935  Referring Provider: Dr. Susan Munoz:     Chief Complaint   Patient presents with    Results     PFT       HPI  Rudy Suarez is a 82 y.o. female has come back as a follow up. She has a 50 pk yr smoking quit about 1 year ago. She has mod to severe MR and mild RVSP of 40 mmhg. She has occasional cough, phlegm in the morning, SOBOE a little. She has emphysema on the CT chest. She is on 2 L/min of oxygen at night time. She had her flu vaccine and pneumovax. She is on Albuterol PRN. Her PFT done on 07/20/2020 showed that she has moderate isolated decrease in DLCO. Current Outpatient Medications   Medication Sig Dispense Refill    metoprolol tartrate (LOPRESSOR) 50 MG tablet Take 50 mg by mouth 2 times daily      cephALEXin (KEFLEX) 250 MG capsule Take 1 capsule by mouth 2 times daily 14 capsule 0    gabapentin (NEURONTIN) 100 MG capsule Take 1 capsule by mouth 2 times daily for 30 days.  60 capsule 0    OXYGEN Inhale 2 L/L into the lungs nightly      pantoprazole (PROTONIX) 40 MG tablet Take 1 tablet by mouth every morning (before breakfast) 30 tablet 3    apixaban (ELIQUIS) 2.5 MG TABS tablet Take 1 tablet by mouth 2 times daily 60 tablet 1    midodrine (PROAMATINE) 5 MG tablet Take 1 tablet by mouth 3 times daily (with meals) 90 tablet 3    torsemide (DEMADEX) 20 MG tablet Take 5 tablets by mouth 2 times daily (Patient taking differently: Take 40 mg by mouth 2 times daily ) 60 tablet 0    metOLazone (ZAROXOLYN) 2.5 MG tablet Take 1 tablet by mouth daily 30 tablet 0    spironolactone (ALDACTONE) 25 MG tablet Take 1 tablet by mouth daily 30 tablet 0    pravastatin (PRAVACHOL) 40 MG tablet Take 1 tablet by mouth daily 30 tablet 0    albuterol sulfate HFA (PROAIR HFA) 108 (90 Base) MCG/ACT inhaler Inhale 2 puffs into the lungs every 6 hours as needed for Wheezing 1 Inhaler 0    Calcium Carb-Cholecalciferol (CALCIUM + D3 PO) Take 1,200 mg by mouth daily      Multiple Vitamins-Minerals (MULTIVITAMIN ADULT PO) Take by mouth daily      clopidogrel (PLAVIX) 75 MG tablet Take 75 mg by mouth daily.  Cilostazol (PLETAL PO) Take 100 mg by mouth 2 times daily       potassium chloride (KLOR-CON M) 20 MEQ extended release tablet Take 1 tablet by mouth 2 times daily for 3 days (Patient not taking: Reported on 8/12/2020) 6 tablet 0     No current facility-administered medications for this visit.         Allergies   Allergen Reactions    Lipitor [Atorvastatin]     Sulfa Antibiotics Hives    Zocor [Simvastatin]        Past Medical History:   Diagnosis Date    Acute renal failure ([facility] 4/17/2017    Acute respiratory failure ([facility] 11/2018    Anemia     per old chart Hgb 7.5 on 3/8/2019( had transfusion)    Anemia, unspecified     Brain aneurysm     NO MRI--[facility] coiling at [facility] CAD (coronary artery disease)     Carotid stenosis, bilateral 11/2018    Severe    Cerebral artery occlusion with cerebral infarction [facility])     \"after cath, had slight stroke or TIA and affected my vision, had double vision lasted approx 24 hours and no trouble since then\"    CHF (congestive heart failure) ([facility]     \"get pulmonary edema from a bad heart valve and CHF\" follow with Dr Yovana Cates    Chronic diarrhea     Chronic kidney disease     \"follow with Dr Malena Brown" \"creatine level goes up and down\"    COPD (chronic obstructive pulmonary disease) ([facility]     [facility]    \"have hx of this so they have to watch the IV fluid\"    Full dentures     Gastroesophageal reflux disease without esophagitis     Hiatal hernia     History of blood transfusion     3/2019    Hyperlipidemia     Hypertension     Irritable bowel disease     \"better now\"    Left carotid bruit 11/21/2014    Localized edema     Low back pain     \"for yrs- wear a back brace when I do anything for support\"    Nicotine dependence, other tobacco product, uncomplicated     On home oxygen therapy     2l/nc just at night     Osteoporosis     PONV (postoperative nausea and vomiting)     hx of motion sickness    Proteinuria, unspecified     PVD (peripheral vascular disease) ([facility])     Severe mitral regurgitation 2018    Solitary cyst of left breast     Subclavian steal syndrome 2014    Right side    TIA (transient ischemic attack)     Unspecified asthma, uncomplicated     ( pt stated does not have asthma- with phone assess 2019)    Unspecified osteoarthritis, unspecified site     Unspecified urinary incontinence     VHD (valvular heart disease)     Wears glasses     Wears glasses        Past Surgical History:   Procedure Laterality Date    AORTO-FEMORAL BYPASS GRAFT Left     [facility] [place]    BRAIN ANEURYSM SURGERY      [facility]ellar- [facility]    BRAIN SURGERY      CARDIAC CATHETERIZATION      CHOLECYSTECTOMY      COLONOSCOPY N/A 3/10/2019    COLONOSCOPY DIAGNOSTIC WITH IDENTIFICATION OF POLYPS performed by Carl Cortes MD at [place] [id number] COLONOSCOPY N/A 2019    COLONOSCOPY DIAGNOSTIC PEDI performed by Michelle Hoyt MD at [address] [place] [address]  2018    UPPER GASTROINTESTINAL ENDOSCOPY N/A 3/9/2019    EGD DIAGNOSTIC ONLY performed by Carl Cortes MD at [address] [place] [address] History     Socioeconomic History    Marital status:      Spouse name: None    Number of children: 3    Years of education: None    Highest education level: None   Occupational History     Comment: Retired   Social Needs    Financial resource strain: None    Food insecurity     Worry: None     Inability: None    Transportation needs     Medical: None     Non-medical: None   Tobacco Use    Smoking status: Former Smoker     Packs/day: 1.00     Years: 65.00     Pack years: 65.00     Types: Cigarettes     Last attempt to quit: 2018     Years since quittin.7    Smokeless tobacco: Never Used   Substance and Sexual are equal, round, and reactive to light. Neck:      Musculoskeletal: Normal range of motion and neck supple. Cardiovascular:      Rate and Rhythm: Normal rate and regular rhythm. Pulses: Normal pulses. Heart sounds: Normal heart sounds. Pulmonary:      Effort: Pulmonary effort is normal.      Breath sounds: Normal breath sounds. Abdominal:      General: Abdomen is flat. Palpations: Abdomen is soft. Musculoskeletal: Normal range of motion. Skin:     General: Skin is warm and dry. Neurological:      General: No focal deficit present. Mental Status: She is alert and oriented to person, place, and time. Psychiatric:         Mood and Affect: Mood normal.         Behavior: Behavior normal.         Radiology: Reviewed    Assessment and Plan     Problem List        Pulmonary Problems    COPD (chronic obstructive pulmonary disease) ([facility])     Advised to c/w quitting smoking  Albuterol PRN  6 MWT in office  Get yearly flu vaccine'           Relevant Medications    albuterol sulfate HFA (PROAIR HFA) 108 (90 Base) MCG/ACT inhaler    Other Relevant Orders    Full PFT Study With Bronchodilator    Decreased diffusion capacity of lung     At this time it appears to be sec to the CHF, Pulmonary HTN, Emphysema and Anemia  Needs CHF optimization  Treat anemia  Albuterol PRN  6 MWT in office         SOBOE (shortness of breath on exertion)     It appears sec to emphysema, CHF and Pulmonary HTN  6 MWT in office  CHF optimization  Albuterol PRN            Other    Ex-smoker     Advised to c/w quitting smoking                    Return in about 1 year (around 8/14/2021) for 6 MWT, PFT.      Progress notes sent to the referring Provider    Taylor Portillo MD  8/14/2020  10:24 AM

## 2020-08-25 ENCOUNTER — HOSPITAL ENCOUNTER (OUTPATIENT)
Age: 85
Discharge: HOME | End: 2020-08-25
Payer: MEDICARE

## 2020-08-25 LAB
ALBUMIN SERPL-MCNC: 4.7 GM/DL (ref 3.4–5)
ALP BLD-CCNC: 75 IU/L (ref 40–128)
ALT SERPL-CCNC: 13 U/L (ref 10–40)
ANION GAP SERPL CALCULATED.3IONS-SCNC: 23 MMOL/L (ref 4–16)
AST SERPL-CCNC: 19 IU/L (ref 15–37)
BACTERIA: ABNORMAL /HPF
BILIRUB SERPL-MCNC: 0.5 MG/DL (ref 0–1)
BILIRUBIN URINE: NEGATIVE MG/DL
BLOOD, URINE: NEGATIVE
BUN BLDV-MCNC: 152 MG/DL (ref 6–23)
CALCIUM SERPL-MCNC: 8.1 MG/DL (ref 8.3–10.6)
CHLORIDE BLD-SCNC: 87 MMOL/L (ref 99–110)
CLARITY: CLEAR
CO2: 23 MMOL/L (ref 21–32)
COLOR: YELLOW
CREAT SERPL-MCNC: 3.8 MG/DL (ref 0.6–1.1)
GFR AFRICAN AMERICAN: 14 ML/MIN/1.73M2
GFR NON-AFRICAN AMERICAN: 11 ML/MIN/1.73M2
GLUCOSE BLD-MCNC: 127 MG/DL (ref 70–99)
GLUCOSE, URINE: NEGATIVE MG/DL
KETONES, URINE: NEGATIVE MG/DL
LEUKOCYTE ESTERASE, URINE: NEGATIVE
MAGNESIUM: 1.2 MG/DL (ref 1.8–2.4)
MUCUS: ABNORMAL HPF
NITRITE URINE, QUANTITATIVE: NEGATIVE
PH, URINE: 5 (ref 5–8)
PHOSPHORUS: 5.1 MG/DL (ref 2.5–4.9)
POTASSIUM SERPL-SCNC: 2.9 MMOL/L (ref 3.5–5.1)
PROTEIN UA: NEGATIVE MG/DL
RBC URINE: ABNORMAL /HPF (ref 0–6)
SODIUM BLD-SCNC: 133 MMOL/L (ref 135–145)
SPECIFIC GRAVITY UA: 1.01 (ref 1–1.03)
SQUAMOUS EPITHELIAL: 2 /HPF
TOTAL PROTEIN: 6.9 GM/DL (ref 6.4–8.2)
TRICHOMONAS: ABNORMAL /HPF
UROBILINOGEN, URINE: NORMAL MG/DL (ref 0.2–1)
WBC UA: ABNORMAL /HPF (ref 0–5)

## 2020-08-25 PROCEDURE — 80053 COMPREHEN METABOLIC PANEL: CPT

## 2020-08-25 PROCEDURE — 81001 URINALYSIS AUTO W/SCOPE: CPT

## 2020-08-25 PROCEDURE — 36415 COLL VENOUS BLD VENIPUNCTURE: CPT

## 2020-08-25 PROCEDURE — 83735 ASSAY OF MAGNESIUM: CPT

## 2020-08-25 PROCEDURE — 84100 ASSAY OF PHOSPHORUS: CPT

## 2020-09-11 ENCOUNTER — TELEPHONE (OUTPATIENT)
Dept: INTERNAL MEDICINE CLINIC | Age: 85
End: 2020-09-11

## 2024-05-06 NOTE — ED NOTES
Spoke with patient, relayed Dr. Mitchell's detailed message and patient verbalized good understanding.     RX sent to [facility] and lab order placed per written order from Dr. Mitchell.     Warm hand off given to scheduling desk to set up RN/lab only appointment in 2 wks and update sent to Dr. Claudia Mitchell.     Julie Behrendt RN     This nurse called to room by Dr. Gertrude Lujan, patient short of breath and coughing oxygen saturation 79% after patient returned from CT.  patient placed on non rebreather RT rodger called to room patient placed on bipap oxygen improving to 90%. New orders received.       Oliver Pickett, RN  11/05/18 817 [facility], RN  11/05/18 0020

## (undated) DEVICE — FORCEPS BX L240CM JAW DIA2.8MM L CAP W/ NDL MIC MESH TOOTH

## (undated) DEVICE — Z DISCONTINUED NO SUB IDED TUBING ETCO2 AD L6.5FT NSL ORAL CVD PRNG NONFLARED TIP OVR

## (undated) DEVICE — Z DISCONTINUED (USE MFG CAT MVABO)  TUBING GAS SAMPLING STD 6.5 FT FEMALE CONN SMRT CAPNOLINE